# Patient Record
Sex: FEMALE | Race: WHITE | HISPANIC OR LATINO | Employment: UNEMPLOYED | URBAN - METROPOLITAN AREA
[De-identification: names, ages, dates, MRNs, and addresses within clinical notes are randomized per-mention and may not be internally consistent; named-entity substitution may affect disease eponyms.]

---

## 2017-01-09 ENCOUNTER — NON-PROVIDER VISIT (OUTPATIENT)
Dept: OBGYN | Facility: CLINIC | Age: 22
End: 2017-01-09
Payer: MEDICAID

## 2017-01-09 DIAGNOSIS — Z32.01 PREGNANCY EXAMINATION OR TEST, POSITIVE RESULT: ICD-10-CM

## 2017-01-09 LAB
INT CON NEG: NEGATIVE
INT CON POS: POSITIVE
POC URINE PREGNANCY TEST: POSITIVE

## 2017-01-09 PROCEDURE — 81025 URINE PREGNANCY TEST: CPT | Performed by: OBSTETRICS & GYNECOLOGY

## 2017-01-13 ENCOUNTER — APPOINTMENT (OUTPATIENT)
Dept: RADIOLOGY | Facility: MEDICAL CENTER | Age: 22
End: 2017-01-13
Attending: EMERGENCY MEDICINE
Payer: MEDICAID

## 2017-01-13 ENCOUNTER — HOSPITAL ENCOUNTER (EMERGENCY)
Facility: MEDICAL CENTER | Age: 22
End: 2017-01-13
Attending: EMERGENCY MEDICINE
Payer: MEDICAID

## 2017-01-13 VITALS
HEART RATE: 57 BPM | TEMPERATURE: 97.5 F | BODY MASS INDEX: 27.1 KG/M2 | SYSTOLIC BLOOD PRESSURE: 115 MMHG | RESPIRATION RATE: 14 BRPM | HEIGHT: 66 IN | OXYGEN SATURATION: 97 % | WEIGHT: 168.65 LBS | DIASTOLIC BLOOD PRESSURE: 54 MMHG

## 2017-01-13 DIAGNOSIS — O00.109 TUBAL PREGNANCY WITHOUT INTRAUTERINE PREGNANCY: ICD-10-CM

## 2017-01-13 LAB
ALBUMIN SERPL BCP-MCNC: 4.2 G/DL (ref 3.2–4.9)
ALBUMIN/GLOB SERPL: 1.6 G/DL
ALP SERPL-CCNC: 55 U/L (ref 30–99)
ALT SERPL-CCNC: 11 U/L (ref 2–50)
ANION GAP SERPL CALC-SCNC: 10 MMOL/L (ref 0–11.9)
AST SERPL-CCNC: 11 U/L (ref 12–45)
B-HCG SERPL-ACNC: 924.3 MIU/ML (ref 0–5)
BASOPHILS # BLD AUTO: 0.4 % (ref 0–1.8)
BASOPHILS # BLD: 0.04 K/UL (ref 0–0.12)
BILIRUB SERPL-MCNC: 0.4 MG/DL (ref 0.1–1.5)
BUN SERPL-MCNC: 12 MG/DL (ref 8–22)
CALCIUM SERPL-MCNC: 9 MG/DL (ref 8.5–10.5)
CHLORIDE SERPL-SCNC: 108 MMOL/L (ref 96–112)
CO2 SERPL-SCNC: 20 MMOL/L (ref 20–33)
CREAT SERPL-MCNC: 0.54 MG/DL (ref 0.5–1.4)
EOSINOPHIL # BLD AUTO: 0.1 K/UL (ref 0–0.51)
EOSINOPHIL NFR BLD: 1.1 % (ref 0–6.9)
ERYTHROCYTE [DISTWIDTH] IN BLOOD BY AUTOMATED COUNT: 40.8 FL (ref 35.9–50)
GFR SERPL CREATININE-BSD FRML MDRD: >60 ML/MIN/1.73 M 2
GLOBULIN SER CALC-MCNC: 2.6 G/DL (ref 1.9–3.5)
GLUCOSE SERPL-MCNC: 95 MG/DL (ref 65–99)
HCT VFR BLD AUTO: 37.3 % (ref 37–47)
HGB BLD-MCNC: 12.4 G/DL (ref 12–16)
IMM GRANULOCYTES # BLD AUTO: 0.04 K/UL (ref 0–0.11)
IMM GRANULOCYTES NFR BLD AUTO: 0.4 % (ref 0–0.9)
LYMPHOCYTES # BLD AUTO: 1.85 K/UL (ref 1–4.8)
LYMPHOCYTES NFR BLD: 20.6 % (ref 22–41)
MCH RBC QN AUTO: 28.3 PG (ref 27–33)
MCHC RBC AUTO-ENTMCNC: 33.2 G/DL (ref 33.6–35)
MCV RBC AUTO: 85.2 FL (ref 81.4–97.8)
MONOCYTES # BLD AUTO: 0.86 K/UL (ref 0–0.85)
MONOCYTES NFR BLD AUTO: 9.6 % (ref 0–13.4)
NEUTROPHILS # BLD AUTO: 6.1 K/UL (ref 2–7.15)
NEUTROPHILS NFR BLD: 67.9 % (ref 44–72)
NRBC # BLD AUTO: 0 K/UL
NRBC BLD AUTO-RTO: 0 /100 WBC
NUMBER OF RH DOSES IND 8505RD: NORMAL
PLATELET # BLD AUTO: 335 K/UL (ref 164–446)
PMV BLD AUTO: 9 FL (ref 9–12.9)
POTASSIUM SERPL-SCNC: 3.9 MMOL/L (ref 3.6–5.5)
PROT SERPL-MCNC: 6.8 G/DL (ref 6–8.2)
RBC # BLD AUTO: 4.38 M/UL (ref 4.2–5.4)
RH BLD: NORMAL
SODIUM SERPL-SCNC: 138 MMOL/L (ref 135–145)
WBC # BLD AUTO: 9 K/UL (ref 4.8–10.8)

## 2017-01-13 PROCEDURE — 86901 BLOOD TYPING SEROLOGIC RH(D): CPT

## 2017-01-13 PROCEDURE — 96372 THER/PROPH/DIAG INJ SC/IM: CPT

## 2017-01-13 PROCEDURE — 84702 CHORIONIC GONADOTROPIN TEST: CPT

## 2017-01-13 PROCEDURE — 80053 COMPREHEN METABOLIC PANEL: CPT

## 2017-01-13 PROCEDURE — 76817 TRANSVAGINAL US OBSTETRIC: CPT

## 2017-01-13 PROCEDURE — 85025 COMPLETE CBC W/AUTO DIFF WBC: CPT

## 2017-01-13 PROCEDURE — 700111 HCHG RX REV CODE 636 W/ 250 OVERRIDE (IP): Performed by: OBSTETRICS & GYNECOLOGY

## 2017-01-13 PROCEDURE — 99285 EMERGENCY DEPT VISIT HI MDM: CPT

## 2017-01-13 RX ADMIN — METHOTREXATE 47.5 MG: 25 INJECTION, SOLUTION INTRA-ARTERIAL; INTRAMUSCULAR; INTRATHECAL; INTRAVENOUS at 12:03

## 2017-01-13 ASSESSMENT — PAIN SCALES - GENERAL
PAINLEVEL_OUTOF10: 1
PAINLEVEL_OUTOF10: 2

## 2017-01-13 NOTE — ED AVS SNAPSHOT
1/13/2017          [facility]ulo1 [facility] [address]    Dear Karlee:    [facility] wants to ensure your discharge home is safe and you or your loved ones have had all your questions answered regarding your care after you leave the hospital.    You may receive a telephone call within two days of your discharge.  This call is to make certain you understand your discharge instructions as well as ensure we provided you with the best care possible during your stay with us.     The call will only last approximately 3-5 minutes and will be done by a nurse.    Once again, we want to ensure your discharge home is safe and that you have a clear understanding of any next steps in your care.  If you have any questions or concerns, please do not hesitate to contact us, we are here for you.  Thank you for choosing [facility] for your healthcare needs.    Sincerely,    Bipin Paiz    [facility]

## 2017-01-13 NOTE — ED NOTES
Chief Complaint   Patient presents with   • Vaginal Bleeding      patient estimates blood loss at roughly a pad every three hours.   • Abdominal Pain     LLQ.    • Pregnancy     patient is unsure about exact stage of pregnancy but thinks around 4 weeks   Pt ambulatory to triage with above complaint. Pt returned to jet, educated on triage process, and to inform staff of any changes or concerns.   Patient was playing with small children when she felt the pain come suddenly

## 2017-01-13 NOTE — CONSULTS
"Chief Complaint   Patient presents with   • Vaginal Bleeding      patient estimates blood loss at roughly a pad every three hours.   • Abdominal Pain     LLQ.    • Pregnancy     patient is unsure about exact stage of pregnancy but thinks around 4 weeks     REQUEST FOR CONSULT REGARDING PROBABLE ECTOPIC PREGNANCY    History of present illness:   21 y.o.  early pregnancy presents to the ER today because of vaginal bleeding and lower abdominal pain more on the left  (+) pregnancy test early this week done at [facility]. She was calculated to be about 4+ weeks  Pt went to [facility] ER yesterday secondary above complaints - BHCG 1100+. TVS done. Labs reviewed  She came today because of ongoing vaginal bleeding and abdominal pain. Repeat BHCG - 900  H/H remained stable  TVS reviewed, 2 x 1 cm adnexal mass highly suspicious for ectopic pregnancy  Pt has no other gyn symptoms  No SOB, no dizziness  Review of systems:  Pertinent positives documented in HPI and all other systems reviewed & are negative    All PMH, PSH, allergies, social history and FH reviewed and updated today:  History reviewed. No pertinent past medical history.    History reviewed. No pertinent past surgical history.    Allergies: No Known Allergies    Social History     Social History   • Marital Status: Single     Spouse Name: N/A   • Number of Children: N/A   • Years of Education: N/A     Occupational History   • Not on file.     Social History Main Topics   • Smoking status: Never Smoker    • Smokeless tobacco: Not on file   • Alcohol Use: No   • Drug Use: No   • Sexual Activity: Not on file     Other Topics Concern   • Not on file     Social History Narrative   • No narrative on file       Family History   Problem Relation Age of Onset   • Diabetes Maternal Grandmother      Physical exam:  Blood pressure 115/54, pulse 77, temperature 36.4 °C (97.5 °F), resp. rate 18, height 1.676 m (5' 6\"), weight 76.5 kg (168 lb 10.4 oz), last menstrual period " 12/10/2016, SpO2 95 %.    General:appears stated age, is in no apparent distress, is well developed and well nourished  Head: normocephalic, non-tender  Neck: neck is supple  CV : regular rate and rhythm, no peripheral edema  Lungs: Normal respiratory effort. Clear to auscultation bilaterally  Abdomen: Bowel sounds positive, nondistended, soft, tender to deep palpation left lower pelvis, no rebound or guarding.   No organomegaly. No masses.  Female GYN: (+) minimal to moderate blood in the vault  Cervix - closed  Uterus - not enlarged, (+) tender bilateral adnexal areas  Skin: No rashes, or ulcers or lesions seen  Psychiatric: Patient shows appropriate affect, is alert and oriented x3, intact judgment and insight.    ASSESSMENT AND PLAN:  1. 21 year old , Left Ectopic pregnancy  2. Labs and imaging discussed with her. TVS reviewed, 2 x 1 cm adnexal mass highly suspicious for ectopic pregnancy; (+) FF in the cul-de-sac  3. Patient is hemodynamically stable and a candidate for MTX  4. MTX vs surgical procedure via laparoscopy discussed. R/I/B   5. Pt agreed to MTX tx. Success and failure, side effects, risks of the medication explained. Instructions for ff-up. Repeat BHCG Day 4 and Day 7 of MTX ordered  6. All questions addressed to stated satisfaction  7. Pt to ff-up at [facility] 1 week

## 2017-01-13 NOTE — ED NOTES
Discharging patient home. Verbalized understanding of discharge instructions, follow up appointments, and home care. All questions answered.  Belongings with patient at time of discharge.  Vitals signs WNL. Pt given discharge information. Discharge assessment completed.

## 2017-01-13 NOTE — DISCHARGE INSTRUCTIONS
Methotrexate Treatment for an Ectopic Pregnancy  Methotrexate is a medicine that treats ectopic pregnancy by stopping the growth of the fertilized egg. It also helps your body absorb tissue from the egg. This takes between 2 weeks and 6 weeks. Most ectopic pregnancies can be successfully treated with methotrexate if they are detected early enough.  LET YOUR HEALTH CARE PROVIDER KNOW ABOUT:  · Any allergies you have.  · All medicines you are taking, including vitamins, herbs, eye drops, creams, and over-the-counter medicines.  · Medical conditions you have.  RISKS AND COMPLICATIONS  Generally, this is a safe treatment. However, as with any treatment, problems can occur. Possible problems or side effects include:  · Nausea.  · Vomiting.  · Diarrhea.  · Abdominal cramping.  · Mouth sores.  · Increased vaginal bleeding or spotting.    · Swelling or irritation of the lining of your lungs (pneumonitis).   · Failed treatment and continuation of the pregnancy.    · Liver damage.  · Hair loss.  There is still a risk of the ectopic pregnancy rupturing while using the methotrexate.  BEFORE THE PROCEDURE  Before you take the medicine:   · Liver tests, kidney tests, and a complete blood test are performed.  · Blood tests are performed to measure the pregnancy hormone levels and to determine your blood type.  · If you are Rh-negative and the father is Rh-positive or his Rh type is not known, you will be given a Rho (D) immune globulin shot.  PROCEDURE   There are two methods that your health care provider may use to prescribe methotrexate. One method involves a single dose or injection of the medicine. Another method involves a series of doses given through several injections.   AFTER THE PROCEDURE  · You may have some abdominal cramping, vaginal bleeding, and fatigue in the first few days after taking methotrexate.  · Blood tests will be taken for several weeks to check the pregnancy hormone levels. The blood tests are performed  until there is no more pregnancy hormone detected in the blood.     This information is not intended to replace advice given to you by your health care provider. Make sure you discuss any questions you have with your health care provider.     Document Released: 12/12/2002 Document Revised: 01/08/2016 Document Reviewed: 10/06/2014  Elsevier Interactive Patient Education ©2016 Elsevier Inc.

## 2017-01-13 NOTE — PROGRESS NOTES
"Pharmacy Methotrexate for Ectopic Pregnancy Calculation Note       Wt 76.5 kg Ht 66 in  BSA ([place]eller) 1.89 m2    Pertinent Labs:  SCr 0.54  T. Bili 0.4, AST 11, ALT 11    Dx: Ectopic Pregnancy  Usual dose: Methotrexate 50 mg/m2 IM x 1 dose   Reference:   Lynette PAK, \"Clinical Practice. Ectopic Pregnancy,\" N Engl J Med, 2009, 361(4):379-87.      Calculations:  Methotrexate 50 mg/m2 x 1.89 m2 = 94.5 mg  Final Dose = 95 mg IM   Conc = 25 mg/mL  Split into two syringes 1.9 mL each for total volume = 3.8 mL     Harjeet Ge, PharmD, BCPS        "

## 2017-01-13 NOTE — ED AVS SNAPSHOT
After Visit Summary                                                                                                                Karlee Munoz   MRN: [id number]    Department:  [facility], Emergency Dept   Date of Visit:  1/13/2017            [facility], Emergency Dept    [address]    [place] [address]    Phone:  [phone]      You were seen by     Cy Hopkins M.D.      Your Diagnosis Was     Tubal pregnancy without intrauterine pregnancy     O00.10       These are the medications you received during your hospitalization from 01/13/2017 0421 to 01/13/2017 1314     Date/Time Order Dose Route Action    01/13/2017 1203 methotrexate PF 47.5 mg in syringe 1.9 mL Chemotherapy 47.5 mg Intramuscular Given    01/13/2017 1203 methotrexate PF 47.5 mg in syringe 1.9 mL Chemotherapy 47.5 mg Intramuscular Given      Follow-up Information     1. Follow up with Maricel Vicente M.D. In 2 days.    Specialty:  OB/Gyn    Why:  for recheck, return for feeling lightheaded and dizzy heavy bleeding worsening abdominal pain    Contact information    [address]  [facility] [address] [phone]        Medication Information     Review all of your home medications and newly ordered medications with your primary doctor and/or pharmacist as soon as possible. Follow medication instructions as directed by your doctor and/or pharmacist.     Please keep your complete medication list with you and share with your physician. Update the information when medications are discontinued, doses are changed, or new medications (including over-the-counter products) are added; and carry medication information at all times in the event of emergency situations.               Medication List      Notice     You have not been prescribed any medications.            Procedures and tests performed during your visit     BETA-HCG QUANTITATIVE SERUM    CARDIAC MONITORING    CBC WITH  DIFFERENTIAL    COMP METABOLIC PANEL    ESTIMATED GFR    O2 Protocol    RH TYPE FOR RHOGAM FROM E.D.    SALINE LOCK    US-OB PELVIS TRANSVAGINAL        Discharge Instructions       Methotrexate Treatment for an Ectopic Pregnancy  Methotrexate is a medicine that treats ectopic pregnancy by stopping the growth of the fertilized egg. It also helps your body absorb tissue from the egg. This takes between 2 weeks and 6 weeks. Most ectopic pregnancies can be successfully treated with methotrexate if they are detected early enough.  LET YOUR HEALTH CARE PROVIDER KNOW ABOUT:  · Any allergies you have.  · All medicines you are taking, including vitamins, herbs, eye drops, creams, and over-the-counter medicines.  · Medical conditions you have.  RISKS AND COMPLICATIONS  Generally, this is a safe treatment. However, as with any treatment, problems can occur. Possible problems or side effects include:  · Nausea.  · Vomiting.  · Diarrhea.  · Abdominal cramping.  · Mouth sores.  · Increased vaginal bleeding or spotting.    · Swelling or irritation of the lining of your lungs (pneumonitis).   · Failed treatment and continuation of the pregnancy.    · Liver damage.  · Hair loss.  There is still a risk of the ectopic pregnancy rupturing while using the methotrexate.  BEFORE THE PROCEDURE  Before you take the medicine:   · Liver tests, kidney tests, and a complete blood test are performed.  · Blood tests are performed to measure the pregnancy hormone levels and to determine your blood type.  · If you are Rh-negative and the father is Rh-positive or his Rh type is not known, you will be given a Rho (D) immune globulin shot.  PROCEDURE   There are two methods that your health care provider may use to prescribe methotrexate. One method involves a single dose or injection of the medicine. Another method involves a series of doses given through several injections.   AFTER THE PROCEDURE  · You may have some abdominal cramping, vaginal  bleeding, and fatigue in the first few days after taking methotrexate.  · Blood tests will be taken for several weeks to check the pregnancy hormone levels. The blood tests are performed until there is no more pregnancy hormone detected in the blood.     This information is not intended to replace advice given to you by your health care provider. Make sure you discuss any questions you have with your health care provider.     Document Released: 12/12/2002 Document Revised: 01/08/2016 Document Reviewed: 10/06/2014  Elsevier Interactive Patient Education ©2016 [employer]            Patient Information     Patient Information    Following emergency treatment: all patient requiring follow-up care must return either to a private physician or a clinic if your condition worsens before you are able to obtain further medical attention, please return to the emergency room.     Billing Information    At [facility], we work to make the billing process streamlined for our patients.  Our Representatives are here to answer any questions you may have regarding your hospital bill.  If you have insurance coverage and have supplied your insurance information to us, we will submit a claim to your insurer on your behalf.  Should you have any questions regarding your bill, we can be reached online or by phone as follows:  Online: You are able pay your bills online or live chat with our representatives about any billing questions you may have. We are here to help Monday - Friday from 8:00am to 7:30pm and 9:00am - 12:00pm on Saturdays.  Please visit [email or web address]  for more information.   Phone:  [phone] or [phone]    Please note that your emergency physician, surgeon, pathologist, radiologist, anesthesiologist, and other specialists are not employed by [facility] and will therefore bill separately for their services.  Please contact them directly for any questions concerning their bills  at the numbers below:     Emergency Physician Services:  [phone]  [facility]:  [phone]  [facility]:  [phone]  [facility]:  [phone]    1. Your final bill may vary from the amount quoted upon discharge if all procedures are not complete at that time, or if your doctor has additional procedures of which we are not aware. You will receive an additional bill if you return to the Emergency Department at [facility] for suture removal regardless of the facility of which the sutures were placed.     2. Please arrange for settlement of this account at the emergency registration.    3. All self-pay accounts are due in full at the time of treatment.  If you are unable to meet this obligation then payment is expected within 4-5 days.     4. If you have had radiology studies (CT, X-ray, Ultrasound, MRI), you have received a preliminary result during your emergency department visit. Please contact the radiology department [phone] to receive a copy of your final result. Please discuss the Final result with your primary physician or with the follow up physician provided.     Crisis Hotline:  [place] Crisis Hotline:  5-490-BKKZYWS or 1-137.753.9405  [place] Crisis Hotline:    1-710.973.9712 or 728-425-3659         ED Discharge Follow Up Questions    1. In order to provide you with very good care, we would like to follow up with a phone call in the next few days.  May we have your permission to contact you?     YES /  NO    2. What is the best phone number to call you? (       )_____-__________    3. What is the best time to call you?      Morning  /  Afternoon  /  Evening                   Patient Signature:  ____________________________________________________________    Date:  ____________________________________________________________

## 2017-01-13 NOTE — ED NOTES
Pt complaints of low abdominal cramping and bleeding. States she was seen at pregnancy center Monday and told she was 4 w 2 days pregnant.

## 2017-01-13 NOTE — ED AVS SNAPSHOT
Flomio Access Code: [email or web address]  Expires: 2/8/2017 12:40 PM    Your email address is not on file at [facility].  Email Addresses are required for you to sign up for Flomio, please contact [phone] to verify your personal information and to provide your email address prior to attempting to register for Flomio.    Karlee Angulo1 [address] [facility] [place] [address]    Flomio  A secure, online tool to manage your health information     [employer]’s Flomio® is a secure, online tool that connects you to your personalized health information from the privacy of your home -- day or night - making it very easy for you to manage your healthcare. Once the activation process is completed, you can even access your medical information using the Flomio dennis, which is available for free in the Apple Dennis store or Google Play store.     To learn more about Flomio, visit [email or web address] by [email or web address]    There are two levels of access available (as shown below):   My Chart Features  [facility] Primary Care Doctor [facility]  Specialists [facility]  Urgent  Care Non-[facility] Primary Care Doctor   Email your healthcare team securely and privately 24/7 X X X    Manage appointments: schedule your next appointment; view details of past/upcoming appointments X      Request prescription refills. X      View recent personal medical records, including lab and immunizations X X X X   View health record, including health history, allergies, medications X X X X   Read reports about your outpatient visits, procedures, consult and ER notes X X X X   See your discharge summary, which is a recap of your hospital and/or ER visit that includes your diagnosis, lab results, and care plan X X  X     How to register for Flomio:  Once your e-mail address has been verified, follow the following steps to sign up for Flomio.     1. Go to  [email or web address]  2. Click on the Sign Up Now box, which takes you to the New Member Sign Up page. You  will need to provide the following information:  a. Enter your [employer] Access Code exactly as it appears at the top of this page. (You will not need to use this code after you’ve completed the sign-up process. If you do not sign up before the expiration date, you must request a new code.)   b. Enter your date of birth.   c. Enter your home email address.   d. Click Submit, and follow the next screen’s instructions.  3. Create a [employer] ID. This will be your Modulus Video login ID and cannot be changed, so think of one that is secure and easy to remember.  4. Create a [employer] password. You can change your password at any time.  5. Enter your Password Reset Question and Answer. This can be used at a later time if you forget your password.   6. Enter your e-mail address. This allows you to receive e-mail notifications when new information is available in Mod[employer].  7. Click Sign Up. You can now view your health information.    For assistance activating your [employer] account, call [phone]

## 2017-01-13 NOTE — ED PROVIDER NOTES
ED Provider Note    CHIEF COMPLAINT  Chief Complaint   Patient presents with   • Vaginal Bleeding      patient estimates blood loss at roughly a pad every three hours.   • Abdominal Pain     LLQ.    • Pregnancy     patient is unsure about exact stage of pregnancy but thinks around 4 weeks       HPI  Karlee Munoz is a 21 y.o. female who presents for evaluation of lower abdominal pain primarily sharp left-sided mild cramping and vaginal bleeding consistent with a typical menstrual cycle. The patient reports that she is pregnant. Her last special [place] was December 10 which was about 3-5 days ago. She denies heavy bleeding passage of tissue or clots. She reports that she was seen at [facility] and had a positive pregnancy test as well as ultrasound. She has never been pregnant before. No significant medical or surgical history     REVIEW OF SYSTEMS  See HPI for further details. No night sweats weight loss numbness tingling or weakness All other systems are negative.     PAST MEDICAL HISTORY  History reviewed. No pertinent past medical history.  No significant medical history  FAMILY HISTORY  Noncontributory    SOCIAL HISTORY  Social History     Social History   • Marital Status: Single     Spouse Name: N/A   • Number of Children: N/A   • Years of Education: N/A     Social History Main Topics   • Smoking status: Never Smoker    • Smokeless tobacco: None   • Alcohol Use: No   • Drug Use: No   • Sexual Activity: Not Asked     Other Topics Concern   • None     Social History Narrative   • None     No drugs or alcohol  SURGICAL HISTORY  History reviewed. No pertinent past surgical history.    CURRENT MEDICATIONS  Home Medications     Reviewed by Radha Rodrigues R.N. (Registered Nurse) on 01/13/17 at 0543  Med List Status: Complete    Medication Last Dose Status          Patient Sonu Taking any Medications                        ALLERGIES  No Known Allergies    PHYSICAL EXAM  VITAL SIGNS: BP  "115/54 mmHg  Pulse 77  Temp(Src) 36.4 °C (97.5 °F)  Resp 18  Ht 1.676 m (5' 6\")  Wt 76.5 kg (168 lb 10.4 oz)  BMI 27.23 kg/m2  SpO2 95%  LMP 12/10/2016 (Exact Date) Room air O2: 100    Constitutional: Well developed, Well nourished, No acute distress, Non-toxic appearance.   HENT: Normocephalic, Atraumatic, Bilateral external ears normal, Oropharynx moist, No oral exudates, Nose normal.   Eyes: PERRLA, EOMI, Conjunctiva normal, No discharge.   Neck: Normal range of motion, No tenderness, Supple, No stridor.   Cardiovascular: Normal heart rate, Normal rhythm, No murmurs, No rubs, No gallops.   Thorax & Lungs: Normal breath sounds, No respiratory distress, No wheezing, No chest tenderness.   Abdomen: Bowel sounds normal, Soft, mild left lower quadrant tenderness  Skin: Warm, Dry, No erythema, No rash.   Back: No tenderness, No CVA tenderness.   Extremities: Intact distal pulses, No edema, No tenderness, No cyanosis, No clubbing.   Neurologic: Alert & oriented x 3, Normal motor function, Normal sensory function, No focal deficits noted.   Psychiatric: Affect normal, Judgment normal, Mood normal.     RADIOLOGY/PROCEDURES  US-OB PELVIS TRANSVAGINAL   Final Result      1.  Irregular teardrop shaped structure in the image canal is nonspecific, but has the appearance of a pseudogestational sac.      2.  Left adnexal mass remains suspicious for ectopic pregnancy even though appearance is atypical      3.  Small amount of free pelvic fluid.         Comment: Results were discussed with Dr. Hopkins at 8:35 AM            COURSE & MEDICAL DECISION MAKING  Pertinent Labs & Imaging studies reviewed. (See chart for details)  Results for orders placed or performed during the hospital encounter of 01/13/17   CBC WITH DIFFERENTIAL   Result Value Ref Range    WBC 9.0 4.8 - 10.8 K/uL    RBC 4.38 4.20 - 5.40 M/uL    Hemoglobin 12.4 12.0 - 16.0 g/dL    Hematocrit 37.3 37.0 - 47.0 %    MCV 85.2 81.4 - 97.8 fL    MCH 28.3 27.0 - 33.0 " pg    MCHC 33.2 (L) 33.6 - 35.0 g/dL    RDW 40.8 35.9 - 50.0 fL    Platelet Count 335 164 - 446 K/uL    MPV 9.0 9.0 - 12.9 fL    Neutrophils-Polys 67.90 44.00 - 72.00 %    Lymphocytes 20.60 (L) 22.00 - 41.00 %    Monocytes 9.60 0.00 - 13.40 %    Eosinophils 1.10 0.00 - 6.90 %    Basophils 0.40 0.00 - 1.80 %    Immature Granulocytes 0.40 0.00 - 0.90 %    Nucleated RBC 0.00 /100 WBC    Neutrophils (Absolute) 6.10 2.00 - 7.15 K/uL    Lymphs (Absolute) 1.85 1.00 - 4.80 K/uL    Monos (Absolute) 0.86 (H) 0.00 - 0.85 K/uL    Eos (Absolute) 0.10 0.00 - 0.51 K/uL    Baso (Absolute) 0.04 0.00 - 0.12 K/uL    Immature Granulocytes (abs) 0.04 0.00 - 0.11 K/uL    NRBC (Absolute) 0.00 K/uL   COMP METABOLIC PANEL   Result Value Ref Range    Sodium 138 135 - 145 mmol/L    Potassium 3.9 3.6 - 5.5 mmol/L    Chloride 108 96 - 112 mmol/L    Co2 20 20 - 33 mmol/L    Anion Gap 10.0 0.0 - 11.9    Glucose 95 65 - 99 mg/dL    Bun 12 8 - 22 mg/dL    Creatinine 0.54 0.50 - 1.40 mg/dL    Calcium 9.0 8.5 - 10.5 mg/dL    AST(SGOT) 11 (L) 12 - 45 U/L    ALT(SGPT) 11 2 - 50 U/L    Alkaline Phosphatase 55 30 - 99 U/L    Total Bilirubin 0.4 0.1 - 1.5 mg/dL    Albumin 4.2 3.2 - 4.9 g/dL    Total Protein 6.8 6.0 - 8.2 g/dL    Globulin 2.6 1.9 - 3.5 g/dL    A-G Ratio 1.6 g/dL   BETA-HCG QUANTITATIVE SERUM   Result Value Ref Range    Bhcg 924.3 (H) 0.0 - 5.0 mIU/mL   ESTIMATED GFR   Result Value Ref Range    GFR If African American >60 >60 mL/min/1.73 m 2    GFR If Non African American >60 >60 mL/min/1.73 m 2   RH TYPE FOR RHOGAM FROM E.D.   Result Value Ref Range    Emergency Department Rh Typing POS     Number Of Rh Doses Indicated ZERO       We receive the records from [facility]. There she had an indeterminate ultrasound and a quantitative hCG of approximately 1150. Emergent consultation with gynecology was obtained with Dr. Scott. She feels and I agree that this is high likelihood that this is not ectopic given the ultrasound  appearance her presentation small amount of free fluid in the pelvis and [place]. She was aan ideal candidate for methotrexate given the fact that there is no heart tone no evidence of active bleeding and low quantitative hCG. She was given methotrexate intramuscularly here and will be followed up with Dr. Scott   FINAL IMPRESSION  1. Left lower quadrant nonruptured ectopic pregnancy without hemorrhagic shock         Electronically signed by: Cy Hopkins, 1/13/2017 5:53 AM

## 2017-01-16 DIAGNOSIS — O03.9 SAB (SPONTANEOUS ABORTION): ICD-10-CM

## 2017-01-18 ENCOUNTER — GYNECOLOGY VISIT (OUTPATIENT)
Dept: OBGYN | Facility: CLINIC | Age: 22
End: 2017-01-18
Payer: MEDICAID

## 2017-01-18 VITALS
HEART RATE: 66 BPM | DIASTOLIC BLOOD PRESSURE: 58 MMHG | SYSTOLIC BLOOD PRESSURE: 112 MMHG | WEIGHT: 168 LBS | HEIGHT: 67 IN | BODY MASS INDEX: 26.37 KG/M2

## 2017-01-18 DIAGNOSIS — O00.109 TUBAL PREGNANCY WITHOUT INTRAUTERINE PREGNANCY: ICD-10-CM

## 2017-01-18 PROCEDURE — 99203 OFFICE O/P NEW LOW 30 MIN: CPT | Performed by: OBSTETRICS & GYNECOLOGY

## 2017-01-18 NOTE — Clinical Note
January 18, 2017       Patient: Karlee Munoz   YOB: 1995   Date of Visit: 1/18/2017         To Whom It May Concern:    Please excuse Karlee Munoz from work 1/16/2017-1/20/2017.    If you have any questions or concerns, please don't hesitate to call [phone]          Sincerely,          Maricel Vicente M.D.  Electronically Signed

## 2017-01-18 NOTE — NON-PROVIDER
GYN visit for ER follow up  LMP: 12/10/2016  WT: 168 lb  BP: 112/58  Pt states having some spotting, states having lower abdominal pain with cramping.  Preferred pharmacy verified.  Good # [phone]

## 2017-01-18 NOTE — PROGRESS NOTES
"CC= ER ff-up for ectopic pregnancy, s/p MTX (day 6)    History of present illness:   21 y.o.  with diagnosed ectopic pregnancy presents for ff-up  Pt is doing better, no abdominal pain  (+) vaginal spotting    Review of systems:  Pertinent positives documented in HPI and all other systems reviewed & are negative    All PMH, PSH, allergies, social history and FH reviewed and updated today:  Past Medical History   Diagnosis Date   • Anemia        Past Surgical History   Procedure Laterality Date   • Eye surgery  2016     bilateral       Allergies: No Known Allergies    Social History     Social History   • Marital Status: Single     Spouse Name: N/A   • Number of Children: N/A   • Years of Education: N/A     Occupational History   • Not on file.     Social History Main Topics   • Smoking status: Never Smoker    • Smokeless tobacco: Not on file   • Alcohol Use: No   • Drug Use: No   • Sexual Activity:     Partners: Male     Other Topics Concern   • Not on file     Social History Narrative       Family History   Problem Relation Age of Onset   • Diabetes Maternal Grandmother      Physical exam:  Blood pressure 112/58, pulse 66, height 1.695 m (5' 6.73\"), weight 76.204 kg (168 lb), last menstrual period 12/10/2016.    General:appears stated age, is in no apparent distress, is well developed and well nourished  Skin: No rashes, or ulcers or lesions seen  Psychiatric: Patient shows appropriate affect, is alert and oriented x3, intact judgment and insight.    1. Tubal pregnancy without intrauterine pregnancy- s/p MTX (2017)     2. Stable  3. Ectopic precautions  4. Pelvic rest  5. BHCG tomorrow Day 7  6. All questions addressed    "

## 2017-01-18 NOTE — MR AVS SNAPSHOT
"        Karlee Munoz   2017 2:00 PM   Gynecology Visit   MRN: [id number]    [facility]   Dept Phone:  [phone]    Description:  Female : 1995   Provider:  Maricel Vicente M.D.           Allergies as of 2017     No Known Allergies      You were diagnosed with     Tubal pregnancy without intrauterine pregnancy   [633.10.ICD-9-CM]         Vital Signs     Blood Pressure Pulse Height Weight Body Mass Index Last Menstrual Period    112/58 mmHg 66 1.695 m (5' 6.73\") 76.204 kg (168 lb) 26.52 kg/m2 12/10/2016 (Exact Date)    Smoking Status                   Never Smoker            Basic Information     Date Of Birth Sex Race Ethnicity Preferred Language    1995 Female White  Origin (Jamaican,Marshallese,Croatian,Pascual, etc) English      Problem List              ICD-10-CM Priority Class Noted - Resolved    Tubal pregnancy without intrauterine pregnancy- s/p MTX (2017) O00.10   2017 - Present      Health Maintenance        Date Due Completion Dates    IMM HEP B VACCINE (1 of 3 - Primary Series) 1995 ---    IMM HEP A VACCINE (1 of 2 - Standard Series) 4/3/1998 ---    IMM HPV VACCINE (1 of 3 - Female 3 Dose Series) 4/3/2008 ---    IMM VARICELLA (CHICKENPOX) VACCINE (1 of 2 - 2 Dose Adolescent Series) 4/3/2010 ---    IMM MENINGOCOCCAL VACCINE (MCV4) (1 of 1) 4/3/2013 ---    IMM DTaP/Tdap/Td Vaccine (1 - Tdap) 4/3/2016 ---    IMM INFLUENZA (1) 2016 ---            Current Immunizations     No immunizations on file.      Below and/or attached are the medications your provider expects you to take. Review all of your home medications and newly ordered medications with your provider and/or pharmacist. Follow medication instructions as directed by your provider and/or pharmacist. Please keep your medication list with you and share with your provider. Update the information when medications are discontinued, doses are changed, or new medications " (including over-the-counter products) are added; and carry medication information at all times in the event of emergency situations     Allergies:  No Known Allergies          Medications  Valid as of: January 18, 2017 -  4:59 PM    Generic Name Brand Name Tablet Size Instructions for use    .                 Medicines prescribed today were sent to:     [facility] [address] [place] [address], [place] - [address] [id number] [facility] [place] [address]    Phone: [phone] Fax: [phone]    Open 24 Hours?: No      Medication refill instructions:       If your prescription bottle indicates you have medication refills left, it is not necessary to call your provider’s office. Please contact your pharmacy and they will refill your medication.    If your prescription bottle indicates you do not have any refills left, you may request refills at any time through one of the following ways: The online EasyPost system (except Urgent Care), by calling your provider’s office, or by asking your pharmacy to contact your provider’s office with a refill request. Medication refills are processed only during regular business hours and may not be available until the next business day. Your provider may request additional information or to have a follow-up visit with you prior to refilling your medication.   *Please Note: Medication refills are assigned a new Rx number when refilled electronically. Your pharmacy may indicate that no refills were authorized even though a new prescription for the same medication is available at the pharmacy. Please request the medicine by name with the pharmacy before contacting your provider for a refill.           EasyPost Access Code: [email or web address]  Expires: 2/8/2017 12:40 PM    EasyPost  A secure, online tool to manage your health information     [employer]’s EasyPost® is a secure, online tool that connects you to your personalized health information from the privacy of your home -- day or  night - making it very easy for you to manage your healthcare. Once the activation process is completed, you can even access your medical information using the Mass Relevance dennis, which is available for free in the Apple Dennis store or Google Play store.     Mass Relevance provides the following levels of access (as shown below):   My Chart Features   [facility] Primary [facility] Doctor [facility]  Specialists [facility]  Urgent  Care Non-[facility]  Primary Care  Doctor   Email your healthcare team securely and privately 24/7 X X X    Manage appointments: schedule your next appointment; view details of past/upcoming appointments X      Request prescription refills. X      View recent personal medical records, including lab and immunizations X X X X   View health record, including health history, allergies, medications X X X X   Read reports about your outpatient visits, procedures, consult and ER notes X X X X   See your discharge summary, which is a recap of your hospital and/or ER visit that includes your diagnosis, lab results, and care plan. X X       How to register for Mass Relevance:  1. Go to  [email or web address].  2. Click on the Sign Up Now box, which takes you to the New Member Sign Up page. You will need to provide the following information:  a. Enter your Mass Relevance Access Code exactly as it appears at the top of this page. (You will not need to use this code after you’ve completed the sign-up process. If you do not sign up before the expiration date, you must request a new code.)   b. Enter your date of birth.   c. Enter your home email address.   d. Click Submit, and follow the next screen’s instructions.  3. Create a Mass Relevance ID. This will be your Mass Relevance login ID and cannot be changed, so think of one that is secure and easy to remember.  4. Create a Mass Relevance password. You can change your password at any time.  5. Enter your Password Reset Question and Answer. This can be used at a later time if you forget your password.   6. Enter your  e-mail address. This allows you to receive e-mail notifications when new information is available in T-ZONE.  7. Click Sign Up. You can now view your health information.    For assistance activating your T-ZONE account, call [phone]

## 2017-01-23 ENCOUNTER — HOSPITAL ENCOUNTER (EMERGENCY)
Facility: MEDICAL CENTER | Age: 22
End: 2017-01-23
Attending: EMERGENCY MEDICINE
Payer: MEDICAID

## 2017-01-23 ENCOUNTER — TELEPHONE (OUTPATIENT)
Dept: OBGYN | Facility: CLINIC | Age: 22
End: 2017-01-23

## 2017-01-23 ENCOUNTER — GYNECOLOGY VISIT (OUTPATIENT)
Dept: OBGYN | Facility: MEDICAL CENTER | Age: 22
End: 2017-01-23
Payer: MEDICAID

## 2017-01-23 VITALS
RESPIRATION RATE: 16 BRPM | BODY MASS INDEX: 26.79 KG/M2 | HEART RATE: 85 BPM | TEMPERATURE: 98.7 F | HEIGHT: 66 IN | SYSTOLIC BLOOD PRESSURE: 101 MMHG | WEIGHT: 166.67 LBS | DIASTOLIC BLOOD PRESSURE: 61 MMHG | OXYGEN SATURATION: 97 %

## 2017-01-23 VITALS — DIASTOLIC BLOOD PRESSURE: 60 MMHG | SYSTOLIC BLOOD PRESSURE: 110 MMHG

## 2017-01-23 DIAGNOSIS — O00.90 ECTOPIC PREGNANCY, UNSPECIFIED LOCATION, UNSPECIFIED WHETHER INTRAUTERINE PREGNANCY PRESENT: ICD-10-CM

## 2017-01-23 DIAGNOSIS — O00.109 TUBAL PREGNANCY WITHOUT INTRAUTERINE PREGNANCY: ICD-10-CM

## 2017-01-23 DIAGNOSIS — Z79.631 ON METHOTREXATE THERAPY: ICD-10-CM

## 2017-01-23 PROCEDURE — 96372 THER/PROPH/DIAG INJ SC/IM: CPT

## 2017-01-23 PROCEDURE — 99283 EMERGENCY DEPT VISIT LOW MDM: CPT

## 2017-01-23 PROCEDURE — 700111 HCHG RX REV CODE 636 W/ 250 OVERRIDE (IP): Performed by: OBSTETRICS & GYNECOLOGY

## 2017-01-23 RX ADMIN — METHOTREXATE 94 MG: 25 INJECTION, SOLUTION INTRA-ARTERIAL; INTRAMUSCULAR; INTRATHECAL; INTRAVENOUS at 14:33

## 2017-01-23 NOTE — MR AVS SNAPSHOT
Karlee Munoz   2017 12:45 PM   Gynecology Visit   MRN: [id number]    Department:  [facility]   Dept Phone:  [phone]    Description:  Female : 1995   Provider:  Maricel Vicente M.D.           Reason for Visit     Other DUB/Ectopic       Allergies as of 2017     No Known Allergies      You were diagnosed with     Tubal pregnancy without intrauterine pregnancy   [633.10.ICD-9-CM]         Vital Signs     Last Menstrual Period Smoking Status                12/10/2016 (Exact Date) Never Smoker           Basic Information     Date Of Birth Sex Race Ethnicity Preferred Language    1995 Female White  Origin (Citizen of [place],Grenadian,Guinean,Liberian, etc) English      Problem List              ICD-10-CM Priority Class Noted - Resolved    Tubal pregnancy without intrauterine pregnancy- s/p MTX (2017) O00.10   2017 - Present      Health Maintenance        Date Due Completion Dates    IMM HEP B VACCINE (1 of 3 - Primary Series) 1995 ---    IMM HEP A VACCINE (1 of 2 - Standard Series) 4/3/1998 ---    IMM HPV VACCINE (1 of 3 - Female 3 Dose Series) 4/3/2008 ---    IMM VARICELLA (CHICKENPOX) VACCINE (1 of 2 - 2 Dose Adolescent Series) 4/3/2010 ---    IMM MENINGOCOCCAL VACCINE (MCV4) (1 of 1) 4/3/2013 ---    IMM DTaP/Tdap/Td Vaccine (1 - Tdap) 4/3/2016 ---    IMM INFLUENZA (1) 2016 ---            Current Immunizations     No immunizations on file.      Below and/or attached are the medications your provider expects you to take. Review all of your home medications and newly ordered medications with your provider and/or pharmacist. Follow medication instructions as directed by your provider and/or pharmacist. Please keep your medication list with you and share with your provider. Update the information when medications are discontinued, doses are changed, or new medications (including over-the-counter products) are added; and carry medication information at  all times in the event of emergency situations     Allergies:  No Known Allergies          Medications  Valid as of: January 23, 2017 -  2:09 PM    Generic Name Brand Name Tablet Size Instructions for use    .                 Medicines prescribed today were sent to:     [facility] [address] [place] [address] [address] [facility]    [address] [facility] [address]    Phone: [phone] Fax: [phone]    Open 24 Hours?: No      Medication refill instructions:       If your prescription bottle indicates you have medication refills left, it is not necessary to call your provider’s office. Please contact your pharmacy and they will refill your medication.    If your prescription bottle indicates you do not have any refills left, you may request refills at any time through one of the following ways: The online TiGenix system (except Urgent Care), by calling your provider’s office, or by asking your pharmacy to contact your provider’s office with a refill request. Medication refills are processed only during regular business hours and may not be available until the next business day. Your provider may request additional information or to have a follow-up visit with you prior to refilling your medication.   *Please Note: Medication refills are assigned a new Rx number when refilled electronically. Your pharmacy may indicate that no refills were authorized even though a new prescription for the same medication is available at the pharmacy. Please request the medicine by name with the pharmacy before contacting your provider for a refill.        Your To Do List     Future Labs/Procedures Complete By Expires    HCG QUANTITATIVE  As directed 1/23/2018    Scheduling Instructions:    Draw blood Jan 26 2017 - [facility]         TiGenix Access Code: [email or web address]  Expires: 2/8/2017 12:40 PM    TiGenix  A secure, online tool to manage your health information     [employer]’s TiGenix® is a secure, online tool that  connects you to your personalized health information from the privacy of your home -- day or night - making it very easy for you to manage your healthcare. Once the activation process is completed, you can even access your medical information using the Combat Stroke dennis, which is available for free in the Apple Dennis store or Google Play store.     Combat Stroke provides the following levels of access (as shown below):   My Chart Features   [facility] Primary [facility] Doctor [facility]  Specialists [facility]  Urgent  Care Non-[facility]  Primary Care  Doctor   Email your healthcare team securely and privately 24/7 X X X    Manage appointments: schedule your next appointment; view details of past/upcoming appointments X      Request prescription refills. X      View recent personal medical records, including lab and immunizations X X X X   View health record, including health history, allergies, medications X X X X   Read reports about your outpatient visits, procedures, consult and ER notes X X X X   See your discharge summary, which is a recap of your hospital and/or ER visit that includes your diagnosis, lab results, and care plan. X X       How to register for Combat Stroke:  1. Go to  [email or web address].  2. Click on the Sign Up Now box, which takes you to the New Member Sign Up page. You will need to provide the following information:  a. Enter your Combat Stroke Access Code exactly as it appears at the top of this page. (You will not need to use this code after you’ve completed the sign-up process. If you do not sign up before the expiration date, you must request a new code.)   b. Enter your date of birth.   c. Enter your home email address.   d. Click Submit, and follow the next screen’s instructions.  3. Create a Combat Stroke ID. This will be your Combat Stroke login ID and cannot be changed, so think of one that is secure and easy to remember.  4. Create a Combat Stroke password. You can change your password at any time.  5. Enter your Password Reset Question  and Answer. This can be used at a later time if you forget your password.   6. Enter your e-mail address. This allows you to receive e-mail notifications when new information is available in Intelligroup.  7. Click Sign Up. You can now view your health information.    For assistance activating your Intelligroup account, call [phone]

## 2017-01-23 NOTE — PROGRESS NOTES
Chief Complaint   Patient presents with   • Other     DUB/Ectopic      History of present illness:   21 y.o.  with left unruptured EP s/p MTX  (2017) presents for ff-up  No abdominal pain, (+) vaginal bleeding - minimal  BHCG increase slightly from Day 4 to Day 7 (1082 ---> 1577)    Review of systems:  Pertinent positives documented in HPI and all other systems reviewed & are negative    All PMH, PSH, allergies, social history and FH reviewed and updated today:  Past Medical History   Diagnosis Date   • Anemia        Past Surgical History   Procedure Laterality Date   • Eye surgery  2016     bilateral       Allergies: No Known Allergies    Social History     Social History   • Marital Status: Single     Spouse Name: N/A   • Number of Children: N/A   • Years of Education: N/A     Occupational History   • Not on file.     Social History Main Topics   • Smoking status: Never Smoker    • Smokeless tobacco: Not on file   • Alcohol Use: No   • Drug Use: No   • Sexual Activity:     Partners: Male     Other Topics Concern   • Not on file     Social History Narrative       Family History   Problem Relation Age of Onset   • Diabetes Maternal Grandmother      Physical exam:  Last menstrual period 12/10/2016.    General:appears stated age, is in no apparent distress, is well developed and well nourished  Head: normocephalic, non-tender  Abdomen: Bowel sounds positive, nondistended, soft, nontender x4, no rebound or guarding. No organomegaly. No masses.  Skin: No rashes, or ulcers or lesions seen  Psychiatric: Patient shows appropriate affect, is alert and oriented x3, intact judgment and insight.    1. Tubal pregnancy without intrauterine pregnancy- MTX tx     2. TVS bedside : empty uterus with thin ES, (+) free FF in the CDS - seemed decreased. Heterogenous mass previously seen 2 x 1 cm - not visible. Bilateral ovaries seen.   3. Pt will be sent to ER for 2nd MTX. Precautions discussed with her  4. All  questions addressed. Options for Tx discussed - MTX vs surgical via laparoscopy  5. Ff-up BHCG.   6. Pelvic rest  7. Pain and bleeding precautions

## 2017-01-23 NOTE — DISCHARGE INSTRUCTIONS
Tratamiento con metotrexato para el embarazo ectópico  (Methotrexate Treatment for an Ectopic Pregnancy)  El metotrexato es un medicamento que se usa para interrumpir el crecimiento de un óvulo fecundado y así detener un embarazo ectópico. También ayuda a que el cuerpo absorba el tejido del óvulo, lo que demora de 2 a 6 semanas. La mayoría de los embarazos ectópicos pueden tratarse satisfactoriamente con metotrexato si se detectan en helen etapa inicial.  INFORME AL MÉDICO:  · Cualquier alergia que tenga.  · Todos los medicamentos que utiliza, incluidos vitaminas, hierbas, gotas oftálmicas, cremas y medicamentos de venta mitch.  · Enfermedades que tenga.  RIESGOS Y COMPLICACIONES  En general, es un tratamiento seguro. Sin embargo, janeth en cualquier tratamiento, pueden surgir problemas. Entre los problemas y efectos secundarios posibles, se incluyen:  · Náuseas.  · Vómitos.  · Diarrea.  · Cólicos abdominales.  · Llagas en la boca.  · Aumento de la hemorragia vaginal.  · Hinchazón o irritación del revestimiento pulmonar (neumonitis).  · Fracaso del tratamiento y continuación del embarazo.  · Daño hepático.  · Pérdida del lydia.  También existe riesgo de ruptura del embarazo ectópico cuando se utiliza el metotrexato.  ANTES DEL PROCEDIMIENTO  Antes de la aplicación del medicamento:   · Le realizarán pruebas hepáticas, renales y análisis de trever completo.  · Los análisis de trever se hacen para medir los niveles de hormonas del embarazo y determinar carrillo tipo sanguíneo.  · Si usted es Rh negativo y el padre es Rh positivo, o no sabe carrillo Rh, usted recibirá helen inyección de inmunoglobulina Rho(D).  PROCEDIMIENTO   El médico puede recetarle helen de las dos formas de administración del metotrexato. Helen forma consiste en helen sly dosis o inyección del medicamento. La otra forma consiste en helen serie de dosis administradas mediante diversas inyecciones.   DESPUÉS DEL PROCEDIMIENTO  · Puede tener algunos cólicos abdominales,  hemorragia vaginal y fatiga tg los primeros días después de recibir el metotrexato.  · Tg algunas semanas le harán análisis de trever para controlar los niveles de hormonas del embarazo. Se realizarán análisis de trever hasta que no se detecten más hormonas del embarazo en la trever.     Esta información no tiene janeth fin reemplazar el consejo del médico. Asegúrese de hacerle al médico cualquier pregunta que tenga.     Document Released: 09/27/2006 Document Revised: 12/23/2014  [employer] Interactive Patient Education ©2016 Elsevier Inc.  Methotrexate Treatment for an Ectopic Pregnancy  Methotrexate is a medicine that treats ectopic pregnancy by stopping the growth of the fertilized egg. It also helps your body absorb tissue from the egg. This takes between 2 weeks and 6 weeks. Most ectopic pregnancies can be successfully treated with methotrexate if they are detected early enough.  LET YOUR HEALTH CARE PROVIDER KNOW ABOUT:  · Any allergies you have.  · All medicines you are taking, including vitamins, herbs, eye drops, creams, and over-the-counter medicines.  · Medical conditions you have.  RISKS AND COMPLICATIONS  Generally, this is a safe treatment. However, as with any treatment, problems can occur. Possible problems or side effects include:  · Nausea.  · Vomiting.  · Diarrhea.  · Abdominal cramping.  · Mouth sores.  · Increased vaginal bleeding or spotting.    · Swelling or irritation of the lining of your lungs (pneumonitis).   · Failed treatment and continuation of the pregnancy.    · Liver damage.  · Hair loss.  There is still a risk of the ectopic pregnancy rupturing while using the methotrexate.  BEFORE THE PROCEDURE  Before you take the medicine:   · Liver tests, kidney tests, and a complete blood test are performed.  · Blood tests are performed to measure the pregnancy hormone levels and to determine your blood type.  · If you are Rh-negative and the father is Rh-positive or his Rh type is not  known, you will be given a Rho (D) immune globulin shot.  PROCEDURE   There are two methods that your health care provider may use to prescribe methotrexate. One method involves a single dose or injection of the medicine. Another method involves a series of doses given through several injections.   AFTER THE PROCEDURE  · You may have some abdominal cramping, vaginal bleeding, and fatigue in the first few days after taking methotrexate.  · Blood tests will be taken for several weeks to check the pregnancy hormone levels. The blood tests are performed until there is no more pregnancy hormone detected in the blood.     This information is not intended to replace advice given to you by your health care provider. Make sure you discuss any questions you have with your health care provider.     Document Released: 12/12/2002 Document Revised: 01/08/2016 Document Reviewed: 10/06/2014  Elsevier Interactive Patient Education ©2016 Elsevier Inc.

## 2017-01-23 NOTE — ED NOTES
Pt sent by OB/GYN for methotrexate injection for ectopic pregnancy. Pt's AAOx4. Pt denies any pain.

## 2017-01-23 NOTE — ED PROVIDER NOTES
"ED Provider Note    CHIEF COMPLAINT   Chief Complaint   Patient presents with   • Pregnancy     pt states she needs methotrexate injection for ectopic pregnancy.       HPI   Karlee Munoz is a 21 y.o. female who presents at the direction of her gynecologist Dr. Scott .  Patient has known ectopic pregnancy with one previous dose of methotrexate, secondary to rising beta hCG, a 2nd dose was ordered for Mr. VELEZ she is in the emergency department by her gynecologist.  Patient herself at this time denies discomfort.  She had previous vaginal bleeding which is now nearly resolved.  She denies vomiting or dizziness.  Patient states this was her 1st pregnancy.    REVIEW OF SYSTEMS  Genitourinary currently no vaginal bleeding  Constitutional: No dizziness or fever  Gastrointestinal: No abdominal pain  Musculoskeletal no back pain             PAST MEDICAL HISTORY  Past Medical History   Diagnosis Date   • Anemia 2015       FAMILY HISTORY  Family History   Problem Relation Age of Onset   • Diabetes Maternal Grandmother        SOCIAL HISTORY  Social History     Social History   • Marital Status: Single     Spouse Name: N/A   • Number of Children: N/A   • Years of Education: N/A     Social History Main Topics   • Smoking status: Never Smoker    • Smokeless tobacco: None   • Alcohol Use: No   • Drug Use: No   • Sexual Activity:     Partners: Male     Other Topics Concern   • None     Social History Narrative       SURGICAL HISTORY  Past Surgical History   Procedure Laterality Date   • Eye surgery  06/2016     bilateral       CURRENT MEDICATIONS  No current facility-administered medications on file prior to encounter.     No current outpatient prescriptions on file prior to encounter.       ALLERGIES  No Known Allergies    PHYSICAL EXAM  VITAL SIGNS: /61 mmHg  Pulse 85  Temp(Src) 37.1 °C (98.7 °F)  Resp 16  Ht 1.676 m (5' 5.98\")  Wt 75.6 kg (166 lb 10.7 oz)  BMI 26.91 kg/m2  SpO2 97%  LMP 12/10/2016 (Exact " Date)  Constitutional:  Well-nourished  HENT:  Atraumatic, Bilateral external ears normal, Oropharynx moist  Eyes:  Conjunctiva normal, No discharge.   Cardiovascular: Normal heart rate, Normal rhythm, No murmurs, No rubs, No gallops.   Pulmonary: Normal breath sounds, No respiratory distress  Abdomen: Soft and nontender, no guarding  Genitourinary: Deferred  Skin: Warm, Dry, No erythema, No rash.   Musculoskeletal: No tenderness, No CVA tenderness.   Neurologic: Alert & oriented, No focal deficits noted.         COURSE & MEDICAL DECISION MAKING  Pertinent Labs & Imaging studies reviewed. (See chart for details)  Patient received intramuscular methotrexate at the order of her gynecologist.  She was observed for approximately one hour had no adverse effects, this 1 hour observation time was advised by her treating gynecologist.  Patient is discharged with follow-up to her gynecologist next week, she is advised to go to [facility] if worse or for any concerns.    FINAL IMPRESSION  1. Ectopic pregnancy, unspecified location, unspecified whether intrauterine pregnancy present    2. On methotrexate therapy            Electronically signed by: Pb Menjivar, 1/23/2017 6:22 PM

## 2017-01-23 NOTE — TELEPHONE ENCOUNTER
Per Dr. Mathieu Connelly would like to see patient at the private office in [facility]. I called patient and notified her she is scheduled at 1:00 pm with Dr. Mathieu Connelly. Pt voiced understanding and will comply. Pt had no other questions or concerns. Sandra Vivar R.N. Spoke with Karen HARDING and appointment was confirmed for 1:00 p.m.

## 2017-01-23 NOTE — TELEPHONE ENCOUNTER
Dr. Hammonds received patient's HCG results and asked to call patient and have her go to [facility] asap as she needs another MTX shot, per Dr. Mathieu Connelly. I notified patient of Doctor's recommendations and pt voiced understanding. Pt will comply and go into ER right now.

## 2017-01-23 NOTE — ED AVS SNAPSHOT
1/23/2017          Karlee Munoz  [address] [place] [address]    Dear Karlee:    Critical access hospital wants to ensure your discharge home is safe and you or your loved ones have had all your questions answered regarding your care after you leave the hospital.    You may receive a telephone call within two days of your discharge.  This call is to make certain you understand your discharge instructions as well as ensure we provided you with the best care possible during your stay with us.     The call will only last approximately 3-5 minutes and will be done by a nurse.    Once again, we want to ensure your discharge home is safe and that you have a clear understanding of any next steps in your care.  If you have any questions or concerns, please do not hesitate to contact us, we are here for you.  Thank you for choosing [facility] for your healthcare needs.    Sincerely,    Bipin Paiz    [facility]

## 2017-01-23 NOTE — ED AVS SNAPSHOT
After Visit Summary                                                                                                                Karlee Munoz   MRN: [id number]    Department:  [facility], Emergency Dept   Date of Visit:  1/23/2017            [facility], Emergency Dept    [address]    [place] [address]    Phone:  [phone]      You were seen by     Pb Menjivar M.D.      Your Diagnosis Was     Ectopic pregnancy, unspecified location, unspecified whether intrauterine pregnancy present     O00.90       These are the medications you received during your hospitalization from 01/23/2017 1324 to 01/23/2017 1544     Date/Time Order Dose Route Action    01/23/2017 1433 methotrexate PF 94 mg in syringe 3.76 mL Chemotherapy 94 mg Intramuscular Given      Follow-up Information     1. Schedule an appointment as soon as possible for a visit with Maricel Vicente M.D..    Specialty:  OB/Gyn    Why:  see your doctor next week.  If worse go to [facility]    Contact information    [address]  [place] [address] [phone]        Medication Information     Review all of your home medications and newly ordered medications with your primary doctor and/or pharmacist as soon as possible. Follow medication instructions as directed by your doctor and/or pharmacist.     Please keep your complete medication list with you and share with your physician. Update the information when medications are discontinued, doses are changed, or new medications (including over-the-counter products) are added; and carry medication information at all times in the event of emergency situations.               Medication List      Notice     You have not been prescribed any medications.              Discharge Instructions       Tratamiento con metotrexato para el embarazo ectópico  (Methotrexate Treatment for an Ectopic Pregnancy)  El  metotrexato es un medicamento que se usa para interrumpir el crecimiento de un óvulo fecundado y así detener un embarazo ectópico. También ayuda a que el cuerpo absorba el tejido del óvulo, lo que demora de 2 a 6 semanas. La mayoría de los embarazos ectópicos pueden tratarse satisfactoriamente con metotrexato si se detectan en helen etapa inicial.  INFORME AL MÉDICO:  · Cualquier alergia que tenga.  · Todos los medicamentos que utiliza, incluidos vitaminas, hierbas, gotas oftálmicas, cremas y medicamentos de venta mitch.  · Enfermedades que tenga.  RIESGOS Y COMPLICACIONES  En general, es un tratamiento seguro. Sin embargo, janeth en cualquier tratamiento, pueden surgir problemas. Entre los problemas y efectos secundarios posibles, se incluyen:  · Náuseas.  · Vómitos.  · Diarrea.  · Cólicos abdominales.  · Llagas en la boca.  · Aumento de la hemorragia vaginal.  · Hinchazón o irritación del revestimiento pulmonar (neumonitis).  · Fracaso del tratamiento y continuación del embarazo.  · Daño hepático.  · Pérdida del lydia.  También existe riesgo de ruptura del embarazo ectópico cuando se utiliza el metotrexato.  ANTES DEL PROCEDIMIENTO  Antes de la aplicación del medicamento:   · Le realizarán pruebas hepáticas, renales y análisis de trever completo.  · Los análisis de trever se hacen para medir los niveles de hormonas del embarazo y determinar carrillo tipo sanguíneo.  · Si usted es Rh negativo y el padre es Rh positivo, o no sabe carrillo Rh, usted recibirá helen inyección de inmunoglobulina Rho(D).  PROCEDIMIENTO   El médico puede recetarle helen de las dos formas de administración del metotrexato. Helen forma consiste en helen sly dosis o inyección del medicamento. La otra forma consiste en helen serie de dosis administradas mediante diversas inyecciones.   DESPUÉS DEL PROCEDIMIENTO  · Puede tener algunos cólicos abdominales, hemorragia vaginal y fatiga tg los primeros días después de recibir el metotrexato.  · Tg algunas  semanas le harán análisis de trever para controlar los niveles de hormonas del embarazo. Se realizarán análisis de trever hasta que no se detecten más hormonas del embarazo en la trever.     Esta información no tiene janeth fin reemplazar el consejo del médico. Asegúrese de hacerle al médico cualquier pregunta que tenga.     Document Released: 09/27/2006 Document Revised: 12/23/2014  [employer] Interactive Patient Education ©2016 Elsevier Inc.  Methotrexate Treatment for an Ectopic Pregnancy  Methotrexate is a medicine that treats ectopic pregnancy by stopping the growth of the fertilized egg. It also helps your body absorb tissue from the egg. This takes between 2 weeks and 6 weeks. Most ectopic pregnancies can be successfully treated with methotrexate if they are detected early enough.  LET YOUR HEALTH CARE PROVIDER KNOW ABOUT:  · Any allergies you have.  · All medicines you are taking, including vitamins, herbs, eye drops, creams, and over-the-counter medicines.  · Medical conditions you have.  RISKS AND COMPLICATIONS  Generally, this is a safe treatment. However, as with any treatment, problems can occur. Possible problems or side effects include:  · Nausea.  · Vomiting.  · Diarrhea.  · Abdominal cramping.  · Mouth sores.  · Increased vaginal bleeding or spotting.    · Swelling or irritation of the lining of your lungs (pneumonitis).   · Failed treatment and continuation of the pregnancy.    · Liver damage.  · Hair loss.  There is still a risk of the ectopic pregnancy rupturing while using the methotrexate.  BEFORE THE PROCEDURE  Before you take the medicine:   · Liver tests, kidney tests, and a complete blood test are performed.  · Blood tests are performed to measure the pregnancy hormone levels and to determine your blood type.  · If you are Rh-negative and the father is Rh-positive or his Rh type is not known, you will be given a Rho (D) immune globulin shot.  PROCEDURE   There are two methods that your health  care provider may use to prescribe methotrexate. One method involves a single dose or injection of the medicine. Another method involves a series of doses given through several injections.   AFTER THE PROCEDURE  · You may have some abdominal cramping, vaginal bleeding, and fatigue in the first few days after taking methotrexate.  · Blood tests will be taken for several weeks to check the pregnancy hormone levels. The blood tests are performed until there is no more pregnancy hormone detected in the blood.     This information is not intended to replace advice given to you by your health care provider. Make sure you discuss any questions you have with your health care provider.     Document Released: 12/12/2002 Document Revised: 01/08/2016 Document Reviewed: 10/06/2014  [employer] Interactive Patient Education ©2016 [employer].            Patient Information     Patient Information    Following emergency treatment: all patient requiring follow-up care must return either to a private physician or a clinic if your condition worsens before you are able to obtain further medical attention, please return to the emergency room.     Billing Information    At [facility], we work to make the billing process streamlined for our patients.  Our Representatives are here to answer any questions you may have regarding your hospital bill.  If you have insurance coverage and have supplied your insurance information to us, we will submit a claim to your insurer on your behalf.  Should you have any questions regarding your bill, we can be reached online or by phone as follows:  Online: You are able pay your bills online or live chat with our representatives about any billing questions you may have. We are here to help Monday - Friday from 8:00am to 7:30pm and 9:00am - 12:00pm on Saturdays.  Please visit [email or web address]  for more information.   Phone:  [phone] or [phone]    Please  note that your emergency physician, surgeon, pathologist, radiologist, anesthesiologist, and other specialists are not employed by [facility] and will therefore bill separately for their services.  Please contact them directly for any questions concerning their bills at the numbers below:     Emergency Physician Services:  [phone]  [facility]:  [phone]  [facility]:  [phone]  [facility]:  [phone]    1. Your final bill may vary from the amount quoted upon discharge if all procedures are not complete at that time, or if your doctor has additional procedures of which we are not aware. You will receive an additional bill if you return to the Emergency Department at [facility] for suture removal regardless of the facility of which the sutures were placed.     2. Please arrange for settlement of this account at the emergency registration.    3. All self-pay accounts are due in full at the time of treatment.  If you are unable to meet this obligation then payment is expected within 4-5 days.     4. If you have had radiology studies (CT, X-ray, Ultrasound, MRI), you have received a preliminary result during your emergency department visit. Please contact the radiology department [phone] to receive a copy of your final result. Please discuss the Final result with your primary physician or with the follow up physician provided.     Crisis Hotline:  [place] Crisis Hotline:  5-917-DRHMYZL or 1-473.915.1103  [place] Crisis Hotline:    1-523.608.4639 or 144-744-8511         ED Discharge Follow Up Questions    1. In order to provide you with very good care, we would like to follow up with a phone call in the next few days.  May we have your permission to contact you?     YES /  NO    2. What is the best phone number to call you? (       )_____-__________    3. What is the best time to call you?      Morning  /  Afternoon  /  Evening                    Patient Signature:  ____________________________________________________________    Date:  ____________________________________________________________

## 2017-01-23 NOTE — PROGRESS NOTES
"Pharmacy Methotrexate for Ectopic Pregnancy Calculation Note       Wt 75.6 kg Ht 66 in  BSA ([place]) 1.88 m2    Pertinent Labs:  SCr 0.54  T. Bili 0.4, AST 11, ALT 11    Dx: Ectopic Pregnancy  Usual dose: Methotrexate 50 mg/m2 IM x 1 dose   Reference:   Lynette PAK, \"Clinical Practice. Ectopic Pregnancy,\" N Engl J Med, 2009, 361(4):379-87.      Calculations:  Methotrexate 50 mg/m2 x 1.88 m2 = 94 mg  Final Dose = 94 mg IM   Final Volume in syringe using Methotrexate 25 mg/mL injection = 3.76 mL (Syringe 1 2 mL, Syringe 2 1.76)   (Split volumes > 3 ml into two syringes)    Anshu King PharmD BCPS      "

## 2017-01-23 NOTE — ED AVS SNAPSHOT
[employer] Access Code: [email or web address]  Expires: 2/8/2017 12:40 PM    Your email address is not on file at [facility].  Email Addresses are required for you to sign up for [employer], please contact [phone] to verify your personal information and to provide your email address prior to attempting to register for [employer].    Karlee Munoz  [address] [place] [address]    [employer]  A secure, online tool to manage your health information     [facility]’s [employer]® is a secure, online tool that connects you to your personalized health information from the privacy of your home -- day or night - making it very easy for you to manage your healthcare. Once the activation process is completed, you can even access your medical information using the [employer] dennis, which is available for free in the Apple Dennis store or Google Play store.     To learn more about [employer], visit [email or web address]    There are two levels of access available (as shown below):   My Chart Features  [facility] Primary Care Doctor [facility]  Specialists [facility]  Urgent  Care Non-[facility] Primary Care Doctor   Email your healthcare team securely and privately 24/7 X X X    Manage appointments: schedule your next appointment; view details of past/upcoming appointments X      Request prescription refills. X      View recent personal medical records, including lab and immunizations X X X X   View health record, including health history, allergies, medications X X X X   Read reports about your outpatient visits, procedures, consult and ER notes X X X X   See your discharge summary, which is a recap of your hospital and/or ER visit that includes your diagnosis, lab results, and care plan X X  X     How to register for [employer]:  Once your e-mail address has been verified, follow the following steps to sign up for [employer].     1. Go to  [email or web address]  2. Click on the Sign Up Now box, which takes you to the New Member Sign Up page. You  will need to provide the following information:  a. Enter your [employer] Access Code exactly as it appears at the top of this page. (You will not need to use this code after you’ve completed the sign-up process. If you do not sign up before the expiration date, you must request a new code.)   b. Enter your date of birth.   c. Enter your home email address.   d. Click Submit, and follow the next screen’s instructions.  3. Create a My[employer] ID. This will be your [employer] login ID and cannot be changed, so think of one that is secure and easy to remember.  4. Create a [employer] password. You can change your password at any time.  5. Enter your Password Reset Question and Answer. This can be used at a later time if you forget your password.   6. Enter your e-mail address. This allows you to receive e-mail notifications when new information is available in [employer].  7. Click Sign Up. You can now view your health information.    For assistance activating your [employer] account, call [phone]

## 2017-01-25 ENCOUNTER — TELEPHONE (OUTPATIENT)
Dept: OBGYN | Facility: CLINIC | Age: 22
End: 2017-01-25

## 2017-01-25 DIAGNOSIS — O00.109 TUBAL PREGNANCY WITHOUT INTRAUTERINE PREGNANCY: ICD-10-CM

## 2017-01-25 NOTE — TELEPHONE ENCOUNTER
----- Message from Maricel Vicente M.D. sent at 1/25/2017  1:38 PM PST -----  Please call pt to have [facility] blood work done on jan 26 and jan 29 or 30.   Will ff-up please.      Called pt and states she has order for 1/26 blood work but not for 1/29 or 1/30. Advised for pt to come in to [facility] and  the order. Pt agreed.   Per Dr. Mathieu Connelly to bring pt in on 1/31/17 for f/u  1556 called pt back and scheduled her for f/u on 1/31/17 at 0800. Pt agreed.

## 2017-01-31 ENCOUNTER — GYNECOLOGY VISIT (OUTPATIENT)
Dept: OBGYN | Facility: CLINIC | Age: 22
End: 2017-01-31
Payer: MEDICAID

## 2017-01-31 VITALS — BODY MASS INDEX: 26.81 KG/M2 | DIASTOLIC BLOOD PRESSURE: 74 MMHG | WEIGHT: 166 LBS | SYSTOLIC BLOOD PRESSURE: 118 MMHG

## 2017-01-31 DIAGNOSIS — O00.109 TUBAL PREGNANCY WITHOUT INTRAUTERINE PREGNANCY: ICD-10-CM

## 2017-01-31 PROCEDURE — 99213 OFFICE O/P EST LOW 20 MIN: CPT | Performed by: OBSTETRICS & GYNECOLOGY

## 2017-01-31 NOTE — PROGRESS NOTES
Chief Complaint   Patient presents with   • Gynecologic Exam     ectopic pregnancy       History of present illness:   21 y.o.  received 2nd dose of MTX last week presents for ff-up  D4 - BHCG - 725  D7 - BHCG - 632  Pt is doing well  Some vaginal spotting  No abdominal no pelvic pains or cramping    Review of systems:  Pertinent positives documented in HPI and all other systems reviewed & are negative    All PMH, PSH, allergies, social history and FH reviewed and updated today:  Past Medical History   Diagnosis Date   • Anemia        Past Surgical History   Procedure Laterality Date   • Eye surgery  2016     bilateral       Allergies: No Known Allergies    Social History     Social History   • Marital Status: Single     Spouse Name: N/A   • Number of Children: N/A   • Years of Education: N/A     Occupational History   • Not on file.     Social History Main Topics   • Smoking status: Never Smoker    • Smokeless tobacco: Not on file   • Alcohol Use: No   • Drug Use: No   • Sexual Activity:     Partners: Male     Other Topics Concern   • Not on file     Social History Narrative       Family History   Problem Relation Age of Onset   • Diabetes Maternal Grandmother    Physical exam:  Blood pressure 118/74, weight 75.297 kg (166 lb), last menstrual period 12/10/2016.    General:appears stated age, is in no apparent distress, is well developed and well nourished  Skin: No rashes, or ulcers or lesions seen  Psychiatric: Patient shows appropriate affect, is alert and oriented x3, intact judgment and insight.    1. Tubal pregnancy without intrauterine pregnancy- s/p MTX (2017)  HCG QUANTITATIVE    HCG QUANTITATIVE   2. BHCG decreasing appropriately  3. Ff-up BHCG 1 week  4. Pain/ectopic precautions  5. Gyn ff-up with Hillary Connelly

## 2017-01-31 NOTE — MR AVS SNAPSHOT
Karlee Munoz   2017 8:00 AM   Gynecology Visit   MRN: [id number]    Department:  [facility]   Dept Phone:  [phone]    Description:  Female : 1995   Provider:  Maricel Vicente M.D.           Reason for Visit     Gynecologic Exam ectopic pregnancy      Allergies as of 2017     No Known Allergies      You were diagnosed with     Tubal pregnancy without intrauterine pregnancy   [633.10.ICD-9-CM]         Vital Signs     Blood Pressure Weight Last Menstrual Period Smoking Status          118/74 mmHg 75.297 kg (166 lb) 12/10/2016 (Exact Date) Never Smoker         Basic Information     Date Of Birth Sex Race Ethnicity Preferred Language    1995 Female White  Origin (Khmer,Lao,Sao Tomean,Pascual, etc) English      Problem List              ICD-10-CM Priority Class Noted - Resolved    Tubal pregnancy without intrauterine pregnancy- s/p MTX (2017) O00.10   2017 - Present      Health Maintenance        Date Due Completion Dates    IMM HEP B VACCINE (1 of 3 - Primary Series) 1995 ---    IMM HEP A VACCINE (1 of 2 - Standard Series) 4/3/1998 ---    IMM HPV VACCINE (1 of 3 - Female 3 Dose Series) 4/3/2008 ---    IMM VARICELLA (CHICKENPOX) VACCINE (1 of 2 - 2 Dose Adolescent Series) 4/3/2010 ---    IMM MENINGOCOCCAL VACCINE (MCV4) (1 of 1) 4/3/2013 ---    IMM DTaP/Tdap/Td Vaccine (1 - Tdap) 4/3/2016 ---    IMM INFLUENZA (1) 2016 ---            Current Immunizations     No immunizations on file.      Below and/or attached are the medications your provider expects you to take. Review all of your home medications and newly ordered medications with your provider and/or pharmacist. Follow medication instructions as directed by your provider and/or pharmacist. Please keep your medication list with you and share with your provider. Update the information when medications are discontinued, doses are changed, or new medications (including over-the-counter  products) are added; and carry medication information at all times in the event of emergency situations     Allergies:  No Known Allergies          Medications  Valid as of: January 31, 2017 - 10:06 AM    Generic Name Brand Name Tablet Size Instructions for use    .                 Medicines prescribed today were sent to:     [facility] [address] [place] [address], [place] - [address] [facility]    [address] [facility] [place] [address]    Phone: [phone] Fax: [phone]    Open 24 Hours?: No      Medication refill instructions:       If your prescription bottle indicates you have medication refills left, it is not necessary to call your provider’s office. Please contact your pharmacy and they will refill your medication.    If your prescription bottle indicates you do not have any refills left, you may request refills at any time through one of the following ways: The online BooRah system (except Urgent Care), by calling your provider’s office, or by asking your pharmacy to contact your provider’s office with a refill request. Medication refills are processed only during regular business hours and may not be available until the next business day. Your provider may request additional information or to have a follow-up visit with you prior to refilling your medication.   *Please Note: Medication refills are assigned a new Rx number when refilled electronically. Your pharmacy may indicate that no refills were authorized even though a new prescription for the same medication is available at the pharmacy. Please request the medicine by name with the pharmacy before contacting your provider for a refill.        Your To Do List     Future Labs/Procedures Complete By Expires    HCG QUANTITATIVE  As directed 1/31/2018    HCG QUANTITATIVE  As directed 1/31/2018         BooRah Access Code: [email or web address]  Expires: 2/8/2017 12:40 PM    BooRah  A secure, online tool to manage your health information     [employer]’s  Compete® is a secure, online tool that connects you to your personalized health information from the privacy of your home -- day or night - making it very easy for you to manage your healthcare. Once the activation process is completed, you can even access your medical information using the Compete dennis, which is available for free in the Apple Dennis store or Google Play store.     Compete provides the following levels of access (as shown below):   My Chart Features   [facility] Primary [facility] Doctor [facility]  Specialists [facility]  Urgent  Care Non-[facility]  Primary Care  Doctor   Email your healthcare team securely and privately 24/7 X X X    Manage appointments: schedule your next appointment; view details of past/upcoming appointments X      Request prescription refills. X      View recent personal medical records, including lab and immunizations X X X X   View health record, including health history, allergies, medications X X X X   Read reports about your outpatient visits, procedures, consult and ER notes X X X X   See your discharge summary, which is a recap of your hospital and/or ER visit that includes your diagnosis, lab results, and care plan. X X       How to register for Compete:  1. Go to  [email or web address].  2. Click on the Sign Up Now box, which takes you to the New Member Sign Up page. You will need to provide the following information:  a. Enter your Compete Access Code exactly as it appears at the top of this page. (You will not need to use this code after you’ve completed the sign-up process. If you do not sign up before the expiration date, you must request a new code.)   b. Enter your date of birth.   c. Enter your home email address.   d. Click Submit, and follow the next screen’s instructions.  3. Create a Compete ID. This will be your Compete login ID and cannot be changed, so think of one that is secure and easy to remember.  4. Create a Compete password. You can change your password at any  time.  5. Enter your Password Reset Question and Answer. This can be used at a later time if you forget your password.   6. Enter your e-mail address. This allows you to receive e-mail notifications when new information is available in [facility].  7. Click Sign Up. You can now view your health information.    For assistance activating your [employer] account, call [phone]

## 2017-02-08 ENCOUNTER — TELEPHONE (OUTPATIENT)
Dept: OBGYN | Facility: CLINIC | Age: 22
End: 2017-02-08

## 2017-02-08 NOTE — TELEPHONE ENCOUNTER
Per Dr. Mathieu Connelly to call pt and informed her Beta HGCs are decreasing but we will need to continue monitor them until they are 0.     Called pt and notified. Pt verbalized understanding and states she has one more order for next Monday 2/13/17.

## 2017-02-16 ENCOUNTER — TELEPHONE (OUTPATIENT)
Dept: OBGYN | Facility: CLINIC | Age: 22
End: 2017-02-16

## 2017-02-16 NOTE — TELEPHONE ENCOUNTER
"Pt walked in clinic today asking for lab results(HCG levels).    Consulted w/Dr. Ceja. Stated needs to do 1 more test in 2 weeks until pregnancy levels reach \"0\".  Pt was notified and advised to do test in 2 weeks. Lab slip to go to [facility].     In basket sent to Dr. Ceja  "

## 2017-03-01 ENCOUNTER — TELEPHONE (OUTPATIENT)
Dept: OBGYN | Facility: CLINIC | Age: 22
End: 2017-03-01

## 2017-03-01 NOTE — TELEPHONE ENCOUNTER
Pt called triage line wanting to know her HCG results. Consulted with Dr. Armando who reviewed pts HCG results and stated pt is no longer pregnant and did not need further testing. Let pt know of the results and pt had no further questions or concerns.

## 2017-04-05 ENCOUNTER — GYNECOLOGY VISIT (OUTPATIENT)
Dept: OBGYN | Facility: CLINIC | Age: 22
End: 2017-04-05
Payer: MEDICAID

## 2017-04-05 VITALS
BODY MASS INDEX: 28.12 KG/M2 | WEIGHT: 175 LBS | DIASTOLIC BLOOD PRESSURE: 60 MMHG | SYSTOLIC BLOOD PRESSURE: 104 MMHG | HEIGHT: 66 IN

## 2017-04-05 DIAGNOSIS — Z30.09 FAMILY PLANNING: ICD-10-CM

## 2017-04-05 DIAGNOSIS — Z30.40 ENCOUNTER FOR SURVEILLANCE OF CONTRACEPTIVES: ICD-10-CM

## 2017-04-05 LAB
INT CON NEG: NEGATIVE
INT CON POS: POSITIVE
POC URINE PREGNANCY TEST: NEGATIVE

## 2017-04-05 PROCEDURE — 99213 OFFICE O/P EST LOW 20 MIN: CPT | Mod: 25 | Performed by: OBSTETRICS & GYNECOLOGY

## 2017-04-05 PROCEDURE — 81025 URINE PREGNANCY TEST: CPT | Performed by: OBSTETRICS & GYNECOLOGY

## 2017-04-05 RX ORDER — LEVONORGESTREL AND ETHINYL ESTRADIOL 0.1-0.02MG
1 KIT ORAL DAILY
Qty: 28 TAB | Refills: 10 | Status: SHIPPED | OUTPATIENT
Start: 2017-04-05 | End: 2018-04-07

## 2017-04-05 NOTE — MR AVS SNAPSHOT
"        Karlee Munoz   2017 8:00 AM   Gynecology Visit   MRN: [id number]    Department:  [facility]   Dept Phone:  [phone]    Description:  Female : 1995   Provider:  David Cantor M.D.           Reason for Visit     Other consultation for birth control      Allergies as of 2017     No Known Allergies      You were diagnosed with     Family planning   [276299]       Encounter for surveillance of contraceptives   [864791]         Vital Signs     Blood Pressure Height Weight Body Mass Index Breastfeeding? Smoking Status    104/60 mmHg 1.676 m (5' 6\") 79.379 kg (175 lb) 28.26 kg/m2 Unknown Never Smoker       Basic Information     Date Of Birth Sex Race Ethnicity Preferred Language    1995 Female White  Origin (Senegalese,Polish,Turkmen,Tristanian, etc) English      Problem List              ICD-10-CM Priority Class Noted - Resolved    Tubal pregnancy without intrauterine pregnancy- s/p MTX (2017) O00.10   2017 - Present      Health Maintenance        Date Due Completion Dates    IMM HEP B VACCINE (1 of 3 - Primary Series) 1995 ---    IMM HEP A VACCINE (1 of 2 - Standard Series) 4/3/1998 ---    IMM HPV VACCINE (1 of 3 - Female 3 Dose Series) 4/3/2008 ---    IMM VARICELLA (CHICKENPOX) VACCINE (1 of 2 - 2 Dose Adolescent Series) 4/3/2010 ---    IMM MENINGOCOCCAL VACCINE (MCV4) (1 of 1) 4/3/2013 ---    IMM DTaP/Tdap/Td Vaccine (1 - Tdap) 4/3/2016 ---            Results     POCT Pregnancy      Component Value Standard Range & Units    POC Urine Pregnancy Test negative Negative    Internal Control Positive Positive     Internal Control Negative Negative                         Current Immunizations     No immunizations on file.      Below and/or attached are the medications your provider expects you to take. Review all of your home medications and newly ordered medications with your provider and/or pharmacist. Follow medication instructions as directed by your provider " and/or pharmacist. Please keep your medication list with you and share with your provider. Update the information when medications are discontinued, doses are changed, or new medications (including over-the-counter products) are added; and carry medication information at all times in the event of emergency situations     Allergies:  No Known Allergies          Medications  Valid as of: April 05, 2017 - 10:35 AM    Generic Name Brand Name Tablet Size Instructions for use    Levonorgestrel-Ethinyl Estrad (Tab) MOLLY TEIXEIRA LESSINA 0.1-20 MG-MCG Take 1 Tab by mouth every day.        .                 Medicines prescribed today were sent to:     [facility] [address] [place] [address], [place] - [address] [facility]    [address] [facility] [address]    Phone: [phone] Fax: [phone]    Open 24 Hours?: No      Medication refill instructions:       If your prescription bottle indicates you have medication refills left, it is not necessary to call your provider’s office. Please contact your pharmacy and they will refill your medication.    If your prescription bottle indicates you do not have any refills left, you may request refills at any time through one of the following ways: The online Celoxica system (except Urgent Care), by calling your provider’s office, or by asking your pharmacy to contact your provider’s office with a refill request. Medication refills are processed only during regular business hours and may not be available until the next business day. Your provider may request additional information or to have a follow-up visit with you prior to refilling your medication.   *Please Note: Medication refills are assigned a new Rx number when refilled electronically. Your pharmacy may indicate that no refills were authorized even though a new prescription for the same medication is available at the pharmacy. Please request the medicine by name with the pharmacy before contacting your provider for a  refill.           MyChart Access Code: Activation code not generated  Current [email or web address] Status: Active

## 2017-04-05 NOTE — PROGRESS NOTES
"Chief complaint;    Karlee Munoz is a 22 y.o.  who presents to discuss birth control methods. Patient is status post tubal pregnancy treated with methotrexate in 2017-hCG titers followed down to zero. Urine HCG is negative today. The patient states she gets a lot of depression on her current OCP but she does not murmur which one she was taking. She is here to discuss all forms of birth control.    Review of systems; denies fever chills abdominal pain, denies chest pain shortness of breath or urinary symptoms  Past medical history-  Past Medical History   Diagnosis Date   • Anemia      Past surgical history-  Past Surgical History   Procedure Laterality Date   • Eye surgery  2016     bilateral     Allergies-Review of patient's allergies indicates no known allergies.  Medications-  No current outpatient prescriptions on file prior to visit.     No current facility-administered medications on file prior to visit.     Social history-  Social History     Social History   • Marital Status: Single     Spouse Name: N/A   • Number of Children: N/A   • Years of Education: N/A     Occupational History   • Not on file.     Social History Main Topics   • Smoking status: Never Smoker    • Smokeless tobacco: Not on file   • Alcohol Use: No   • Drug Use: No   • Sexual Activity:     Partners: Male     Other Topics Concern   • Not on file     Social History Narrative     Past Family History-no history of breast or ovarian cancer    Physical examination;  Alert and oriented x3  General a thin well-developed well-nourished female in no apparent distress  Filed Vitals:    17 0815   BP: 104/60   Height: 1.676 m (5' 6\")   Weight: 79.379 kg (175 lb)     Discussed all forms of birth control including the patch vaginal ring different OCP formulations IUD-I recommend oral contraceptives at a lower dose    Impression;  Family planning  Prescription of oral contraceptives    Plan;  Karina prescription sent to " pharmacy      25  Minutes spent with the patient, greater than 50% of the time spent on counseling and coordination of care. All questions answered in detail.

## 2018-03-16 ENCOUNTER — HOSPITAL ENCOUNTER (OUTPATIENT)
Facility: MEDICAL CENTER | Age: 23
End: 2018-03-16
Attending: OBSTETRICS & GYNECOLOGY
Payer: MEDICAID

## 2018-03-16 ENCOUNTER — GYNECOLOGY VISIT (OUTPATIENT)
Dept: OBGYN | Facility: CLINIC | Age: 23
End: 2018-03-16
Payer: MEDICAID

## 2018-03-16 VITALS
BODY MASS INDEX: 31.98 KG/M2 | WEIGHT: 199 LBS | HEIGHT: 66 IN | SYSTOLIC BLOOD PRESSURE: 120 MMHG | DIASTOLIC BLOOD PRESSURE: 72 MMHG

## 2018-03-16 DIAGNOSIS — N92.6 IRREGULAR PERIODS/MENSTRUAL CYCLES: ICD-10-CM

## 2018-03-16 LAB
AMBIGUOUS DTTM AMBI4: NORMAL
SIGNIFICANT IND 70042: NORMAL
SITE SITE: NORMAL
SOURCE SOURCE: NORMAL

## 2018-03-16 PROCEDURE — 87491 CHLMYD TRACH DNA AMP PROBE: CPT

## 2018-03-16 PROCEDURE — 87591 N.GONORRHOEAE DNA AMP PROB: CPT

## 2018-03-16 PROCEDURE — 99395 PREV VISIT EST AGE 18-39: CPT | Performed by: OBSTETRICS & GYNECOLOGY

## 2018-03-16 NOTE — PROGRESS NOTES
22 y.o.     Female presents as new patient for evaluation of Desires pregnancy       Subjective:Pain:  Pain:  Nature: cramping, Intensity: moderate, Location: deep pelvis, Radiation: Non-radiating  diarrhea :  No          Vaginal discharge: no discharge   Vaginal Bleeding: none  Dysmenorrhea:positive, Dyspareunia:negative  Problems with contraception: wants to get pregnant  Patient descibes irregualr cycles and intervals , and is S/P Ectopic 2017 , Rx with MTX     LMP:  Patient's last menstrual period was 2018.  ROS:  Neurological:Denies, headaches  Breast:{Denies breast tenderness, mass, discharge, changes in size or contour, or abnormal cyclic discomfort.  GastroIntestinalNegative for abdominal discomfort, blood in stools or black stools  Genito-urinary:{Negative for dysuria, frequency and incontinence  Menstrual: bleeding between periods  All other systems reviewed : and are Negative  PMH:  Past Medical History:   Diagnosis Date   • Anemia    • Blood transfusion without reported diagnosis      Past Surgical History:   Procedure Laterality Date   • EYE SURGERY  2016    bilateral   • EYE SURGERY       MTX for Ectopic   U/S 2017 2.4 cm cysdt on right , previously on left resolved : day 14 of cycle !!!!!!  Family History   Problem Relation Age of Onset   • Anemia Mother    • Diabetes Maternal Grandmother      Social History     Social History   • Marital status: Single     Spouse name: N/A   • Number of children: N/A   • Years of education: N/A     Occupational History   • Not on file.     Social History Main Topics   • Smoking status: Never Smoker   • Smokeless tobacco: Never Used   • Alcohol use No   • Drug use: No   • Sexual activity: Yes     Partners: Male      Comment: trying to become pregnant      Other Topics Concern   • Not on file     Social History Narrative   • No narrative on file       Current Outpatient Prescriptions on File Prior to Visit   Medication Sig Dispense Refill   •  "levonorgestrel-ethinyl estradiol (AVIANE, ALESSE, LESSINA) 0.1-20 MG-MCG per tablet Take 1 Tab by mouth every day. (Patient not taking: Reported on 3/16/2018) 28 Tab 10     No current facility-administered medications on file prior to visit.        Summary of Allergies:  Patient has no known allergies.  /72   Ht 1.676 m (5' 6\")   Wt 90.3 kg (199 lb)   LMP 02/28/2018   BMI 32.12 kg/m²   Exam:  Skin: Warm and dry with no lesions or rashes.  Lymph: no inguinal nodes  Neuro: Awake, alert and oriented x 3  ENT:Normal  Eyes: corneas clear  BREAST: negative  Cor:  RRR No M  LUNGS:Normal breath sounds  Abdominal :   Abdomen soft, non-tender. BS normal. No masses or organomegaly, negative findings: no scars, striae, dilated veins, rashes, or lesions, umbilicus normal, symmetric, no bruits heard, liver span normal to percussion, spleen non-palpable  Genito-Urinary:Perineum and external genitalia normal, Vagina normal w/o redness or discharge, Vagina reddened with normal and physiologic discharge, Cervix w/o lesions or discharge uterus mobile , non tender , adnexa No masses   Extremities:no cyanosis, clubbing or edema present    Psych: normal affect  LAB:  Lab:No results found for this or any previous visit (from the past 336 hour(s)).    \ Ass:  Hx of irregular cycles interval   S/P Ectopic : unknown reason   Hx of ovarian cysts     P.  Recommend luteal hormone assessment , then will discuss management   Progesterone day 21/25   TSH/PRL as well   {GC/Chlamydai culture   "

## 2018-03-17 LAB
C TRACH DNA SPEC QL NAA+PROBE: NEGATIVE
N GONORRHOEA DNA SPEC QL NAA+PROBE: NEGATIVE
SPECIMEN SOURCE: NORMAL

## 2018-03-28 LAB
DHEA-S SERPL-MCNC: 174.6 UG/DL (ref 110–431.7)
FSH SERPL-ACNC: 2.4 MIU/ML
LH SERPL-ACNC: 3.5 MIU/ML
PROGEST SERPL-MCNC: 6.8 NG/ML
PROLACTIN SERPL-MCNC: 23 NG/ML (ref 4.8–23.3)
T4 FREE SERPL-MCNC: 1.12 NG/DL (ref 0.82–1.77)
TESTOST FREE SERPL-MCNC: 0.9 PG/ML (ref 0–4.2)
TESTOST SERPL-MCNC: 22 NG/DL (ref 8–48)
TSH SERPL DL<=0.005 MIU/L-ACNC: 1.15 UIU/ML (ref 0.45–4.5)

## 2018-04-04 ENCOUNTER — GYNECOLOGY VISIT (OUTPATIENT)
Dept: OBGYN | Facility: CLINIC | Age: 23
End: 2018-04-04
Payer: MEDICAID

## 2018-04-04 VITALS — BODY MASS INDEX: 32.44 KG/M2 | WEIGHT: 201 LBS | SYSTOLIC BLOOD PRESSURE: 108 MMHG | DIASTOLIC BLOOD PRESSURE: 62 MMHG

## 2018-04-04 DIAGNOSIS — N93.8 DUB (DYSFUNCTIONAL UTERINE BLEEDING): ICD-10-CM

## 2018-04-04 DIAGNOSIS — Z31.69 INFERTILITY COUNSELING: ICD-10-CM

## 2018-04-04 LAB
INT CON NEG: NEGATIVE
INT CON POS: POSITIVE
POC URINE PREGNANCY TEST: NEGATIVE

## 2018-04-04 PROCEDURE — 99213 OFFICE O/P EST LOW 20 MIN: CPT | Mod: 25 | Performed by: OBSTETRICS & GYNECOLOGY

## 2018-04-04 PROCEDURE — 81025 URINE PREGNANCY TEST: CPT | Performed by: OBSTETRICS & GYNECOLOGY

## 2018-04-04 RX ORDER — PNV NO.95/FERROUS FUM/FOLIC AC 28MG-0.8MG
TABLET ORAL
COMMUNITY

## 2018-04-04 RX ORDER — CYANOCOBALAMIN (VITAMIN B-12) 500 MCG
TABLET ORAL
Status: ON HOLD | COMMUNITY
End: 2019-08-08

## 2018-04-04 NOTE — PROGRESS NOTES
Karlee LangfordDavidYoni,  23 y.o.  female presents today with a C/O of :oligomenorrhea. Pt   Patient's last menstrual period was 2018.  Patient last seen on 3/16 , with report of previous irregular cycles , and prev Ectopic 2017 , Treated with MTX .   Patient was to have work up , but apparently was pregnant at that visit  !!!!  No [pain, bleeding at present      Subjective : Nausea/Vomiting: Sometimes:  Abdominal /pelvic cramping : No :   vaginal bleeding:No      Menstrual Flow : moderate   GYN ROS:  no breast pain or new or enlarging lumps on self exam      Past Medical History:   Diagnosis Date   • Anemia    • Blood transfusion without reported diagnosis        Past Surgical History:   Procedure Laterality Date   • EYE SURGERY  2016    bilateral   • EYE SURGERY         Current Birth control:  none    OB History    Para Term  AB Living   1             SAB TAB Ectopic Molar Multiple Live Births                    # Outcome Date GA Lbr Isidro/2nd Weight Sex Delivery Anes PTL Lv   1  17     ECTOPIC         Birth Comments: methotrexate treatment              Allergy:      Patient has no known allergies.    Exam;    /62   Wt 91.2 kg (201 lb)   LMP 2018   BMI 32.44 kg/m²       Recent Results (from the past 336 hour(s))   TSH+PRL+FSH+TESTT+LH+T4F+DH    Collection Time: 18 11:06 AM   Result Value Ref Range    TSH 1.150 0.450 - 4.500 uIU/mL    Free T-4 1.12 0.82 - 1.77 ng/dL    Testosterone 22 8 - 48 ng/dL    Free Testosterone 0.9 0.0 - 4.2 pg/mL    LH 3.5 mIU/mL    FSH 2.4 mIU/mL    Prolactin 23.0 4.8 - 23.3 ng/mL    Dhea-S 174.6 110.0 - 431.7 ug/dL   PROGESTERONE    Collection Time: 18 11:06 AM   Result Value Ref Range    Progesterone 6.8 ng/mL   POCT Pregnancy    Collection Time: 18  3:10 PM   Result Value Ref Range    POC Urine Pregnancy Test Negative Negative    Internal Control Positive Positive     Internal Control Negative Negative      Day  21 : 3/20/2018 : progesterone at [facility]  6.0 ng.ml     Ultrasound :     To early     Assessment:    Positive pregnancy   Hx of Ectopic   Hx of irregular cycles   Low progesterone : At risk for recurrent Ectopic       Plan:  Serial prog/hcg levels   Start Supp 100 mg BID   Need early Transvaginal U/S   Ectopic precautions

## 2018-04-04 NOTE — NON-PROVIDER
Pt here for infertility consult f/u  Pt states she took a home UPT and was positive. In office UPT was also Positive  Pharmacy confirmed with pt,  #: [phone]  C/O: spotting and is very scared for anther etopic pregnancy.

## 2018-04-05 ENCOUNTER — HOSPITAL ENCOUNTER (OUTPATIENT)
Dept: LAB | Facility: MEDICAL CENTER | Age: 23
End: 2018-04-05
Attending: OBSTETRICS & GYNECOLOGY
Payer: MEDICAID

## 2018-04-05 ENCOUNTER — HOSPITAL ENCOUNTER (EMERGENCY)
Facility: MEDICAL CENTER | Age: 23
End: 2018-04-05
Attending: EMERGENCY MEDICINE
Payer: MEDICAID

## 2018-04-05 ENCOUNTER — TELEPHONE (OUTPATIENT)
Dept: OBGYN | Facility: CLINIC | Age: 23
End: 2018-04-05

## 2018-04-05 ENCOUNTER — APPOINTMENT (OUTPATIENT)
Dept: RADIOLOGY | Facility: MEDICAL CENTER | Age: 23
End: 2018-04-05
Attending: EMERGENCY MEDICINE
Payer: MEDICAID

## 2018-04-05 VITALS
HEART RATE: 75 BPM | TEMPERATURE: 98.4 F | WEIGHT: 199.3 LBS | RESPIRATION RATE: 16 BRPM | SYSTOLIC BLOOD PRESSURE: 128 MMHG | HEIGHT: 66 IN | DIASTOLIC BLOOD PRESSURE: 75 MMHG | OXYGEN SATURATION: 98 % | BODY MASS INDEX: 32.03 KG/M2

## 2018-04-05 DIAGNOSIS — N93.8 DUB (DYSFUNCTIONAL UTERINE BLEEDING): ICD-10-CM

## 2018-04-05 DIAGNOSIS — O20.0 THREATENED MISCARRIAGE IN EARLY PREGNANCY: ICD-10-CM

## 2018-04-05 LAB
ALBUMIN SERPL BCP-MCNC: 4.6 G/DL (ref 3.2–4.9)
ALBUMIN/GLOB SERPL: 1.6 G/DL
ALP SERPL-CCNC: 67 U/L (ref 30–99)
ALT SERPL-CCNC: 13 U/L (ref 2–50)
ANION GAP SERPL CALC-SCNC: 7 MMOL/L (ref 0–11.9)
APPEARANCE UR: ABNORMAL
AST SERPL-CCNC: 13 U/L (ref 12–45)
B-HCG SERPL-ACNC: 181.8 MIU/ML (ref 0–5)
B-HCG SERPL-ACNC: 202.7 MIU/ML (ref 0–5)
BASOPHILS # BLD AUTO: 0.4 % (ref 0–1.8)
BASOPHILS # BLD: 0.04 K/UL (ref 0–0.12)
BILIRUB SERPL-MCNC: 0.4 MG/DL (ref 0.1–1.5)
BUN SERPL-MCNC: 13 MG/DL (ref 8–22)
CALCIUM SERPL-MCNC: 9.8 MG/DL (ref 8.5–10.5)
CHLORIDE SERPL-SCNC: 105 MMOL/L (ref 96–112)
CO2 SERPL-SCNC: 24 MMOL/L (ref 20–33)
COLOR UR AUTO: YELLOW
CREAT SERPL-MCNC: 0.64 MG/DL (ref 0.5–1.4)
EOSINOPHIL # BLD AUTO: 0.04 K/UL (ref 0–0.51)
EOSINOPHIL NFR BLD: 0.4 % (ref 0–6.9)
ERYTHROCYTE [DISTWIDTH] IN BLOOD BY AUTOMATED COUNT: 40.5 FL (ref 35.9–50)
GLOBULIN SER CALC-MCNC: 2.9 G/DL (ref 1.9–3.5)
GLUCOSE SERPL-MCNC: 93 MG/DL (ref 65–99)
GLUCOSE UR QL STRIP.AUTO: NEGATIVE MG/DL
HCG UR QL: POSITIVE
HCT VFR BLD AUTO: 42.2 % (ref 37–47)
HGB BLD-MCNC: 14.4 G/DL (ref 12–16)
IMM GRANULOCYTES # BLD AUTO: 0.03 K/UL (ref 0–0.11)
IMM GRANULOCYTES NFR BLD AUTO: 0.3 % (ref 0–0.9)
KETONES UR QL STRIP.AUTO: NEGATIVE MG/DL
LEUKOCYTE ESTERASE UR QL STRIP.AUTO: ABNORMAL
LYMPHOCYTES # BLD AUTO: 2.03 K/UL (ref 1–4.8)
LYMPHOCYTES NFR BLD: 20 % (ref 22–41)
MCH RBC QN AUTO: 29.8 PG (ref 27–33)
MCHC RBC AUTO-ENTMCNC: 34.1 G/DL (ref 33.6–35)
MCV RBC AUTO: 87.4 FL (ref 81.4–97.8)
MONOCYTES # BLD AUTO: 0.63 K/UL (ref 0–0.85)
MONOCYTES NFR BLD AUTO: 6.2 % (ref 0–13.4)
NEUTROPHILS # BLD AUTO: 7.39 K/UL (ref 2–7.15)
NEUTROPHILS NFR BLD: 72.7 % (ref 44–72)
NITRITE UR QL STRIP.AUTO: NEGATIVE
NRBC # BLD AUTO: 0 K/UL
NRBC BLD-RTO: 0 /100 WBC
PH UR STRIP.AUTO: 6.5 [PH]
PLATELET # BLD AUTO: 347 K/UL (ref 164–446)
PMV BLD AUTO: 8.9 FL (ref 9–12.9)
POTASSIUM SERPL-SCNC: 3.8 MMOL/L (ref 3.6–5.5)
PROGEST SERPL-MCNC: 4.51 NG/ML
PROT SERPL-MCNC: 7.5 G/DL (ref 6–8.2)
PROT UR QL STRIP: ABNORMAL MG/DL
RBC # BLD AUTO: 4.83 M/UL (ref 4.2–5.4)
RBC UR QL AUTO: ABNORMAL
SODIUM SERPL-SCNC: 136 MMOL/L (ref 135–145)
SP GR UR: 1.02
WBC # BLD AUTO: 10.2 K/UL (ref 4.8–10.8)

## 2018-04-05 PROCEDURE — 84144 ASSAY OF PROGESTERONE: CPT

## 2018-04-05 PROCEDURE — 84702 CHORIONIC GONADOTROPIN TEST: CPT | Mod: 91

## 2018-04-05 PROCEDURE — 76817 TRANSVAGINAL US OBSTETRIC: CPT

## 2018-04-05 PROCEDURE — 84702 CHORIONIC GONADOTROPIN TEST: CPT

## 2018-04-05 PROCEDURE — 36415 COLL VENOUS BLD VENIPUNCTURE: CPT

## 2018-04-05 PROCEDURE — 81002 URINALYSIS NONAUTO W/O SCOPE: CPT

## 2018-04-05 PROCEDURE — 81025 URINE PREGNANCY TEST: CPT

## 2018-04-05 PROCEDURE — 80053 COMPREHEN METABOLIC PANEL: CPT

## 2018-04-05 PROCEDURE — 99284 EMERGENCY DEPT VISIT MOD MDM: CPT

## 2018-04-05 PROCEDURE — 85025 COMPLETE CBC W/AUTO DIFF WBC: CPT

## 2018-04-05 ASSESSMENT — PAIN SCALES - GENERAL: PAINLEVEL_OUTOF10: 5

## 2018-04-05 NOTE — ED TRIAGE NOTES
Pt to triage c/o lower abd pain and vaginal bleeding since yesterday. Pt 5 weeks pregnant. HX of ectopic pregnancy.    Pt advised to return to triage nurse for any changes or concerns.

## 2018-04-05 NOTE — TELEPHONE ENCOUNTER
Pt called and states she is soaking a half of pad of blood. Pt was seen in office yesterday with Dr. Mcallister and was given HCG labs and Progesterone labs with suppositories. Pt states she took a suppository last night and this morning. Her HCG was done at [facility] when it she should have done it at [facility] as no codes were wrote on paperwork as to pt having [place] Medicaid. Advised pt to get HCG quants done today at the [facility] lab in [place] and another HCG quant done at [facility] on Saturday (2 days apart). Pt repeated back to me what needed to be done so I could assure she understood. I also advised pt to seek medical at the ER if she starts to bleed heavily and soaking a pad every hour. Pt had no further questions.

## 2018-04-05 NOTE — ED PROVIDER NOTES
ED Provider Note    Scribed for Sloan Castañeda M.D. by Deanne Garces. 2018  4:38 PM    Primary care provider: Pcp Pt States None  Means of arrival: walk-in  History obtained from: patient  History limited by: none    CHIEF COMPLAINT  Chief Complaint   Patient presents with   • Vaginal Bleeding       HPI  Karlee Munoz is a 23 y.o. female who presents to the Emergency Department for 5/10 lower abdominal pain onset last night with associated vaginal bleeding and nausea. This discomfort does not radiate anywhere and she is saturating approximately 5 pads per hour. Patient is 5 weeks pregnant with her second pregnancy, , her first being an ectopic pregnancy. Patient denies drug, alcohol, or tobacco use. [facility] She went [facility] yesterday.      No complaints of vomiting, diarrhea    REVIEW OF SYSTEMS  Pertinent positives include abdominal pain, vaginal bleeding, nausea. Pertinent negatives include no vomiting diarrhea.  All other systems reviewed and negative.    PAST MEDICAL HISTORY   has a past medical history of Anemia (); Blood transfusion without reported diagnosis; and Ectopic pregnancy.    SURGICAL HISTORY   has a past surgical history that includes eye surgery (2016) and eye surgery ().    SOCIAL HISTORY  Social History   Substance Use Topics   • Smoking status: Never Smoker   • Smokeless tobacco: Never Used   • Alcohol use No      History   Drug Use No       FAMILY HISTORY  Family History   Problem Relation Age of Onset   • Anemia Mother    • Diabetes Maternal Grandmother        CURRENT MEDICATIONS  No current facility-administered medications on file prior to encounter.      Current Outpatient Prescriptions on File Prior to Encounter   Medication Sig Dispense Refill   • Folic Acid 0.8 MG Cap Take  by mouth.     • Prenatal Vit-Fe Fumarate-FA (PRENATAL VITAMIN) 27-0.8 MG Tab Take  by mouth.     • Progesterone 100 MG Suppos Insert 1  "Suppository in vagina 2 Times a Day. 28 Suppository 3   • levonorgestrel-ethinyl estradiol (AVIANE, CHERISEE, LESSINA) 0.1-20 MG-MCG per tablet Take 1 Tab by mouth every day. (Patient not taking: Reported on 3/16/2018) 28 Tab 10       ALLERGIES  No Known Allergies    PHYSICAL EXAM  VITAL SIGNS: /80   Pulse 99   Temp 36.9 °C (98.4 °F)   Resp 17   Ht 1.676 m (5' 6\")   Wt 90.4 kg (199 lb 4.7 oz)   SpO2 95%   BMI 32.17 kg/m²     Vital signs reviewed.  Constitutional:  Well appearing 23 year old female  Head: PERRL, conjunctiva normal  Mouth/Throat: oropharynx moist  Neck: normal range of motion  Cardiovascular: regular rate and rhythm  Pulmonary/Chest: clear to auscultation  Abdominal: mild RLQ and LLQ, and epigastric tenderness  Pelvic: os is closed, minimal amount of blood in the vault  Musculoskeletal: no pitting edema, pulses intact, negative calf tenderness, negative Ingrid's sign  Lymphadenopathy: none noted  Neurological: normal  Skin: warm and dry, no rashes  Psychiatric: normal mood and affect    LABS  Results for orders placed or performed during the hospital encounter of 04/05/18   CBC WITH DIFFERENTIAL   Result Value Ref Range    WBC 10.2 4.8 - 10.8 K/uL    RBC 4.83 4.20 - 5.40 M/uL    Hemoglobin 14.4 12.0 - 16.0 g/dL    Hematocrit 42.2 37.0 - 47.0 %    MCV 87.4 81.4 - 97.8 fL    MCH 29.8 27.0 - 33.0 pg    MCHC 34.1 33.6 - 35.0 g/dL    RDW 40.5 35.9 - 50.0 fL    Platelet Count 347 164 - 446 K/uL    MPV 8.9 (L) 9.0 - 12.9 fL    Neutrophils-Polys 72.70 (H) 44.00 - 72.00 %    Lymphocytes 20.00 (L) 22.00 - 41.00 %    Monocytes 6.20 0.00 - 13.40 %    Eosinophils 0.40 0.00 - 6.90 %    Basophils 0.40 0.00 - 1.80 %    Immature Granulocytes 0.30 0.00 - 0.90 %    Nucleated RBC 0.00 /100 WBC    Neutrophils (Absolute) 7.39 (H) 2.00 - 7.15 K/uL    Lymphs (Absolute) 2.03 1.00 - 4.80 K/uL    Monos (Absolute) 0.63 0.00 - 0.85 K/uL    Eos (Absolute) 0.04 0.00 - 0.51 K/uL    Baso (Absolute) 0.04 0.00 - 0.12 K/uL    " Immature Granulocytes (abs) 0.03 0.00 - 0.11 K/uL    NRBC (Absolute) 0.00 K/uL   COMP METABOLIC PANEL   Result Value Ref Range    Sodium 136 135 - 145 mmol/L    Potassium 3.8 3.6 - 5.5 mmol/L    Chloride 105 96 - 112 mmol/L    Co2 24 20 - 33 mmol/L    Anion Gap 7.0 0.0 - 11.9    Glucose 93 65 - 99 mg/dL    Bun 13 8 - 22 mg/dL    Creatinine 0.64 0.50 - 1.40 mg/dL    Calcium 9.8 8.5 - 10.5 mg/dL    AST(SGOT) 13 12 - 45 U/L    ALT(SGPT) 13 2 - 50 U/L    Alkaline Phosphatase 67 30 - 99 U/L    Total Bilirubin 0.4 0.1 - 1.5 mg/dL    Albumin 4.6 3.2 - 4.9 g/dL    Total Protein 7.5 6.0 - 8.2 g/dL    Globulin 2.9 1.9 - 3.5 g/dL    A-G Ratio 1.6 g/dL   ESTIMATED GFR   Result Value Ref Range    GFR If African American >60 >60 mL/min/1.73 m 2    GFR If Non African American >60 >60 mL/min/1.73 m 2   BETA-HCG QUANTITATIVE SERUM   Result Value Ref Range    Bhcg 181.8 (H) 0.0 - 5.0 mIU/mL   POC UA   Result Value Ref Range    POC Color Yellow     POC Appearance Cloudy (A)     POC Glucose Negative Negative mg/dL    POC Ketones Negative Negative mg/dL    POC Specific Gravity 1.025 1.005 - 1.030    POC Blood Large (A) Negative    POC Urine PH 6.5 5.0 - 8.0    POC Protein Trace (A) Negative mg/dL    POC Nitrites Negative Negative    POC Leukocyte Esterase Trace (A) Negative   POC URINE PREGNANCY   Result Value Ref Range    POC Urine Pregnancy Test Positive (A) Negative       All labs reviewed by me.    RADIOLOGY  US-OB PELVIS TRANSVAGINAL   Final Result         No intrauterine pregnancy identified. The differential includes normal early intrauterine pregnancy. Spontaneous  or ectopic pregnancy cannot be excluded.      Continued follow-up imaging and serial beta hCG is recommended.              The radiologist's interpretation of all radiological studies have been reviewed by me.    COURSE & MEDICAL DECISION MAKING  Pertinent Labs & Imaging studies reviewed. (See chart for details) The patient's [facility] and past medical   records were reviewed    4:38 PM - Patient seen and examined at bedside. Ordered pelvis transvaginal US, beta-HCG quant, CBC, CMP, urine pregnancy, and UA to evaluate her symptoms. The differential diagnoses include but are not limited to: miscarriage, threatened miscarriage, ectopic pregnancy. I informed the patient that because she has a history of ectopic pregnancy, we will complete labs and imaging to rule out any acute origins of her symptoms. Patient understands and agrees with evaluation plan.    7:07 PM I informed the patient that her imaging did not indicate an intrauterine pregnancy or an ectopic pregnancy. Patient is Rh positive. I explained that we would complete a pelvic exam to rule out anything else, but informed her that her pregnancy hormones, indicate either an early on pregnancy, or a completing miscarriage. I explained that she would have to have her hormones re-checked in 48 hours, explaining that we would schedule some follow up with her prior to discharge. Pelvic completed with female ER miguel chan. Findings above. Patient understands and agrees with discharge at this time.    7:27 PM Paged OBGYN    7:36 PM Spoke with Dr. Alvarez, GYN, about the patient's condition. Agrees to follow up with patient. It then became clear that the patient has been in the [facility] Center. She will therefore need to follow up with the pregnancy Center. She is coming here on Saturday for repeat hCG    7:41 PM I informed the patient of the consultation with GYN, I advised her to return here in 2 days for follow HCG earlier if symptoms worsen. Given strict ectopic return precautions. Patient understands and will be discharged. She [facility] with [facility] as of yesterday.     The patient will return for new or worsening symptoms and is stable at the time of discharge.    DISPOSITION:  Patient will be discharged home in stable condition.    FOLLOW UP:  [facility]  [place], Emergency Dept  [address] [facility] [id number] [phone]  In 2 days  ask for Dr Castañeda Saturday    [facility], Emergency Dept  [address] [facility] [address] [phone]  In 1 day  As needed, If symptoms worsen for severe pain or bleeding      FINAL IMPRESSION  1. Threatened miscarriage in early pregnancy    2. Rule out ectopic pregnancy      IDeanne (Scribe), am scribing for, and in the presence of, Sloan Castañeda M.D..    Electronically signed by: Deanne Garces (Abebeibe), 4/5/2018    ISloan M.D. personally performed the services described in this documentation, as scribed by Deanne Garces in my presence, and it is both accurate and complete.    The note accurately reflects work and decisions made by me.  Sloan Castañeda  4/5/2018  8:22 PM

## 2018-04-05 NOTE — ED TRIAGE NOTES
Patient states onset of heavy vaginal bleeding w/ pelvic and abdominal cramping since 1500 yesterday. Patient states saturated 5 OB pads in 24 hours. Patient states she is passing some small blood clots. Pain rated 7/10. Pain is constant, but intermittently worse at times. Patient states she has hx of ectopic pregnancy and states she is . Patient states she is receiving [facility].

## 2018-04-06 NOTE — ED NOTES
Patient dc'd to home w/ friends and family. Patient alert and oriented x 4. Patient ambulatory w/ steady gait. Patient verbalizes understanding of discharge instructions and per ERP written discharge instructions, to follow up w/ Dr. Castañeda in ED on Saturday for repeat blood test. Patient denies questions upon discharge.

## 2018-04-06 NOTE — DISCHARGE INSTRUCTIONS
Miscarriage  A miscarriage is the loss of an unborn baby (fetus) before the 20th week of pregnancy. The cause is often unknown.  Follow these instructions at home:  · You may need to stay in bed (bed rest), or you may be able to do light activity. Go about activity as told by your doctor.  · Have help at home.  · Write down how many pads you use each day. Write down how soaked they are.  · Do not use tampons. Do not wash out your vagina (douche) or have sex (intercourse) until your doctor approves.  · Only take medicine as told by your doctor.  · Do not take aspirin.  · Keep all doctor visits as told.  · If you or your partner have problems with grieving, talk to your doctor. You can also try counseling. Give yourself time to grieve before trying to get pregnant again.  Get help right away if:  · You have bad cramps or pain in your back or belly (abdomen).  · You have a fever.  · You pass large clumps of blood (clots) from your vagina that are walnut-sized or larger. Save the clumps for your doctor to see.  · You pass large amounts of tissue from your vagina. Save the tissue for your doctor to see.  · You have more bleeding.  · You have thick, bad-smelling fluid (discharge) coming from the vagina.  · You get lightheaded, weak, or you pass out (faint).  · You have chills.  This information is not intended to replace advice given to you by your health care provider. Make sure you discuss any questions you have with your health care provider.  Document Released: 03/11/2013 Document Revised: 05/25/2017 Document Reviewed: 01/17/2013  [employer] Interactive Patient Education © 2017 [employer]      Vaginal Bleeding During Pregnancy  A small amount of bleeding from the vagina can happen anytime during pregnancy. It usually stops on its own. However, some bleeding can be serious. Be sure to tell your doctor about all vaginal bleeding.  HOME CARE  · Get plenty of rest and sleep.  · Stay in bed and only get up to go to the  bathroom as told by your doctor.  · Write down the number of pads you use each day. Note how soaked they are.  · Do not use tampons. Do not clean the vagina with a stream of water (douche).  · Do not have sex (intercourse) or put anything into your vagina. Have this approved by your doctor.  · Save any tissue that comes from your vagina. Show it to your doctor.  · Only take medicine as told by your doctor.  · Follow your doctor's advice about lifting, driving, and physical activity.  GET HELP RIGHT AWAY IF:   · You feel your baby move less or not at all.  · You pass out (faint) while going to the bathroom.  · You have more bleeding.  · You start to have contractions.  · You have severe cramps in your stomach, back, or belly (abdomen).  · You are leaking fluid or have a gush of fluid from your vagina.  · You become lightheaded or weak.  · You have chills.  · You have clumps of tissue or blood clots coming from your vagina.  · You have a fever.  MAKE SURE YOU:   · Understand these instructions.  · Will watch your condition.  · Will get help right away if you are not doing well or get worse.  Document Released: 09/26/2009 Document Revised: 12/04/2013 Document Reviewed: 10/07/2013  [employer]® Patient Information ©2014 [employer].

## 2018-04-07 ENCOUNTER — HOSPITAL ENCOUNTER (EMERGENCY)
Facility: MEDICAL CENTER | Age: 23
End: 2018-04-07
Attending: EMERGENCY MEDICINE
Payer: MEDICAID

## 2018-04-07 VITALS
BODY MASS INDEX: 31.89 KG/M2 | RESPIRATION RATE: 16 BRPM | OXYGEN SATURATION: 98 % | SYSTOLIC BLOOD PRESSURE: 130 MMHG | HEART RATE: 72 BPM | TEMPERATURE: 96.9 F | HEIGHT: 66 IN | DIASTOLIC BLOOD PRESSURE: 66 MMHG | WEIGHT: 198.41 LBS

## 2018-04-07 DIAGNOSIS — O03.9 MISCARRIAGE: ICD-10-CM

## 2018-04-07 LAB — B-HCG SERPL-ACNC: 142.5 MIU/ML (ref 0–5)

## 2018-04-07 PROCEDURE — 84702 CHORIONIC GONADOTROPIN TEST: CPT

## 2018-04-07 PROCEDURE — 99284 EMERGENCY DEPT VISIT MOD MDM: CPT

## 2018-04-07 PROCEDURE — 36415 COLL VENOUS BLD VENIPUNCTURE: CPT

## 2018-04-07 ASSESSMENT — PAIN SCALES - GENERAL: PAINLEVEL_OUTOF10: 0

## 2018-04-07 ASSESSMENT — LIFESTYLE VARIABLES: DO YOU DRINK ALCOHOL: NO

## 2018-04-07 NOTE — ED PROVIDER NOTES
ED Provider Note    Scribed for Sloan Castañeda M.D. by Cj Navarrete. 2018  11:07 AM    Means of arrival: Walk-in  History obtained from: Patient  History limited by: None    CHIEF COMPLAINT  Chief Complaint   Patient presents with   • Vaginal Bleeding     seen for the same 2d ago, here for repeat beta HCG quant        HPI  Karlee Munoz is a 23 y.o. Female who is status  with a prior history of ectopic pregnancy who presents to the Emergency Department after she was seen by me two days ago for a threatened miscarriage. The ultrasound performed at that time was non diagnostic and she had a HCG quantitive of 180. She presents here today for a 48 hour repeat HCG. The patient reports some light vaginal bleeding, but her symptoms have otherwise improved. She denies abnormal vaginal discharge for abdominal pain.    REVIEW OF SYSTEMS  Pertinent positives include HCG follow up and mild vaginal bleeding. Pertinent negatives include no abnormal vaginal discharge for abdominal pain.  E    PAST MEDICAL HISTORY   has a past medical history of Anemia (); Blood transfusion without reported diagnosis; and Ectopic pregnancy.    SURGICAL HISTORY   has a past surgical history that includes eye surgery (2016) and eye surgery ().    SOCIAL HISTORY  Social History   Substance Use Topics   • Smoking status: Never Smoker   • Smokeless tobacco: Never Used   • Alcohol use No      History   Drug Use No       FAMILY HISTORY  Family History   Problem Relation Age of Onset   • Anemia Mother    • Diabetes Maternal Grandmother        CURRENT MEDICATIONS  No current facility-administered medications on file prior to encounter.      Current Outpatient Prescriptions on File Prior to Encounter   Medication Sig Dispense Refill   • Folic Acid 0.8 MG Cap Take  by mouth.     • Prenatal Vit-Fe Fumarate-FA (PRENATAL VITAMIN) 27-0.8 MG Tab Take  by mouth.     • Progesterone 100 MG Suppos Insert 1 Suppository in vagina 2 Times  "a Day. 28 Suppository 3       ALLERGIES  No Known Allergies    PHYSICAL EXAM  VITAL SIGNS: /74   Pulse 69   Temp 36.1 °C (96.9 °F)   Resp 14   Ht 1.676 m (5' 6\")   Wt 90 kg (198 lb 6.6 oz)   SpO2 97%   BMI 32.02 kg/m²     Vital signs reviewed.  Constitutional:  Well-appearing and smiling. Conversant  Cardiovascular: Regular rate and rhythm  Pulmonary/Chest: Clear to auscultation bilaterally  Abdominal: Soft, normal bowel sounds, non tender  Psychiatric: Normal affect    LABS  Results for orders placed or performed during the hospital encounter of 04/07/18   BETA-HCG QUANTITATIVE SERUM   Result Value Ref Range    Bhcg 142.5 (H) 0.0 - 5.0 mIU/mL     All labs reviewed by me.    COURSE & MEDICAL DECISION MAKING  Pertinent Labs & Imaging studies reviewed. (See chart for details) The patient's [facility] and past medical records were reviewed    11:07 AM - Patient seen and examined at bedside. Ordered beta-HCG quantitative serum to evaluate her symptoms. The differential diagnoses include but are not limited to: threatened miscarriage, rule out ectopic    11:45 AM Review of laboratory results reveal an HCG of 142.5.     12:09 PM Paged OB GYN.    12:08 PM I discussed the patient's case and the above findings with Dr. Xie (OB GYN) who would like the [facility].    12:22 PM - Re-examined; The patient is resting in bed comfortably. I discussed her above findings and plans for discharge with a referral to the [facility] and instructed to return to the ED if her symptoms worsen. Patient understands and agrees. I suspect the patient's had a miscarriage but I did tell her that she still could have a ectopic pregnancy. In return for any new symptoms.    The patient will return for new or worsening symptoms and is stable at the time of discharge.    DISPOSITION:  Patient will be discharged home in stable condition.    FOLLOW UP:  [facility]  [address] [place] [address] [address]  [place] [id number]  [phone]  Schedule an appointment as soon as possible for a visit in 3 days      FINAL IMPRESSION  1. Miscarriage          ICj (Scribe), am scribing for, and in the presence of, Sloan Castañeda M.D..    Electronically signed by: Cj Navarrete (Abebeibe), 4/7/2018    ISloan M.D. personally performed the services described in this documentation, as scribed by Cj Navarrete in my presence, and it is both accurate and complete.    The note accurately reflects work and decisions made by me.  Sloan Castañeda  4/7/2018  2:11 PM

## 2018-04-07 NOTE — ED TRIAGE NOTES
Pt amb to triage.  Chief Complaint   Patient presents with   • Vaginal Bleeding     seen for the same 2d ago, here for repeat beta HCG quant      Here to see Dr. Castañeda.

## 2018-04-07 NOTE — DISCHARGE INSTRUCTIONS
Miscarriage  A miscarriage is the loss of an unborn baby (fetus) before the 20th week of pregnancy. The cause is often unknown.  Follow these instructions at home:  · You may need to stay in bed (bed rest), or you may be able to do light activity. Go about activity as told by your doctor.  · Have help at home.  · Write down how many pads you use each day. Write down how soaked they are.  · Do not use tampons. Do not wash out your vagina (douche) or have sex (intercourse) until your doctor approves.  · Only take medicine as told by your doctor.  · Do not take aspirin.  · Keep all doctor visits as told.  · If you or your partner have problems with grieving, talk to your doctor. You can also try counseling. Give yourself time to grieve before trying to get pregnant again.  Get help right away if:  · You have bad cramps or pain in your back or belly (abdomen).  · You have a fever.  · You pass large clumps of blood (clots) from your vagina that are walnut-sized or larger. Save the clumps for your doctor to see.  · You pass large amounts of tissue from your vagina. Save the tissue for your doctor to see.  · You have more bleeding.  · You have thick, bad-smelling fluid (discharge) coming from the vagina.  · You get lightheaded, weak, or you pass out (faint).  · You have chills.  This information is not intended to replace advice given to you by your health care provider. Make sure you discuss any questions you have with your health care provider.  Document Released: 03/11/2013 Document Revised: 05/25/2017 Document Reviewed: 01/17/2013  [employer] Interactive Patient Education © 2017 [employer]

## 2018-04-07 NOTE — ED NOTES
Questions answered, given school note and copy lab result for [facility] center.  Verbalizes understanding of dc and f/u instructions.  Released amb to self, out w/ sig other.

## 2018-04-12 ENCOUNTER — GYNECOLOGY VISIT (OUTPATIENT)
Dept: OBGYN | Facility: CLINIC | Age: 23
End: 2018-04-12
Payer: MEDICAID

## 2018-04-12 VITALS — WEIGHT: 203 LBS | DIASTOLIC BLOOD PRESSURE: 60 MMHG | SYSTOLIC BLOOD PRESSURE: 108 MMHG | BODY MASS INDEX: 32.77 KG/M2

## 2018-04-12 DIAGNOSIS — O03.9 SAB (SPONTANEOUS ABORTION): ICD-10-CM

## 2018-04-12 PROCEDURE — 99213 OFFICE O/P EST LOW 20 MIN: CPT | Performed by: OBSTETRICS & GYNECOLOGY

## 2018-04-12 ASSESSMENT — ENCOUNTER SYMPTOMS
GASTROINTESTINAL NEGATIVE: 1
HEARTBURN: 0
FEVER: 0
NAUSEA: 0

## 2018-04-12 NOTE — PROGRESS NOTES
Subjective:      Karlee Munoz is a 23 y.o. female who presents with Follow-Up (ER Follow for SAB )            HPI   23-year-old G 2  was here today for follow-up from the emergency room. Patient was confirmed with a positive urine pregnancy test last week on Wednesday she started having bleeding, Thursday she had a significant amount of bleeding and cramping. And since then her bleeding has become progressively less. She did go to the emergency room and was diagnosed with a probable miscarriage  Patient had beta hCG levels done  Today patient states she is having very scant vaginal bleeding she states that she is feeling well and she has no abdominal pain whatsoever. She is continuing to take progesterone    Review of Systems   Constitutional: Negative for fever.   HENT: Negative for hearing loss.    Cardiovascular: Negative for chest pain.   Gastrointestinal: Negative.  Negative for heartburn and nausea.   Genitourinary: Negative for dysuria and urgency.          Objective:     /60   Wt 92.1 kg (203 lb)   BMI 32.77 kg/m²      Physical Exam   Constitutional: She appears well-developed and well-nourished.   Cardiovascular: Normal rate.    Pulmonary/Chest: Effort normal and breath sounds normal.   Abdominal: Soft. Bowel sounds are normal. She exhibits no distension and no mass. There is no tenderness. There is no rebound and no guarding. No hernia.   Nursing note and vitals reviewed.              Assessment/Plan:     Status post SAB  Beta hCG was declining 181-142  Ultrasound reviewed no evidence of ectopic pregnancy  Discontinue progesterone  Rh+  Continue prenatal vitamins  Discussed with patient she should have. Within 4-6 weeks if no menses please follow up  Otherwise patient can attempt pregnancy following normal menstrual cycle

## 2018-04-13 ENCOUNTER — TELEPHONE (OUTPATIENT)
Dept: OBGYN | Facility: CLINIC | Age: 23
End: 2018-04-13

## 2018-04-13 NOTE — TELEPHONE ENCOUNTER
Pt called stating she saw Dr. Armando yesterday for SAB, pt requesting another Beta HCG quant just to have a piece of mind since she was not ordered one yesterday. I consulted with Dr. Cantor and agreed to order it for pt. I notified pt order in the system, she voiced understanding, will get it done and had no other questions or concerns.

## 2018-04-19 ENCOUNTER — HOSPITAL ENCOUNTER (OUTPATIENT)
Dept: LAB | Facility: MEDICAL CENTER | Age: 23
End: 2018-04-19
Attending: OBSTETRICS & GYNECOLOGY
Payer: MEDICAID

## 2018-04-19 DIAGNOSIS — Z31.69 INFERTILITY COUNSELING: ICD-10-CM

## 2018-04-19 DIAGNOSIS — N93.8 DUB (DYSFUNCTIONAL UTERINE BLEEDING): ICD-10-CM

## 2018-04-19 LAB — B-HCG SERPL-ACNC: 1.3 MIU/ML (ref 0–5)

## 2018-04-19 PROCEDURE — 36415 COLL VENOUS BLD VENIPUNCTURE: CPT

## 2018-04-19 PROCEDURE — 84702 CHORIONIC GONADOTROPIN TEST: CPT

## 2018-11-02 ENCOUNTER — GYNECOLOGY VISIT (OUTPATIENT)
Dept: OBGYN | Facility: CLINIC | Age: 23
End: 2018-11-02
Payer: MEDICAID

## 2018-11-02 ENCOUNTER — HOSPITAL ENCOUNTER (OUTPATIENT)
Facility: MEDICAL CENTER | Age: 23
End: 2018-11-02
Attending: OBSTETRICS & GYNECOLOGY
Payer: MEDICAID

## 2018-11-02 VITALS — WEIGHT: 202 LBS | SYSTOLIC BLOOD PRESSURE: 120 MMHG | DIASTOLIC BLOOD PRESSURE: 70 MMHG | BODY MASS INDEX: 32.6 KG/M2

## 2018-11-02 DIAGNOSIS — N89.8 VAGINAL DISCHARGE: ICD-10-CM

## 2018-11-02 PROCEDURE — 87491 CHLMYD TRACH DNA AMP PROBE: CPT

## 2018-11-02 PROCEDURE — 87660 TRICHOMONAS VAGIN DIR PROBE: CPT

## 2018-11-02 PROCEDURE — 99212 OFFICE O/P EST SF 10 MIN: CPT | Performed by: OBSTETRICS & GYNECOLOGY

## 2018-11-02 PROCEDURE — 87510 GARDNER VAG DNA DIR PROBE: CPT

## 2018-11-02 PROCEDURE — 87480 CANDIDA DNA DIR PROBE: CPT

## 2018-11-02 PROCEDURE — 87591 N.GONORRHOEAE DNA AMP PROB: CPT

## 2018-11-02 NOTE — NON-PROVIDER
"Pt here for GYN exam. States having pain with intercourse.    Pt states feels like its \"burning\" during intercourse. Only feels it during that time. Also states she has a bad odor coming from her vagina, and clear discharge.  Good# [phone]  LMP: 10/13/18  "

## 2018-11-02 NOTE — PROGRESS NOTES
Chief Complaint   Patient presents with   • Gynecologic Exam   CC: vaginal discharge, odour      History of present illness: 23 y.o. presents to office for evaluation of vaginal discharge and odour x 1 month.  Also +pain with IC.  No fevers      Review of systems:  Pertinent positives documented in HPI and a comprehensive review of system is negative    All PMH, PSH, allergies, social history and FH reviewed and updated today:  Past Medical History:   Diagnosis Date   • Anemia 2015   • Blood transfusion without reported diagnosis    • Ectopic pregnancy        Past Surgical History:   Procedure Laterality Date   • EYE SURGERY  06/2016    bilateral   • EYE SURGERY  2015       Allergies: No Known Allergies    Social History     Social History   • Marital status: Single     Spouse name: N/A   • Number of children: N/A   • Years of education: N/A     Occupational History   • Not on file.     Social History Main Topics   • Smoking status: Never Smoker   • Smokeless tobacco: Never Used   • Alcohol use No   • Drug use: No   • Sexual activity: Yes     Partners: Male     Other Topics Concern   • Not on file     Social History Narrative   • No narrative on file       Family History   Problem Relation Age of Onset   • Anemia Mother    • Diabetes Maternal Grandmother        Physical exam:  Blood pressure 120/70, weight 91.6 kg (202 lb), last menstrual period 10/13/2018, unknown if currently breastfeeding.    GENERAL APPEARANCE: healthy, alert, no distress  NECK nontender, no masses, thyromegaly or nodules  ABDOMEN Abdomen soft, non-tender. BS normal. No masses,  No organomegaly  FEMALE GYN: normal female external genitalia without lesions, BUS normal, clear/white vaginal discharge noted, vulva pink without erythema or friability, urethra is normal without discharge or scarring, no bladder fullness or masses, normal external genitalia, no erythema, no discharge, normal anus and perineum.  NEURO Awake, alert and oriented x 3,  Normal gait, no sensory deficits  SKIN No rashes, or ulcers or lesions seen  PSYCHIATRIC: Patient shows appropriate affect, is alert and oriented x3, intact judgment and insight.      Assessment:  1. Vaginal discharge         Plan:  Likely BV, affirm and GC/Ct obtained    Pt also TTC x 7 months, no success.  Will schedule ophelia to discuss fertility    Questions answered    Spent  10 minutes in face-to-face patient contact in which greater than 50% of that visit was spent in counseling/coordination of care including medical and surgical options of care.

## 2018-11-03 DIAGNOSIS — N89.8 VAGINAL DISCHARGE: ICD-10-CM

## 2018-11-03 LAB
CANDIDA DNA VAG QL PROBE+SIG AMP: NEGATIVE
G VAGINALIS DNA VAG QL PROBE+SIG AMP: POSITIVE
T VAGINALIS DNA VAG QL PROBE+SIG AMP: NEGATIVE

## 2018-11-05 RX ORDER — METRONIDAZOLE 500 MG/1
500 TABLET ORAL 2 TIMES DAILY
Qty: 14 TAB | Refills: 0 | Status: SHIPPED | OUTPATIENT
Start: 2018-11-05 | End: 2018-11-12

## 2018-11-07 ENCOUNTER — TELEPHONE (OUTPATIENT)
Dept: OBGYN | Facility: CLINIC | Age: 23
End: 2018-11-07

## 2018-11-07 NOTE — TELEPHONE ENCOUNTER
Left Msg to return call for lab results      Please let pt know we saw bacterial vaginosis on her baby smear, she needs anbx for sevens days, sent to pharm. No soaps, lotions, creams on vagina/labia only warm water to clean

## 2018-11-08 NOTE — TELEPHONE ENCOUNTER
Pt called back and notified of BV results, instructions  given and explained to patient. Pt voiced understanding and will comply. Pt had no other questions or concerns

## 2018-11-13 ENCOUNTER — TELEPHONE (OUTPATIENT)
Dept: OBGYN | Facility: CLINIC | Age: 23
End: 2018-11-13

## 2018-11-13 NOTE — TELEPHONE ENCOUNTER
----- Message from MENA Rios sent at 11/5/2018  8:49 AM PST -----  Please let pt know we saw bacterial vaginosis on her baby smear, she needs anbx for sevens days, sent to pharm. No soaps, lotions, creams on vagina/labia only warm water to clean.

## 2018-12-07 ENCOUNTER — HOSPITAL ENCOUNTER (OUTPATIENT)
Dept: LAB | Facility: MEDICAL CENTER | Age: 23
End: 2018-12-07
Attending: OBSTETRICS & GYNECOLOGY
Payer: MEDICAID

## 2018-12-07 ENCOUNTER — INITIAL PRENATAL (OUTPATIENT)
Dept: OBGYN | Facility: CLINIC | Age: 23
End: 2018-12-07
Payer: MEDICAID

## 2018-12-07 VITALS
SYSTOLIC BLOOD PRESSURE: 126 MMHG | BODY MASS INDEX: 32.78 KG/M2 | DIASTOLIC BLOOD PRESSURE: 82 MMHG | HEIGHT: 66 IN | WEIGHT: 204 LBS

## 2018-12-07 DIAGNOSIS — Z32.01 PREGNANCY EXAMINATION OR TEST, POSITIVE RESULT: ICD-10-CM

## 2018-12-07 LAB
B-HCG SERPL-ACNC: 3801.6 MIU/ML (ref 0–5)
INT CON NEG: NEGATIVE
INT CON POS: POSITIVE
POC URINE PREGNANCY TEST: POSITIVE

## 2018-12-07 PROCEDURE — 84702 CHORIONIC GONADOTROPIN TEST: CPT

## 2018-12-07 PROCEDURE — 36415 COLL VENOUS BLD VENIPUNCTURE: CPT

## 2018-12-07 PROCEDURE — 81025 URINE PREGNANCY TEST: CPT | Performed by: OBSTETRICS & GYNECOLOGY

## 2018-12-07 PROCEDURE — 99214 OFFICE O/P EST MOD 30 MIN: CPT | Performed by: OBSTETRICS & GYNECOLOGY

## 2018-12-07 NOTE — PROGRESS NOTES
Pt here today for   LMP: 10/13/2018   + UPT today done in [facility]  WT: 204 lb  BP: 126/82  Pt states having a little nausea and cramps sometimes. States no other concerns.   Good # [phone]

## 2018-12-08 NOTE — PROGRESS NOTES
"Cc: Confirmation of pregnancy    HPI:  The patient is a 23 y.o.  7w6d based upon  Patient's last menstrual period was 10/13/2018.. She was using no birth control method. This was a planned pregnancy.    H/O ectopic pregnancy in the past (S/P MTX tx), h/o SAB    She presents for a confirmation of pregnancy.  She denies  fetal movement,  denies  vaginal bleeding,  denies  leakage of fluid,  denies contractions.   She reports nausea/vomiting, denies headache, and denies dysuria.      Review of systems:  Pertinent positives documented in HPI and all other systems reviewed & are negative    OB History    Para Term  AB Living   3       1     SAB TAB Ectopic Molar Multiple Live Births   1                # Outcome Date GA Lbr Isidro/2nd Weight Sex Delivery Anes PTL Lv   3 Current            2 SAB 2018 5w0d    SAB         Birth Comments: passed on its own   1  17     ECTOPIC         Birth Comments: methotrexate treatment        Past Medical History:   Diagnosis Date   • Anemia    • Ectopic pregnancy      Past Surgical History:   Procedure Laterality Date   • EYE SURGERY  2016    bilateral   • EYE SURGERY       Social History     Social History   • Marital status: Single     Spouse name: N/A   • Number of children: N/A   • Years of education: N/A     Occupational History   • Not on file.     Social History Main Topics   • Smoking status: Never Smoker   • Smokeless tobacco: Never Used   • Alcohol use No   • Drug use: No   • Sexual activity: Yes     Partners: Male      Comment: Planned pregnancy.      Other Topics Concern   • Not on file     Social History Narrative   • No narrative on file     History reviewed. No pertinent family history.  Allergies:   Allergies as of 2018   • (No Known Allergies)       PE:    Blood pressure 126/82, height 1.676 m (5' 6\"), weight 92.5 kg (204 lb), last menstrual period 10/13/2018, unknown if currently breastfeeding.      General:appears " stated age, is in no apparent distress  Head: normocephalic, non-tender  Neck: neck is supple, no anterior cervical adenopathy, no posterior cervical adenopathy, no supraclavicular adenopathy  Abdomen: Bowel sounds positive, nondistended, soft, nontender x4, no rebound or guarding. No organomegaly. No masses.  Female GYN: normal female external genitalia without lesionsnormal external genitalia, no erythema, no discharge  Skin: No rashes, or ulcers or lesions seen  Psychiatric: Patient shows appropriate affect, is alert and oriented x3, intact judgment and insight.    TVUS performed and per my read:    Indication: Dating.     Findings: Positive GS in fundus, no FP seen. Right ovary no masses. Left Ovary no masses. No free fluid in the cul-de-sac.    Impression: Pregnancy of unknown viability      A/P:   1. Pregnancy examination or test, positive result  POCT Pregnancy    HCG QUANTITATIVE    HCG QUANTITATIVE       1. Spent 25 minutes in face-to-face patient contact in which greater than 50% of that visit was spent in counseling/coordination of care of newly diagnosed pregnancy with unknown viability/location  2.  Plan for hcg today, with repeat on Monday  3.  Precautions d/w pt  4.  Aware this may be early IUP vs ectopic vs SAB

## 2018-12-10 ENCOUNTER — HOSPITAL ENCOUNTER (OUTPATIENT)
Dept: LAB | Facility: MEDICAL CENTER | Age: 23
End: 2018-12-10
Attending: OBSTETRICS & GYNECOLOGY
Payer: MEDICAID

## 2018-12-10 ENCOUNTER — TELEPHONE (OUTPATIENT)
Dept: OBGYN | Facility: CLINIC | Age: 23
End: 2018-12-10

## 2018-12-10 DIAGNOSIS — Z32.01 PREGNANCY EXAMINATION OR TEST, POSITIVE RESULT: ICD-10-CM

## 2018-12-10 PROCEDURE — 84702 CHORIONIC GONADOTROPIN TEST: CPT

## 2018-12-10 PROCEDURE — 36415 COLL VENOUS BLD VENIPUNCTURE: CPT

## 2018-12-10 NOTE — TELEPHONE ENCOUNTER
Patient was here in office at 13:57 stating Dr Trujillo had left her a voicemail telling her, her HCG quant was high enough to be able to see if the pregnancy was in the uterus. I personally listen to the voicemail. Patient is worried and would like the US done today as indicated by MD on his voicemail. .   I talk to Dr Bryan (MD for today) and unfortunately her schedule is very booked for today. MD offered the patient to stay and if she has a no show she would see her.  I also called Dr. Trujillo and per doctor we need to get her seen as soon as possible for an US due to her hx of Etopic pregnancies. He also asked patient get her second HCG quant today.  Spoke with patient, I let her know what Dr Palencia had to offer for her, but she stated she had no pain and rather schedule an appointment. Patient's  was very upset, although I did tell him I did understand his concern, but if he would of came in earlier we could of tried to make something work. Both patient and  left understanding and were told if she has any abdominal pain and or bleeding she is to go to the ER right away. Her appointment was scheduled for 12/18/18 at 14:15, which was the next available appointment. Patient was also asked to go to the lab to get her blood work drawn.   Patient verbalized understanding and had no further questions and or concerns. She stated she will be going to the lab today.

## 2018-12-11 LAB — B-HCG SERPL-ACNC: 9191.3 MIU/ML (ref 0–5)

## 2018-12-18 ENCOUNTER — INITIAL PRENATAL (OUTPATIENT)
Dept: OBGYN | Facility: CLINIC | Age: 23
End: 2018-12-18
Payer: MEDICAID

## 2018-12-18 VITALS — DIASTOLIC BLOOD PRESSURE: 78 MMHG | WEIGHT: 204 LBS | SYSTOLIC BLOOD PRESSURE: 118 MMHG | BODY MASS INDEX: 32.93 KG/M2

## 2018-12-18 DIAGNOSIS — Z32.01 ENCOUNTER FOR PREGNANCY TEST, RESULT POSITIVE: ICD-10-CM

## 2018-12-18 DIAGNOSIS — N92.6 MISSED MENSES: ICD-10-CM

## 2018-12-18 PROCEDURE — 99213 OFFICE O/P EST LOW 20 MIN: CPT | Mod: 25 | Performed by: OBSTETRICS & GYNECOLOGY

## 2018-12-18 PROCEDURE — 76830 TRANSVAGINAL US NON-OB: CPT | Performed by: OBSTETRICS & GYNECOLOGY

## 2018-12-18 NOTE — PROGRESS NOTES
Cc: Confirmation of pregnancy    HPI:  The patient is a 23 y.o.  9w3d based upon  Patient's last menstrual period was 10/13/2018.. Patient was seen a week ago and at the time of the scan only a uterine GS was seen wtihout a fetal pole. Serial beta were done: 3801.6 () and 9191.3 (12/10)    She presents for a confirmation of pregnancy.  She denies  fetal movement,  denies  vaginal bleeding,  denies  leakage of fluid,  denies contractions.   She reports nausea/vomiting, denies headache, and denies dysuria.      Review of systems:  Pertinent positives documented in HPI and all other systems reviewed & are negative    Gyn History: LMP1. regular q 28-30 days    Pregnancy related complications:    OB History    Para Term  AB Living   3       1     SAB TAB Ectopic Molar Multiple Live Births   1                # Outcome Date GA Lbr Isidro/2nd Weight Sex Delivery Anes PTL Lv   3 Current            2 SAB 2018 5w0d    SAB         Birth Comments: passed on its own   1  17     ECTOPIC         Birth Comments: methotrexate treatment        Past Medical History:   Diagnosis Date   • Anemia    • Ectopic pregnancy      Past Surgical History:   Procedure Laterality Date   • EYE SURGERY  2016    bilateral   • EYE SURGERY       Social History     Social History   • Marital status: Single     Spouse name: N/A   • Number of children: N/A   • Years of education: N/A     Occupational History   • Not on file.     Social History Main Topics   • Smoking status: Never Smoker   • Smokeless tobacco: Never Used   • Alcohol use No   • Drug use: No   • Sexual activity: Yes     Partners: Male      Comment: Planned pregnancy.      Other Topics Concern   • Not on file     Social History Narrative   • No narrative on file     No family history on file.  Allergies:   Allergies as of 2018   • (No Known Allergies)       PE:    Blood pressure 118/78, weight 92.5 kg (204 lb), last menstrual period  10/13/2018, unknown if currently breastfeeding.    General:appears stated age, is in no apparent distress, is well developed and well nourished  Head: normocephalic, non-tender  Neck: neck is supple, no jugular venous distension, no thyromegaly  Abdomen: Bowel sounds positive, nondistended, soft, nontender x4, no rebound or guarding. No organomegaly. No masses.  Female GYN: normal female external genitalia without lesions   exam deferred  Skin: No rashes, or ulcers or lesions seen  Psychiatric: Patient shows appropriate affect, is alert and oriented x3, intact judgment and insight.    transvaginal scan and per my read:    Indication: amenorrhea. Normal    Findings: harvey intrauterine pregnancy @ 6w4d weeks by CRL of 0.69cm. Positive yolk sac. Positive fetal cardiac activity @ 126 BPM. Right ovary no masses. Left Ovary no masses. Cervical length 3.79cm. No free fluid in the cul-de-sac.    Impression: viable IUP @ 6-4 weeks. EDC by US of 8/9/19    DATING: As the gestational age and LMP differ by more than 5 days will change due date to 8/9/2019    A/P:   1. Encounter for pregnancy test, result positive     2. Missed menses         Spent 20 minutes in face-to-face patient contact in which greater than 50% of that visit was spent in counseling/coordination of care of newly diagnosed pregnancy including medical and surgical options of care.  Second trimester screening for Down Syndrome and neural tube defects discussed.  Patient will think about carrier screening (CF, SMA, Hg elect.)  SAB precautions discussed  F/u in 4 weeks for new OB visit  Increase water intake and encouraged healthy nutrition.  Begin prenatal vitamins.

## 2018-12-18 NOTE — LETTER
December 18, 2018            Karlee Munoz is pregnant with an estimated delivery date of 8/9/2019 at this time.        Thank you,          Pillo Price D.O.    Electronically Signed

## 2019-01-17 ENCOUNTER — HOSPITAL ENCOUNTER (OUTPATIENT)
Dept: LAB | Facility: MEDICAL CENTER | Age: 24
End: 2019-01-17
Attending: NURSE PRACTITIONER
Payer: MEDICAID

## 2019-01-17 ENCOUNTER — HOSPITAL ENCOUNTER (OUTPATIENT)
Facility: MEDICAL CENTER | Age: 24
End: 2019-01-17
Attending: NURSE PRACTITIONER
Payer: MEDICAID

## 2019-01-17 ENCOUNTER — INITIAL PRENATAL (OUTPATIENT)
Dept: OBGYN | Facility: CLINIC | Age: 24
End: 2019-01-17
Payer: MEDICAID

## 2019-01-17 VITALS — BODY MASS INDEX: 32.44 KG/M2 | SYSTOLIC BLOOD PRESSURE: 102 MMHG | WEIGHT: 201 LBS | DIASTOLIC BLOOD PRESSURE: 58 MMHG

## 2019-01-17 DIAGNOSIS — Z34.80 SUPERVISION OF OTHER NORMAL PREGNANCY, ANTEPARTUM: Primary | ICD-10-CM

## 2019-01-17 DIAGNOSIS — Z34.80 SUPERVISION OF OTHER NORMAL PREGNANCY, ANTEPARTUM: ICD-10-CM

## 2019-01-17 PROBLEM — N92.6 MISSED MENSES: Status: RESOLVED | Noted: 2018-12-18 | Resolved: 2019-01-17

## 2019-01-17 PROBLEM — Z32.01 ENCOUNTER FOR PREGNANCY TEST, RESULT POSITIVE: Status: RESOLVED | Noted: 2018-12-18 | Resolved: 2019-01-17

## 2019-01-17 PROBLEM — N89.8 VAGINAL DISCHARGE: Status: RESOLVED | Noted: 2018-11-02 | Resolved: 2019-01-17

## 2019-01-17 LAB
ABO GROUP BLD: NORMAL
APPEARANCE UR: CLEAR
APPEARANCE UR: NORMAL
BASOPHILS # BLD AUTO: 0.4 % (ref 0–1.8)
BASOPHILS # BLD: 0.04 K/UL (ref 0–0.12)
BILIRUB UR QL STRIP.AUTO: NEGATIVE
BILIRUB UR STRIP-MCNC: NORMAL MG/DL
BLD GP AB SCN SERPL QL: NORMAL
COLOR UR AUTO: NORMAL
COLOR UR: YELLOW
EOSINOPHIL # BLD AUTO: 0.06 K/UL (ref 0–0.51)
EOSINOPHIL NFR BLD: 0.6 % (ref 0–6.9)
ERYTHROCYTE [DISTWIDTH] IN BLOOD BY AUTOMATED COUNT: 41.1 FL (ref 35.9–50)
GLUCOSE UR STRIP.AUTO-MCNC: NEGATIVE MG/DL
GLUCOSE UR STRIP.AUTO-MCNC: NEGATIVE MG/DL
HBV SURFACE AG SER QL: NEGATIVE
HCT VFR BLD AUTO: 42.7 % (ref 37–47)
HGB BLD-MCNC: 14.7 G/DL (ref 12–16)
HIV 1+2 AB+HIV1 P24 AG SERPL QL IA: NON REACTIVE
IMM GRANULOCYTES # BLD AUTO: 0.03 K/UL (ref 0–0.11)
IMM GRANULOCYTES NFR BLD AUTO: 0.3 % (ref 0–0.9)
KETONES UR STRIP.AUTO-MCNC: NEGATIVE MG/DL
KETONES UR STRIP.AUTO-MCNC: NEGATIVE MG/DL
LEUKOCYTE ESTERASE UR QL STRIP.AUTO: NEGATIVE
LEUKOCYTE ESTERASE UR QL STRIP.AUTO: NORMAL
LYMPHOCYTES # BLD AUTO: 1.6 K/UL (ref 1–4.8)
LYMPHOCYTES NFR BLD: 17.2 % (ref 22–41)
MCH RBC QN AUTO: 30.1 PG (ref 27–33)
MCHC RBC AUTO-ENTMCNC: 34.4 G/DL (ref 33.6–35)
MCV RBC AUTO: 87.5 FL (ref 81.4–97.8)
MICRO URNS: ABNORMAL
MONOCYTES # BLD AUTO: 0.72 K/UL (ref 0–0.85)
MONOCYTES NFR BLD AUTO: 7.8 % (ref 0–13.4)
NEUTROPHILS # BLD AUTO: 6.84 K/UL (ref 2–7.15)
NEUTROPHILS NFR BLD: 73.7 % (ref 44–72)
NITRITE UR QL STRIP.AUTO: NEGATIVE
NITRITE UR QL STRIP.AUTO: NEGATIVE
NRBC # BLD AUTO: 0 K/UL
NRBC BLD-RTO: 0 /100 WBC
PH UR STRIP.AUTO: 6 [PH] (ref 5–8)
PH UR STRIP.AUTO: 6.5 [PH]
PLATELET # BLD AUTO: 343 K/UL (ref 164–446)
PMV BLD AUTO: 9.5 FL (ref 9–12.9)
PROT UR QL STRIP: NEGATIVE MG/DL
PROT UR QL STRIP: NEGATIVE MG/DL
RBC # BLD AUTO: 4.88 M/UL (ref 4.2–5.4)
RBC UR QL AUTO: NEGATIVE
RBC UR QL AUTO: NORMAL
RH BLD: NORMAL
RUBV AB SER QL: 182.6 IU/ML
SP GR UR STRIP.AUTO: 1.01
SP GR UR STRIP.AUTO: 1.02
TREPONEMA PALLIDUM IGG+IGM AB [PRESENCE] IN SERUM OR PLASMA BY IMMUNOASSAY: NON REACTIVE
UROBILINOGEN UR STRIP-MCNC: NORMAL MG/DL
UROBILINOGEN UR STRIP.AUTO-MCNC: 0.2 MG/DL
WBC # BLD AUTO: 9.3 K/UL (ref 4.8–10.8)

## 2019-01-17 PROCEDURE — 85025 COMPLETE CBC W/AUTO DIFF WBC: CPT

## 2019-01-17 PROCEDURE — 86762 RUBELLA ANTIBODY: CPT

## 2019-01-17 PROCEDURE — 86900 BLOOD TYPING SEROLOGIC ABO: CPT

## 2019-01-17 PROCEDURE — 87591 N.GONORRHOEAE DNA AMP PROB: CPT

## 2019-01-17 PROCEDURE — 86901 BLOOD TYPING SEROLOGIC RH(D): CPT

## 2019-01-17 PROCEDURE — 88175 CYTOPATH C/V AUTO FLUID REDO: CPT

## 2019-01-17 PROCEDURE — 86780 TREPONEMA PALLIDUM: CPT

## 2019-01-17 PROCEDURE — 81003 URINALYSIS AUTO W/O SCOPE: CPT

## 2019-01-17 PROCEDURE — 86850 RBC ANTIBODY SCREEN: CPT

## 2019-01-17 PROCEDURE — 87340 HEPATITIS B SURFACE AG IA: CPT

## 2019-01-17 PROCEDURE — 36415 COLL VENOUS BLD VENIPUNCTURE: CPT

## 2019-01-17 PROCEDURE — 87491 CHLMYD TRACH DNA AMP PROBE: CPT

## 2019-01-17 PROCEDURE — 59401 PR NEW OB VISIT: CPT | Performed by: NURSE PRACTITIONER

## 2019-01-17 PROCEDURE — 87389 HIV-1 AG W/HIV-1&-2 AB AG IA: CPT

## 2019-01-17 PROCEDURE — 81002 URINALYSIS NONAUTO W/O SCOPE: CPT | Performed by: NURSE PRACTITIONER

## 2019-01-17 NOTE — PROGRESS NOTES
S:  Karlee Munoz is a 23 y.o.  who presents for her new OB exam.  She is 10w6d with and DIMA of Estimated Date of Delivery: 19 by  in this clinic. She has no complaints except for some occasional stomach pain which she feels is relieved by eating something..  She is currently not working outside the home. No  heavy lifting or chemical exposure. No ER visits or previous care in this pregnancy.     declines AFP.  declines CF.  Denies VB, LOF, or cramping.  Denies dysuria, vaginal DC. Reports not feeling fetal movement.     Pt is  and lives with FOB.  Pregnancy is desired.      Past Medical History:   Diagnosis Date   • Anemia    • Ectopic pregnancy      History reviewed. No pertinent family history.  Social History     Social History   • Marital status: Single     Spouse name: N/A   • Number of children: N/A   • Years of education: N/A     Occupational History   • Not on file.     Social History Main Topics   • Smoking status: Never Smoker   • Smokeless tobacco: Never Used   • Alcohol use No   • Drug use: No   • Sexual activity: Yes     Partners: Male      Comment: Planned pregnancy.      Other Topics Concern   • Not on file     Social History Narrative   • No narrative on file     OB History    Para Term  AB Living   3       2     SAB TAB Ectopic Molar Multiple Live Births   2                # Outcome Date GA Lbr Isidro/2nd Weight Sex Delivery Anes PTL Lv   3 Current            2 2018 5w0d    SAB         Birth Comments: passed on its own   1 17     ECTOPIC         Birth Comments: methotrexate treatment            History of HSV I or II in self or partner: no  History of Thyroid problems: no    O:    Vitals:    19 1032   BP: 102/58   Weight: 91.2 kg (201 lb)      See Prenatal Physical flow sheet for this visit.    Wet mount: none      A:   1.  IUP @ 10w6d per         2.  S=D        3.  See problem list below              Patient Active Problem List     Diagnosis Date Noted   • Supervision of other normal pregnancy 01/17/2019   • Missed menses 12/18/2018   • Tubal pregnancy without intrauterine pregnancy- s/p MTX (1/13/2017) 01/18/2017         P:  1.  GC/CT & pap done        2.  Prenatal labs ordered - lab slip given declines AFP        3.  Discussed PNV, diet, avoidances and adequate water intake        4.  NOB packet given        5.  Return to office in 4 wks        6.  Complete OB US in 8 wks        7.  Had flu shot at [facility].         8.  Centering pregnancy offered.     No orders of the defined types were placed in this encounter.

## 2019-01-17 NOTE — LETTER
Cystic Fibrosis Carrier Testing  Karlee Munoz    The following information is about a blood test that can be done to determine if you and/or your partner carry the gene for cystic fibrosis.    WHAT IS CYSTIC FIBROSIS?  · Cystic fibrosis (CF) is an inherited disease that affects more than 25,000 American children and young adults.  · Symptoms of CF vary but include lung congestion, pneumonia, diarrhea and poor growth.  Most people with CF have severe medical problems and some die at a young age.  Others have so few symptoms they are unaware they have CF.  · CF does not affect intelligence.  · Although there is no cure for CF at this time, scientists are making progress in improving treatment and in searching for a cure.  In the past many people with CF  at a very young age.  Today, many are living into their 20’s and 30’s.    IS THERE A CHANCE MY BABY COULD HAVE CYSTIC FIBROSIS?  · You can have a child with CF even if there is no history in your family (see chart below).  · CF testing can help determine if you are a carrier and at risk to have a child with CF.  Note: if both parents are carriers, there is a 1 in 4 (25%) chance with each pregnancy that they will have a child with CF.  · Carriers have one normal CF gene and one altered CF gene.  · People with CF have two altered CF genes.  · Most people have two normal copies of the CF gene.    Approximate risk that a couple with no family history of cystic fibrosis will have a child with cystic fibrosis:    Ethnic background / Risk     couple:  1 in 2,500   couple:  1 in 15,000            couple:  1 in 8,000     American couple:  1 in 32,000     WHAT TESTING IS AVAILABLE?  · There is a blood test that can be done to find out if you or your partner is a carrier.  · It is important to understand that CF carrier testing does not detect all CF carriers.  · If the test shows that you are both CF carriers, you unborn baby can  be tested to find out if the baby has CF.    HOW MUCH DOES IT COST TO HAVE CYSTIC FIBROSIS CARRIER TESTING?  · Cost and insurance coverage for CF carrier testing vary depending upon the laboratory used and your insurance policy.  · The average cost for CF carrier testing is $300 per person.  · Your genetic counselor can provide you with more information about cystic fibrosis carrier testing.    _____  Yes, I am interested in discussing carrier testing with a genetic counselor.    _____  No, I am not interested in CF carrier testing or in receiving more information about CF carrier testing.      Client signature: ________________________________________  1/17/2019

## 2019-01-18 DIAGNOSIS — Z34.80 SUPERVISION OF OTHER NORMAL PREGNANCY, ANTEPARTUM: ICD-10-CM

## 2019-01-18 LAB
C TRACH DNA GENITAL QL NAA+PROBE: NEGATIVE
CYTOLOGY REG CYTOL: NORMAL
N GONORRHOEA DNA GENITAL QL NAA+PROBE: NEGATIVE
SPECIMEN SOURCE: NORMAL

## 2019-02-11 ENCOUNTER — ROUTINE PRENATAL (OUTPATIENT)
Dept: OBGYN | Facility: CLINIC | Age: 24
End: 2019-02-11
Payer: MEDICAID

## 2019-02-11 VITALS — DIASTOLIC BLOOD PRESSURE: 70 MMHG | BODY MASS INDEX: 32.77 KG/M2 | SYSTOLIC BLOOD PRESSURE: 118 MMHG | WEIGHT: 203 LBS

## 2019-02-11 DIAGNOSIS — Z34.80 SUPERVISION OF OTHER NORMAL PREGNANCY, ANTEPARTUM: Primary | ICD-10-CM

## 2019-02-11 PROCEDURE — 90040 PR PRENATAL FOLLOW UP: CPT | Performed by: PHYSICIAN ASSISTANT

## 2019-02-11 NOTE — PROGRESS NOTES
Pt has no complaints with cramping, bleeding or pain, though pt has occ pain in upper abd after eating that can last all day. Pt admits she eats very fast, so possibly gas pain. Pt denies N/V/D associated with pain. Pt to keep food diary, cut back on high fat foods, and eat slower, more small meals. Call if not improving. No radiation to chest or sides, unlikely heartburn or gallbladder. No Fm yet. PNL, PAP, GC/CT wnl - pt notified of results. Declines AFP, but will do if abn US. US to be done 3/28/19. Pt has f/u appt 3/5 for centering, but wants to switch to [place] group if possible. Jodi to talk to pt today. RTC 4 wk or sooner prn.

## 2019-02-11 NOTE — PROGRESS NOTES
OB f/u.  No VB, LOF or UC's.  Wt:  203lb    BP: 118/70  Good phone # [phone]  Preferred pharmacy confirmed.  PNP done  U/S schedule on 3/28/19   C/o: upper abdominal pain, heart burn  AFP declined before

## 2019-03-04 NOTE — PROGRESS NOTES
S:  Pt is  at 17w4d DIMA: Estimated Date of Delivery: 19  per  at 6 weeks here for Centering session #1/#2.  Reports no issues.  Reports FM.  Denies VB, LOF, RUCs, or vaginal DC.     O:  Please see above vitals..        FHTs: 145        Fundal ht: 17    Complete OB US  Scheduled for 3/28    A:  IUP 17w3d  Patient Active Problem List    Diagnosis Date Noted   • Supervision of other normal pregnancy 2019   • Tubal pregnancy without intrauterine pregnancy- s/p MTX (2017) 2017        P:  1.  Reviewed labs, AFP declined.         2.  Questions answered.          3.  F/u 4wks Centering session #3.         4. Dental referral        5. S/p flu vacc    Centering Topics Covered:   1.  Nutrition  2.  Serving sizes  3.  Food diary  4.  Taking care of your back  5.  Healthy gums and teeth  6.  Prenatal testing, genetic screening  7.  Common discomforts of pregnancy  8.  Body changes in pregnancy  9.  Healthy lifestyle choices

## 2019-03-05 ENCOUNTER — ROUTINE PRENATAL (OUTPATIENT)
Dept: OBGYN | Facility: CLINIC | Age: 24
End: 2019-03-05
Payer: MEDICAID

## 2019-03-05 VITALS — BODY MASS INDEX: 33.09 KG/M2 | SYSTOLIC BLOOD PRESSURE: 114 MMHG | WEIGHT: 205 LBS | DIASTOLIC BLOOD PRESSURE: 74 MMHG

## 2019-03-05 DIAGNOSIS — Z34.80 SUPERVISION OF OTHER NORMAL PREGNANCY, ANTEPARTUM: ICD-10-CM

## 2019-03-05 PROCEDURE — 90040 PR PRENATAL FOLLOW UP: CPT | Performed by: NURSE PRACTITIONER

## 2019-03-28 ENCOUNTER — APPOINTMENT (OUTPATIENT)
Dept: RADIOLOGY | Facility: IMAGING CENTER | Age: 24
End: 2019-03-28
Attending: NURSE PRACTITIONER
Payer: MEDICAID

## 2019-03-28 ENCOUNTER — DOCUMENTATION (OUTPATIENT)
Dept: OBGYN | Facility: CLINIC | Age: 24
End: 2019-03-28

## 2019-03-28 DIAGNOSIS — Z34.80 SUPERVISION OF OTHER NORMAL PREGNANCY, ANTEPARTUM: ICD-10-CM

## 2019-03-28 PROCEDURE — 76805 OB US >/= 14 WKS SNGL FETUS: CPT | Performed by: OBSTETRICS & GYNECOLOGY

## 2019-03-28 NOTE — PROGRESS NOTES
Single intrauterine pregnancy of an estimated gestational age of 20 weeks, 6 days with an estimated date of delivery of 08/09/2019. Consistent with working DIMA.     Fetal survey is within normal limits.    Incidental note of circumvallate placenta.

## 2019-04-02 ENCOUNTER — ROUTINE PRENATAL (OUTPATIENT)
Dept: OBGYN | Facility: CLINIC | Age: 24
End: 2019-04-02
Payer: MEDICAID

## 2019-04-02 VITALS — WEIGHT: 204.4 LBS | BODY MASS INDEX: 32.99 KG/M2 | DIASTOLIC BLOOD PRESSURE: 64 MMHG | SYSTOLIC BLOOD PRESSURE: 120 MMHG

## 2019-04-02 DIAGNOSIS — Z34.80 SUPERVISION OF OTHER NORMAL PREGNANCY, ANTEPARTUM: ICD-10-CM

## 2019-04-02 PROCEDURE — 90040 PR PRENATAL FOLLOW UP: CPT | Performed by: PHYSICIAN ASSISTANT

## 2019-04-02 NOTE — PROGRESS NOTES
S:  Pt is  at 21w4d DIMA: Estimated Date of Delivery: 19 here for Centering session #3.  Pt has no complaints.  Reports + FM.  Denies VB, RUCs, LOF or vaginal DC.    O:  Please see above vitals.        FHTs: 150        Fundal ht: 22    A:  IUP at 21w4d  Patient Active Problem List    Diagnosis Date Noted   • Supervision of other normal pregnancy 2019   • Tubal pregnancy without intrauterine pregnancy- s/p MTX (2017) 2017       P:  1.   Reviewed labs/US w pt - US wnl -pt notified of results        2.   Questions answered.          3.   F/u 4wks Centering session #4    Centering Topics Reviewed:   1.  Mental relaxation  2.  Breastfeeding  3.  The Family I want to have  4.  Other topics: common discomforts - HA.

## 2019-04-30 ENCOUNTER — ROUTINE PRENATAL (OUTPATIENT)
Dept: OBGYN | Facility: CLINIC | Age: 24
End: 2019-04-30
Payer: MEDICAID

## 2019-04-30 VITALS — SYSTOLIC BLOOD PRESSURE: 125 MMHG | BODY MASS INDEX: 33.86 KG/M2 | WEIGHT: 209.8 LBS | DIASTOLIC BLOOD PRESSURE: 73 MMHG

## 2019-04-30 DIAGNOSIS — Z3A.25 25 WEEKS GESTATION OF PREGNANCY: ICD-10-CM

## 2019-04-30 PROCEDURE — 90040 PR PRENATAL FOLLOW UP: CPT | Performed by: NURSE PRACTITIONER

## 2019-04-30 NOTE — PROGRESS NOTES
S:  Pt is  at 25w4d for Centering Session #4.  Reports doing well.  Reports good FM.  Denies VB, LOF, RUCs or vaginal DC.    O:  Please see above vitals.        FHTs: 140        Fundal ht: 27    A:  IUP at 25w4d  Patient Active Problem List    Diagnosis Date Noted   • Supervision of other normal pregnancy 2019   • Tubal pregnancy without intrauterine pregnancy- s/p MTX (2017) 2017        P:   1.  Questions answered.          2.  Encourage adequate water intake        3.  F/u 2wks for Centering session #5        4.  28wk labs ordered. Lab slip & instructions given to pt.           Centering Topics Reviewed:  1.  Thinking about my family  2.  Family planning  3.  Sexuality  4.  Domestic violence and abuse  5.  Fetal brain development  6.   labor  7.  Other topics: ovulation and fertility

## 2019-05-06 ENCOUNTER — HOSPITAL ENCOUNTER (OUTPATIENT)
Dept: LAB | Facility: MEDICAL CENTER | Age: 24
End: 2019-05-06
Attending: NURSE PRACTITIONER
Payer: MEDICAID

## 2019-05-06 DIAGNOSIS — Z3A.25 25 WEEKS GESTATION OF PREGNANCY: ICD-10-CM

## 2019-05-06 LAB
GLUCOSE 1H P 50 G GLC PO SERPL-MCNC: 90 MG/DL (ref 70–139)
HCT VFR BLD AUTO: 40.1 % (ref 37–47)
HGB BLD-MCNC: 12.7 G/DL (ref 12–16)
TREPONEMA PALLIDUM IGG+IGM AB [PRESENCE] IN SERUM OR PLASMA BY IMMUNOASSAY: NON REACTIVE

## 2019-05-06 PROCEDURE — 85014 HEMATOCRIT: CPT

## 2019-05-06 PROCEDURE — 86780 TREPONEMA PALLIDUM: CPT

## 2019-05-06 PROCEDURE — 85018 HEMOGLOBIN: CPT

## 2019-05-06 PROCEDURE — 82950 GLUCOSE TEST: CPT

## 2019-05-06 PROCEDURE — 36415 COLL VENOUS BLD VENIPUNCTURE: CPT

## 2019-05-28 ENCOUNTER — ROUTINE PRENATAL (OUTPATIENT)
Dept: OBGYN | Facility: CLINIC | Age: 24
End: 2019-05-28
Payer: MEDICAID

## 2019-05-28 VITALS — BODY MASS INDEX: 34.54 KG/M2 | SYSTOLIC BLOOD PRESSURE: 121 MMHG | DIASTOLIC BLOOD PRESSURE: 79 MMHG | WEIGHT: 214 LBS

## 2019-05-28 DIAGNOSIS — O43.119 ANTEPARTUM PLACENTA CIRCUMVALLATA: ICD-10-CM

## 2019-05-28 PROCEDURE — 90715 TDAP VACCINE 7 YRS/> IM: CPT | Performed by: NURSE PRACTITIONER

## 2019-05-28 PROCEDURE — 90040 PR PRENATAL FOLLOW UP: CPT | Performed by: NURSE PRACTITIONER

## 2019-05-28 PROCEDURE — 90471 IMMUNIZATION ADMIN: CPT | Performed by: NURSE PRACTITIONER

## 2019-05-28 NOTE — PROGRESS NOTES
Tdap vaccine given today, right deltoid. Screening checklist reviewed with pt verified by Kat SAN

## 2019-05-28 NOTE — LETTER
Cuente los Movimientos de carrillo Bebé  Otro paso importante para la luigi de carrillo bebé    Karlee [facility]     [facility]            Dept: [phone]    ¿Cuántas semanas tiene de embarazo? 29w4d    Fecha cuando tiene que comenzar a contar el movimiento: Hoy                   Sabino debe usar clinton diagrama    Helen manera en que carrillo doctor puede controlar a luigi de carrillo bebé es sabiendo cuantas veces se mueve carrillo bebé en el útero, o por medio de las “pataditas”.  Usted podrá ayudarle a carrillo médico al usar cada día el siguiente diagrama.    Cada día, usted debe prestar atención a cuantas horas le lleva a carrillo bebé moverse 10 veces.  Comience a contar en la mañana, lo antes posible después de haberse levantado.    · Primeramente, escriba la hora en que se mueve carrillo bebé, hasta llegar a 10 veces.  · Colóquele un check o palomita a cada cuadrito cada vez que carrillo bebé se mueva hasta que complete 10 veces.  · Escriba la hora cuando termine de contar 10 veces en la última columna.  · Sume el total del tiempo que le llevó contar los 10 movimientos.  · Finalmente, complete el cuadrito de cuantas horas le llevó hacerlo.    Después de nicky contado los 10 movimientos, ya no tendrá que contar los demás movimientos por el mariano del día.  A la mañana siguiente, comience a contar de nuevo cuantas veces se mueve el bebé desde el momento en que se levante.    ¿Qué tendría que considerarse un “movimiento”?  Es difícil de decirlo porque es distinto de helen madre a otra, y de un embarazo a otro.  Lo importante es que cuente el movimiento de la misma manera tg el transcurso de carrillo embarazo.  Si tiene preguntas adicionales, pregúntele a carrillo doctor.    ¡Cuente cuidadosamente cada día!     MUESTRA:  Semana 28    ¿Cuántas horas le ha llevado sentir 10 movimientos?        Hora de Inicio     1     2     3     4     5     6     7     8     9     10   Hora de Finlizar   Sushil. 8:20 ·  ·  ·  ·  ·  ·  ·  ·  ·  ·  11:40   Mar.               Mié.                Jue.               Vie.               Sáb.               Dom.                 IMPORTANTE:  Usted debe contactar a carrillo doctor si le lleva más de 2 horas sentir 10 movimientos de carrillo bebé.    Cada mañana, escriba la hora de inicio y comience a contar los movimientos de carrillo bebé.  Hágalo colocándole un check o palomita a cada cuadrito cada vez que sienta un movimiento de carrillo bebé.  Cuando haya sentido 10 “pataditas”, escriba la hora en que terminó de contar en la última columna.  Luego, complete en la cajita (arriba de la daren de check o palomita) el número total de horas que le llevó hacerlo.  Asegúrese de leer completamente las instrucciones en la página anterior.

## 2019-05-28 NOTE — PROGRESS NOTES
S:  Pt is  at 29w4d for Centering session #5.  Reports no problems.  Reports good FM.  Denies VB, LOF, RUCs or vaginal DC.    O:  Please see above vitals.        FHTs: 135        Fundal ht: 30     A:  IUP at 29w4d  Patient Active Problem List    Diagnosis Date Noted   • Circumvallate placenta 2019   • Supervision of other normal pregnancy 2019   • Tubal pregnancy without intrauterine pregnancy- s/p MTX (2017) 2017        P:  1.  Declines BTL.  Desires Tdap.         2.  Questions answered.          3.  Encouraged adequate water intake        4.  F/u 2wks Centering Session #6        5.  Continue FKCs.        6. Reviewed circumvallate placenta definition     Centering Topics Reviewed:  1.  Labor  2.  Birth facility  3.  Breathing  4.  Medications for labor & birth  5.  Early labor -- when to call  6.  Kick counts

## 2019-06-11 ENCOUNTER — ROUTINE PRENATAL (OUTPATIENT)
Dept: OBGYN | Facility: CLINIC | Age: 24
End: 2019-06-11
Payer: MEDICAID

## 2019-06-11 VITALS — WEIGHT: 218 LBS | DIASTOLIC BLOOD PRESSURE: 73 MMHG | BODY MASS INDEX: 35.19 KG/M2 | SYSTOLIC BLOOD PRESSURE: 137 MMHG

## 2019-06-11 DIAGNOSIS — O43.119 ANTEPARTUM PLACENTA CIRCUMVALLATA: ICD-10-CM

## 2019-06-11 PROCEDURE — 90040 PR PRENATAL FOLLOW UP: CPT | Performed by: NURSE PRACTITIONER

## 2019-06-11 NOTE — PROGRESS NOTES
S:  Pt is  at 31w4d for Centering session #6.  Reports no issues.  Reports good FM.  Denies VB, LOF, RUCs or vaginal DC.    O:  Please see above vitals.        FHTs: 135        Fundal ht: 32          A:  IUP at 31w4d  Patient Active Problem List    Diagnosis Date Noted   • Circumvallate placenta 2019   • Supervision of other normal pregnancy 2019   • Tubal pregnancy without intrauterine pregnancy- s/p MTX (2017) 2017        P:  1.  Questions answered.          2.  Encouraged adequate water intake        3.  F/u 2wks Centering Session #7        4.  Continue FKCs.      Centering Topics Reviewed:  1.  The Birth Experience/Hospital Tour

## 2019-06-24 NOTE — PROGRESS NOTES
S:  Pt is  at 33w4d for Centering session #7.  Pt has no complaints but notes she is nervous about labor and after with baby, but pt also very excited to meet her daughter.  Reports good FM.  Denies VB, LOF, RUCs or vaginal DC.  O:  Please see above vitals.        FHTs: 132        Fundal ht: 33    A:  IUP at 33w4d  Patient Active Problem List    Diagnosis Date Noted   • Circumvallate placenta 2019   • Supervision of other normal pregnancy 2019   • Tubal pregnancy without intrauterine pregnancy- s/p MTX (2017) 2017        P:  1.  Questions answered.          2.  Encouraged adequate water intake        3.  GBS @ 35wks - next visit        4.  F/u 2wks Centering Session #8    Centering Topics Reviewed:  1.  The 's first days  2.  Planning pediatric care  3.  Caring for your baby  4.  Circumcision  5.  Brothers & sisters  6.   -- when to call  7.  Other topics:  labor

## 2019-06-25 ENCOUNTER — ROUTINE PRENATAL (OUTPATIENT)
Dept: OBGYN | Facility: CLINIC | Age: 24
End: 2019-06-25
Payer: MEDICAID

## 2019-06-25 VITALS — SYSTOLIC BLOOD PRESSURE: 136 MMHG | DIASTOLIC BLOOD PRESSURE: 69 MMHG | BODY MASS INDEX: 35.51 KG/M2 | WEIGHT: 220 LBS

## 2019-06-25 DIAGNOSIS — Z34.80 SUPERVISION OF OTHER NORMAL PREGNANCY, ANTEPARTUM: ICD-10-CM

## 2019-06-25 DIAGNOSIS — O43.113 CIRCUMVALLATE PLACENTA IN THIRD TRIMESTER: ICD-10-CM

## 2019-06-25 PROCEDURE — 90040 PR PRENATAL FOLLOW UP: CPT | Performed by: PHYSICIAN ASSISTANT

## 2019-07-09 ENCOUNTER — HOSPITAL ENCOUNTER (OUTPATIENT)
Facility: MEDICAL CENTER | Age: 24
End: 2019-07-09
Attending: NURSE PRACTITIONER
Payer: MEDICAID

## 2019-07-09 ENCOUNTER — ROUTINE PRENATAL (OUTPATIENT)
Dept: OBGYN | Facility: CLINIC | Age: 24
End: 2019-07-09
Payer: MEDICAID

## 2019-07-09 VITALS — WEIGHT: 221 LBS | BODY MASS INDEX: 35.67 KG/M2 | SYSTOLIC BLOOD PRESSURE: 137 MMHG | DIASTOLIC BLOOD PRESSURE: 88 MMHG

## 2019-07-09 DIAGNOSIS — O43.113 CIRCUMVALLATE PLACENTA IN THIRD TRIMESTER: ICD-10-CM

## 2019-07-09 PROCEDURE — 90040 PR PRENATAL FOLLOW UP: CPT | Performed by: NURSE PRACTITIONER

## 2019-07-09 PROCEDURE — 87081 CULTURE SCREEN ONLY: CPT

## 2019-07-09 PROCEDURE — 87150 DNA/RNA AMPLIFIED PROBE: CPT

## 2019-07-09 ASSESSMENT — EDINBURGH POSTNATAL DEPRESSION SCALE (EPDS)
I HAVE BEEN ANXIOUS OR WORRIED FOR NO GOOD REASON: HARDLY EVER
I HAVE BEEN ABLE TO LAUGH AND SEE THE FUNNY SIDE OF THINGS: AS MUCH AS I ALWAYS COULD
I HAVE BEEN SO UNHAPPY THAT I HAVE BEEN CRYING: ONLY OCCASIONALLY
I HAVE FELT SAD OR MISERABLE: NOT VERY OFTEN
TOTAL SCORE: 5
I HAVE FELT SCARED OR PANICKY FOR NO GOOD REASON: NO, NOT MUCH
I HAVE BEEN SO UNHAPPY THAT I HAVE HAD DIFFICULTY SLEEPING: NOT AT ALL
I HAVE BLAMED MYSELF UNNECESSARILY WHEN THINGS WENT WRONG: NOT VERY OFTEN
THINGS HAVE BEEN GETTING ON TOP OF ME: NO, I HAVE BEEN COPING AS WELL AS EVER
I HAVE LOOKED FORWARD WITH ENJOYMENT TO THINGS: AS MUCH AS I EVER DID
THE THOUGHT OF HARMING MYSELF HAS OCCURRED TO ME: NEVER

## 2019-07-09 NOTE — PROGRESS NOTES
S:  Pt is  at 35w4d for Centering session #8.  Reports no issues.  Reports good FM.  Denies VB, LOF, RUCs or vaginal DC.  O:  Please see above vitals.        FHTs: 125        Fundal ht: 36        Fetal position: vtx          A:  IUP at 35w4d  Patient Active Problem List    Diagnosis Date Noted   • Circumvallate placenta 2019   • Supervision of other normal pregnancy 2019   • Tubal pregnancy without intrauterine pregnancy- s/p MTX (2017) 2017        P:  1.  Questions answered.          2.  Encouraged adequate water intake        3.  GBS collected today         4.  F/u 2wks Centering Session #9    Centering Topics Reviewed:  1.  Pregnancy to parenting transition  2.  Emotional adjustments  3.  Postpartum depression  4.  Pregnancy -- when to call  5.  Other topics: Lactation consultation

## 2019-07-11 LAB — GP B STREP DNA SPEC QL NAA+PROBE: POSITIVE

## 2019-07-12 PROBLEM — B95.1 GROUP BETA STREP POSITIVE: Status: ACTIVE | Noted: 2019-07-12

## 2019-07-22 NOTE — PROGRESS NOTES
S:  Pt is  at 37w4d for Centering session #9/10. Reports feels like baby is pushing down below.  Reports good FM.  Denies VB, LOF, RUCs or vaginal DC.    O:  Please see above vitals.        FHTs: 140        Fundal ht: 37        Fetal position: vtx        GBS positive     A:  IUP at 37w4d  Patient Active Problem List    Diagnosis Date Noted   • Group beta Strep positive 2019   • Circumvallate placenta 2019   • Supervision of other normal pregnancy 2019   • Tubal pregnancy without intrauterine pregnancy- s/p MTX (2017) 2017        P:  1.  Questions answered.          2.  Encouraged adequate water intake        3.  F/u 1 wk for one on one OB f/u    Centering Topics Reviewed:  1.  Putting it all together  2.   safety  3.  Infant massage  4.   care  5.  Growth and development -- the first month  6.  Home and family changes  7.   -- when to call  8.  Mom postpartum -- when to call

## 2019-07-23 ENCOUNTER — ROUTINE PRENATAL (OUTPATIENT)
Dept: OBGYN | Facility: CLINIC | Age: 24
End: 2019-07-23
Payer: MEDICAID

## 2019-07-23 VITALS — DIASTOLIC BLOOD PRESSURE: 79 MMHG | WEIGHT: 226 LBS | BODY MASS INDEX: 36.48 KG/M2 | SYSTOLIC BLOOD PRESSURE: 131 MMHG

## 2019-07-23 DIAGNOSIS — B95.1 GROUP BETA STREP POSITIVE: ICD-10-CM

## 2019-07-23 DIAGNOSIS — O43.113 CIRCUMVALLATE PLACENTA IN THIRD TRIMESTER: ICD-10-CM

## 2019-07-23 PROCEDURE — 90040 PR PRENATAL FOLLOW UP: CPT | Performed by: NURSE PRACTITIONER

## 2019-07-30 ENCOUNTER — ROUTINE PRENATAL (OUTPATIENT)
Dept: OBGYN | Facility: CLINIC | Age: 24
End: 2019-07-30
Payer: MEDICAID

## 2019-07-30 VITALS — DIASTOLIC BLOOD PRESSURE: 72 MMHG | WEIGHT: 229 LBS | SYSTOLIC BLOOD PRESSURE: 128 MMHG | BODY MASS INDEX: 36.96 KG/M2

## 2019-07-30 DIAGNOSIS — Z3A.38 38 WEEKS GESTATION OF PREGNANCY: ICD-10-CM

## 2019-07-30 PROCEDURE — 90040 PR PRENATAL FOLLOW UP: CPT | Performed by: NURSE PRACTITIONER

## 2019-07-30 NOTE — PROGRESS NOTES
SUBJECTIVE:  Pt is a 24 y.o.   at 38w4d  gestation. Presents today for follow-up prenatal care. Reports no issues at this time.  Reports good  fetal movement. Denies cramping/contractions, bleeding or leaking of fluid. Denies dysuria, headaches, N/V, or other issues at this time. Generally feels well today.     OBJECTIVE:  - See prenatal vitals flow  -   Vitals:    19 1048   BP: 128/72   Weight: 103.9 kg (229 lb)                 ASSESSMENT:   - IUP at 38w4d    - S=D   -   Patient Active Problem List    Diagnosis Date Noted   • Group beta Strep positive 2019   • Circumvallate placenta 2019   • Supervision of other normal pregnancy 2019   • Tubal pregnancy without intrauterine pregnancy- s/p MTX (2017) 2017         PLAN:  - S/sx pregnancy and labor warning signs vs general discomforts discussed  - Fetal movements and/or kick counts reviewed   - Adequate hydration reinforced  - Nutrition/exercise/vitamin education; continue PNV  - IOL placed  - Nightly walks   - Anticipatory guidance given  - RTC in 1 weeks for follow-up prenatal care

## 2019-07-30 NOTE — PROGRESS NOTES
OB follow up   + fetal movement.  No VB, LOF   Some contractions   Wt:  229lbs     BP:128/72   Phone # [phone]   Preferred pharmacy confirmed.  Pt states no concerns today   IOL appointment needed

## 2019-08-06 ENCOUNTER — ROUTINE PRENATAL (OUTPATIENT)
Dept: OBGYN | Facility: CLINIC | Age: 24
End: 2019-08-06
Payer: MEDICAID

## 2019-08-06 VITALS — SYSTOLIC BLOOD PRESSURE: 128 MMHG | DIASTOLIC BLOOD PRESSURE: 78 MMHG | WEIGHT: 237 LBS | BODY MASS INDEX: 38.25 KG/M2

## 2019-08-06 DIAGNOSIS — Z34.80 SUPERVISION OF OTHER NORMAL PREGNANCY, ANTEPARTUM: ICD-10-CM

## 2019-08-06 PROCEDURE — 90040 PR PRENATAL FOLLOW UP: CPT | Performed by: NURSE PRACTITIONER

## 2019-08-06 NOTE — PROGRESS NOTES
SUBJECTIVE:  Pt is a 24 y.o.   at 39w4d  gestation. Presents today for follow-up prenatal care. Reports no issues at this time.  Reports good fetal movement. Denies cramping/contractions, bleeding or leaking of fluid. Denies dysuria, headaches, N/V, or other issues at this time. Generally feels well today.     OBJECTIVE:  - See prenatal vitals flow  - There were no vitals filed for this visit.              ASSESSMENT:   - IUP at 39w4d    - S=D   -   Patient Active Problem List    Diagnosis Date Noted   • Group beta Strep positive 2019   • Circumvallate placenta 2019   • Supervision of other normal pregnancy 2019   • Tubal pregnancy without intrauterine pregnancy- s/p MTX (2017) 2017         PLAN:  - S/sx pregnancy and labor warning signs vs general discomforts discussed  - Fetal movements and/or kick counts reviewed   - Adequate hydration reinforced  - Nutrition/exercise/vitamin education; continue PNV  - S/p TDAP vacc   - Anticipatory guidance given  - IOL given; may cancel gel if cervix becomes more favorable next week   - Continue nightly walks  - RTC in 1 weeks for follow-up prenatal care

## 2019-08-06 NOTE — PROGRESS NOTES
OB follow up   + fetal movement.  No VB, LOF or UC's.  Had bleeding this morning around 9, patients states she thinks it might be her mucous plug  IOL: 08/17/2019-10am Gel is 08/16/2019-10PM  Wt: 237 lbs         BP: 128/78  Phone #: [phone]  Preferred pharmacy confirmed.

## 2019-08-08 ENCOUNTER — HOSPITAL ENCOUNTER (OUTPATIENT)
Facility: MEDICAL CENTER | Age: 24
End: 2019-08-08
Attending: OBSTETRICS & GYNECOLOGY | Admitting: OBSTETRICS & GYNECOLOGY
Payer: MEDICAID

## 2019-08-08 ENCOUNTER — TELEPHONE (OUTPATIENT)
Dept: OBGYN | Facility: CLINIC | Age: 24
End: 2019-08-08

## 2019-08-08 ENCOUNTER — HOSPITAL ENCOUNTER (INPATIENT)
Facility: MEDICAL CENTER | Age: 24
LOS: 3 days | End: 2019-08-11
Attending: OBSTETRICS & GYNECOLOGY | Admitting: OBSTETRICS & GYNECOLOGY
Payer: MEDICAID

## 2019-08-08 VITALS
SYSTOLIC BLOOD PRESSURE: 130 MMHG | WEIGHT: 237 LBS | RESPIRATION RATE: 17 BRPM | HEIGHT: 66 IN | BODY MASS INDEX: 38.09 KG/M2 | TEMPERATURE: 97.3 F | HEART RATE: 62 BPM | DIASTOLIC BLOOD PRESSURE: 84 MMHG

## 2019-08-08 LAB
ALBUMIN SERPL BCP-MCNC: 3.7 G/DL (ref 3.2–4.9)
ALBUMIN/GLOB SERPL: 1.1 G/DL
ALP SERPL-CCNC: 159 U/L (ref 30–99)
ALT SERPL-CCNC: 9 U/L (ref 2–50)
ANION GAP SERPL CALC-SCNC: 12 MMOL/L (ref 0–11.9)
APPEARANCE UR: CLEAR
AST SERPL-CCNC: 12 U/L (ref 12–45)
BASOPHILS # BLD AUTO: 0.3 % (ref 0–1.8)
BASOPHILS # BLD: 0.04 K/UL (ref 0–0.12)
BILIRUB SERPL-MCNC: 0.5 MG/DL (ref 0.1–1.5)
BUN SERPL-MCNC: 9 MG/DL (ref 8–22)
CALCIUM SERPL-MCNC: 9.3 MG/DL (ref 8.5–10.5)
CHLORIDE SERPL-SCNC: 108 MMOL/L (ref 96–112)
CO2 SERPL-SCNC: 18 MMOL/L (ref 20–33)
COLOR UR AUTO: YELLOW
CREAT SERPL-MCNC: 0.69 MG/DL (ref 0.5–1.4)
EOSINOPHIL # BLD AUTO: 0.02 K/UL (ref 0–0.51)
EOSINOPHIL NFR BLD: 0.1 % (ref 0–6.9)
ERYTHROCYTE [DISTWIDTH] IN BLOOD BY AUTOMATED COUNT: 47.4 FL (ref 35.9–50)
GLOBULIN SER CALC-MCNC: 3.4 G/DL (ref 1.9–3.5)
GLUCOSE SERPL-MCNC: 79 MG/DL (ref 65–99)
GLUCOSE UR QL STRIP.AUTO: NEGATIVE MG/DL
HCT VFR BLD AUTO: 39.9 % (ref 37–47)
HGB BLD-MCNC: 13.1 G/DL (ref 12–16)
HOLDING TUBE BB 8507: NORMAL
IMM GRANULOCYTES # BLD AUTO: 0.07 K/UL (ref 0–0.11)
IMM GRANULOCYTES NFR BLD AUTO: 0.5 % (ref 0–0.9)
KETONES UR QL STRIP.AUTO: NEGATIVE MG/DL
LEUKOCYTE ESTERASE UR QL STRIP.AUTO: ABNORMAL
LYMPHOCYTES # BLD AUTO: 1.92 K/UL (ref 1–4.8)
LYMPHOCYTES NFR BLD: 12.7 % (ref 22–41)
MCH RBC QN AUTO: 29 PG (ref 27–33)
MCHC RBC AUTO-ENTMCNC: 32.8 G/DL (ref 33.6–35)
MCV RBC AUTO: 88.5 FL (ref 81.4–97.8)
MONOCYTES # BLD AUTO: 1.16 K/UL (ref 0–0.85)
MONOCYTES NFR BLD AUTO: 7.7 % (ref 0–13.4)
NEUTROPHILS # BLD AUTO: 11.94 K/UL (ref 2–7.15)
NEUTROPHILS NFR BLD: 78.7 % (ref 44–72)
NITRITE UR QL STRIP.AUTO: NEGATIVE
NRBC # BLD AUTO: 0 K/UL
NRBC BLD-RTO: 0 /100 WBC
PH UR STRIP.AUTO: 7 [PH] (ref 5–8)
PLATELET # BLD AUTO: 315 K/UL (ref 164–446)
PMV BLD AUTO: 10 FL (ref 9–12.9)
POTASSIUM SERPL-SCNC: 3.9 MMOL/L (ref 3.6–5.5)
PROT SERPL-MCNC: 7.1 G/DL (ref 6–8.2)
PROT UR QL STRIP: NEGATIVE MG/DL
RBC # BLD AUTO: 4.51 M/UL (ref 4.2–5.4)
RBC UR QL AUTO: ABNORMAL
SODIUM SERPL-SCNC: 138 MMOL/L (ref 135–145)
SP GR UR: 1.01 (ref 1–1.03)
URATE SERPL-MCNC: 6.2 MG/DL (ref 1.9–8.2)
WBC # BLD AUTO: 15.2 K/UL (ref 4.8–10.8)

## 2019-08-08 PROCEDURE — 59025 FETAL NON-STRESS TEST: CPT

## 2019-08-08 PROCEDURE — 84550 ASSAY OF BLOOD/URIC ACID: CPT

## 2019-08-08 PROCEDURE — 700111 HCHG RX REV CODE 636 W/ 250 OVERRIDE (IP): Performed by: NURSE PRACTITIONER

## 2019-08-08 PROCEDURE — 770002 HCHG ROOM/CARE - OB PRIVATE (112)

## 2019-08-08 PROCEDURE — 81002 URINALYSIS NONAUTO W/O SCOPE: CPT

## 2019-08-08 PROCEDURE — 36415 COLL VENOUS BLD VENIPUNCTURE: CPT

## 2019-08-08 PROCEDURE — 80053 COMPREHEN METABOLIC PANEL: CPT

## 2019-08-08 PROCEDURE — 85025 COMPLETE CBC W/AUTO DIFF WBC: CPT

## 2019-08-08 PROCEDURE — 700105 HCHG RX REV CODE 258: Performed by: NURSE PRACTITIONER

## 2019-08-08 RX ORDER — SODIUM CHLORIDE, SODIUM LACTATE, POTASSIUM CHLORIDE, CALCIUM CHLORIDE 600; 310; 30; 20 MG/100ML; MG/100ML; MG/100ML; MG/100ML
INJECTION, SOLUTION INTRAVENOUS CONTINUOUS
Status: DISPENSED | OUTPATIENT
Start: 2019-08-08 | End: 2019-08-09

## 2019-08-08 RX ORDER — LABETALOL HYDROCHLORIDE 5 MG/ML
20-40 INJECTION, SOLUTION INTRAVENOUS PRN
Status: DISCONTINUED | OUTPATIENT
Start: 2019-08-08 | End: 2019-08-09

## 2019-08-08 RX ORDER — HYDRALAZINE HYDROCHLORIDE 20 MG/ML
5-10 INJECTION INTRAMUSCULAR; INTRAVENOUS PRN
Status: DISCONTINUED | OUTPATIENT
Start: 2019-08-08 | End: 2019-08-09

## 2019-08-08 RX ORDER — HYDROCODONE BITARTRATE AND ACETAMINOPHEN 5; 325 MG/1; MG/1
2 TABLET ORAL EVERY 4 HOURS PRN
Status: CANCELLED | OUTPATIENT
Start: 2019-08-08

## 2019-08-08 RX ORDER — MISOPROSTOL 200 UG/1
800 TABLET ORAL
Status: DISCONTINUED | OUTPATIENT
Start: 2019-08-08 | End: 2019-08-09 | Stop reason: HOSPADM

## 2019-08-08 RX ORDER — ACETAMINOPHEN 325 MG/1
650 TABLET ORAL EVERY 4 HOURS PRN
Status: CANCELLED | OUTPATIENT
Start: 2019-08-08

## 2019-08-08 RX ORDER — IBUPROFEN 800 MG/1
800 TABLET ORAL EVERY 8 HOURS PRN
Status: CANCELLED | OUTPATIENT
Start: 2019-08-08

## 2019-08-08 RX ORDER — OXYTOCIN 10 [USP'U]/ML
10 INJECTION, SOLUTION INTRAMUSCULAR; INTRAVENOUS
Status: DISCONTINUED | OUTPATIENT
Start: 2019-08-08 | End: 2019-08-09 | Stop reason: HOSPADM

## 2019-08-08 RX ORDER — ACETAMINOPHEN 325 MG/1
325 TABLET ORAL EVERY 4 HOURS PRN
Status: CANCELLED | OUTPATIENT
Start: 2019-08-08

## 2019-08-08 RX ADMIN — Medication 1 MILLI-UNITS/MIN: at 22:27

## 2019-08-08 RX ADMIN — SODIUM CHLORIDE 2.5 MILLION UNITS: 9 INJECTION, SOLUTION INTRAVENOUS at 21:56

## 2019-08-08 RX ADMIN — SODIUM CHLORIDE, POTASSIUM CHLORIDE, SODIUM LACTATE AND CALCIUM CHLORIDE: 600; 310; 30; 20 INJECTION, SOLUTION INTRAVENOUS at 17:50

## 2019-08-08 RX ADMIN — SODIUM CHLORIDE 5 MILLION UNITS: 900 INJECTION INTRAVENOUS at 17:56

## 2019-08-08 ASSESSMENT — LIFESTYLE VARIABLES
TOTAL SCORE: 0
HOW MANY TIMES IN THE PAST YEAR HAVE YOU HAD 5 OR MORE DRINKS IN A DAY: 0
CONSUMPTION TOTAL: NEGATIVE
EVER FELT BAD OR GUILTY ABOUT YOUR DRINKING: NO
ON A TYPICAL DAY WHEN YOU DRINK ALCOHOL HOW MANY DRINKS DO YOU HAVE: 0
EVER_SMOKED: NEVER
TOTAL SCORE: 0
AVERAGE NUMBER OF DAYS PER WEEK YOU HAVE A DRINK CONTAINING ALCOHOL: 0
HAVE YOU EVER FELT YOU SHOULD CUT DOWN ON YOUR DRINKING: NO
TOTAL SCORE: 0
EVER HAD A DRINK FIRST THING IN THE MORNING TO STEADY YOUR NERVES TO GET RID OF A HANGOVER: NO
HAVE PEOPLE ANNOYED YOU BY CRITICIZING YOUR DRINKING: NO
ALCOHOL_USE: NO

## 2019-08-08 ASSESSMENT — COPD QUESTIONNAIRES
HAVE YOU SMOKED AT LEAST 100 CIGARETTES IN YOUR ENTIRE LIFE: NO/DON'T KNOW
DO YOU EVER COUGH UP ANY MUCUS OR PHLEGM?: NO/ONLY WITH OCCASIONAL COLDS OR INFECTIONS
IN THE PAST 12 MONTHS DO YOU DO LESS THAN YOU USED TO BECAUSE OF YOUR BREATHING PROBLEMS: DISAGREE/UNSURE
DURING THE PAST 4 WEEKS HOW MUCH DID YOU FEEL SHORT OF BREATH: NONE/LITTLE OF THE TIME

## 2019-08-08 ASSESSMENT — PATIENT HEALTH QUESTIONNAIRE - PHQ9
2. FEELING DOWN, DEPRESSED, IRRITABLE, OR HOPELESS: NOT AT ALL
1. LITTLE INTEREST OR PLEASURE IN DOING THINGS: NOT AT ALL
SUM OF ALL RESPONSES TO PHQ9 QUESTIONS 1 AND 2: 0

## 2019-08-08 NOTE — PROGRESS NOTES
at 39-6 presents with c/o no FM since she left the hospital at 0500. States she's eaten and drank fluids, Reports ctx slightly worse than when she left. Was last 2 cm. Denies LOF and VB. Denies other health/preg problems. Is GBS +. TOCO/EFM placed.   1439- /-2/cephalic. 10x10 accel with scalp stim. POC discussed.

## 2019-08-08 NOTE — PROGRESS NOTES
0301- Pt presents to triage with c/o ctxs denies any LOF or VB. Denies any complications with this pregnancy. +FM. EFM applied. Vitals WNL. SVE performed. POC discussed, pt verbalized understanding.     0338- Report called to WENCESLAO Swanson. Orders received to walk pt for 1 hour and recheck SVE.    0441- Pt returned to triage. SVE checked. Exam unchanged.     0445- Report called to CR Bradley CNM. Orders received to d/c pt home.     0449- D/C instructions reviewed at bedside by RN. Pt and spouse verbalized understanding. All questions and concerns were answered.    0451- Pt left ambulatory in stable condition accompanied by her .

## 2019-08-08 NOTE — TELEPHONE ENCOUNTER
Pt called stating she was seen in L&D earlier this morning, now having UC q 5-7 min but has not felt baby move since 3 am, has already eaten and drank cold water and no FM. Per consultation with midwife, pt needs to go to L&D for further evaluation.  Pt notified, voiced understanding and will comply. Pt had no further questions or concerns

## 2019-08-09 ENCOUNTER — ANESTHESIA EVENT (OUTPATIENT)
Dept: OBGYN | Facility: MEDICAL CENTER | Age: 24
End: 2019-08-09
Payer: MEDICAID

## 2019-08-09 ENCOUNTER — ANESTHESIA (OUTPATIENT)
Dept: OBGYN | Facility: MEDICAL CENTER | Age: 24
End: 2019-08-09
Payer: MEDICAID

## 2019-08-09 LAB
ERYTHROCYTE [DISTWIDTH] IN BLOOD BY AUTOMATED COUNT: 48.1 FL (ref 35.9–50)
HCT VFR BLD AUTO: 33.9 % (ref 37–47)
HGB BLD-MCNC: 11.2 G/DL (ref 12–16)
MCH RBC QN AUTO: 29.1 PG (ref 27–33)
MCHC RBC AUTO-ENTMCNC: 33 G/DL (ref 33.6–35)
MCV RBC AUTO: 88.1 FL (ref 81.4–97.8)
PLATELET # BLD AUTO: 262 K/UL (ref 164–446)
PMV BLD AUTO: 9.8 FL (ref 9–12.9)
RBC # BLD AUTO: 3.85 M/UL (ref 4.2–5.4)
WBC # BLD AUTO: 20.5 K/UL (ref 4.8–10.8)

## 2019-08-09 PROCEDURE — 700111 HCHG RX REV CODE 636 W/ 250 OVERRIDE (IP): Performed by: ANESTHESIOLOGY

## 2019-08-09 PROCEDURE — 10907ZC DRAINAGE OF AMNIOTIC FLUID, THERAPEUTIC FROM PRODUCTS OF CONCEPTION, VIA NATURAL OR ARTIFICIAL OPENING: ICD-10-PCS | Performed by: OBSTETRICS & GYNECOLOGY

## 2019-08-09 PROCEDURE — 59514 CESAREAN DELIVERY ONLY: CPT

## 2019-08-09 PROCEDURE — 770002 HCHG ROOM/CARE - OB PRIVATE (112)

## 2019-08-09 PROCEDURE — 4A1J72Z MONITORING OF PRODUCTS OF CONCEPTION, NERVOUS CONDUCTIVITY, VIA NATURAL OR ARTIFICIAL OPENING: ICD-10-PCS | Performed by: OBSTETRICS & GYNECOLOGY

## 2019-08-09 PROCEDURE — 700105 HCHG RX REV CODE 258: Performed by: ANESTHESIOLOGY

## 2019-08-09 PROCEDURE — A9270 NON-COVERED ITEM OR SERVICE: HCPCS | Performed by: ANESTHESIOLOGY

## 2019-08-09 PROCEDURE — 306828 HCHG ANES-TIME GENERAL: Performed by: OBSTETRICS & GYNECOLOGY

## 2019-08-09 PROCEDURE — 700101 HCHG RX REV CODE 250: Performed by: ANESTHESIOLOGY

## 2019-08-09 PROCEDURE — 36415 COLL VENOUS BLD VENIPUNCTURE: CPT

## 2019-08-09 PROCEDURE — 305385 HCHG SURGICAL SERVICES 1/4 HOUR: Performed by: OBSTETRICS & GYNECOLOGY

## 2019-08-09 PROCEDURE — 85027 COMPLETE CBC AUTOMATED: CPT

## 2019-08-09 PROCEDURE — 59514 CESAREAN DELIVERY ONLY: CPT | Mod: 80

## 2019-08-09 PROCEDURE — 304966 HCHG RECOVERY SVSC TIME ADDL 1/2 HR: Performed by: OBSTETRICS & GYNECOLOGY

## 2019-08-09 PROCEDURE — 59510 CESAREAN DELIVERY: CPT | Performed by: OBSTETRICS & GYNECOLOGY

## 2019-08-09 PROCEDURE — 700102 HCHG RX REV CODE 250 W/ 637 OVERRIDE(OP): Performed by: ANESTHESIOLOGY

## 2019-08-09 PROCEDURE — 304964 HCHG RECOVERY ROOM TIME 1HR: Performed by: OBSTETRICS & GYNECOLOGY

## 2019-08-09 PROCEDURE — 700105 HCHG RX REV CODE 258: Performed by: NURSE PRACTITIONER

## 2019-08-09 PROCEDURE — 10H07YZ INSERTION OF OTHER DEVICE INTO PRODUCTS OF CONCEPTION, VIA NATURAL OR ARTIFICIAL OPENING: ICD-10-PCS | Performed by: OBSTETRICS & GYNECOLOGY

## 2019-08-09 PROCEDURE — 700105 HCHG RX REV CODE 258

## 2019-08-09 PROCEDURE — 700111 HCHG RX REV CODE 636 W/ 250 OVERRIDE (IP): Performed by: NURSE PRACTITIONER

## 2019-08-09 RX ORDER — ONDANSETRON 2 MG/ML
4 INJECTION INTRAMUSCULAR; INTRAVENOUS EVERY 6 HOURS PRN
Status: DISCONTINUED | OUTPATIENT
Start: 2019-08-09 | End: 2019-08-10

## 2019-08-09 RX ORDER — KETOROLAC TROMETHAMINE 30 MG/ML
INJECTION, SOLUTION INTRAMUSCULAR; INTRAVENOUS PRN
Status: DISCONTINUED | OUTPATIENT
Start: 2019-08-09 | End: 2019-08-09 | Stop reason: SURG

## 2019-08-09 RX ORDER — MISOPROSTOL 200 UG/1
600 TABLET ORAL
Status: DISCONTINUED | OUTPATIENT
Start: 2019-08-09 | End: 2019-08-11 | Stop reason: HOSPADM

## 2019-08-09 RX ORDER — DIPHENHYDRAMINE HYDROCHLORIDE 50 MG/ML
25 INJECTION INTRAMUSCULAR; INTRAVENOUS EVERY 6 HOURS PRN
Status: DISCONTINUED | OUTPATIENT
Start: 2019-08-09 | End: 2019-08-10

## 2019-08-09 RX ORDER — PHENYLEPHRINE HYDROCHLORIDE 10 MG/ML
INJECTION, SOLUTION INTRAMUSCULAR; INTRAVENOUS; SUBCUTANEOUS PRN
Status: DISCONTINUED | OUTPATIENT
Start: 2019-08-09 | End: 2019-08-09 | Stop reason: SURG

## 2019-08-09 RX ORDER — BUPIVACAINE HYDROCHLORIDE 7.5 MG/ML
INJECTION, SOLUTION INTRASPINAL PRN
Status: DISCONTINUED | OUTPATIENT
Start: 2019-08-09 | End: 2019-08-09 | Stop reason: SURG

## 2019-08-09 RX ORDER — DOCUSATE SODIUM 100 MG/1
100 CAPSULE, LIQUID FILLED ORAL 2 TIMES DAILY PRN
Status: DISCONTINUED | OUTPATIENT
Start: 2019-08-09 | End: 2019-08-11 | Stop reason: HOSPADM

## 2019-08-09 RX ORDER — CITRIC ACID/SODIUM CITRATE 334-500MG
30 SOLUTION, ORAL ORAL ONCE
Status: COMPLETED | OUTPATIENT
Start: 2019-08-09 | End: 2019-08-09

## 2019-08-09 RX ORDER — SODIUM CHLORIDE, SODIUM LACTATE, POTASSIUM CHLORIDE, CALCIUM CHLORIDE 600; 310; 30; 20 MG/100ML; MG/100ML; MG/100ML; MG/100ML
INJECTION, SOLUTION INTRAVENOUS
Status: COMPLETED
Start: 2019-08-09 | End: 2019-08-09

## 2019-08-09 RX ORDER — KETOROLAC TROMETHAMINE 30 MG/ML
30 INJECTION, SOLUTION INTRAMUSCULAR; INTRAVENOUS EVERY 6 HOURS
Status: COMPLETED | OUTPATIENT
Start: 2019-08-09 | End: 2019-08-10

## 2019-08-09 RX ORDER — OXYCODONE HYDROCHLORIDE 10 MG/1
10 TABLET ORAL EVERY 4 HOURS PRN
Status: DISCONTINUED | OUTPATIENT
Start: 2019-08-09 | End: 2019-08-10

## 2019-08-09 RX ORDER — HYDROMORPHONE HYDROCHLORIDE 1 MG/ML
0.2 INJECTION, SOLUTION INTRAMUSCULAR; INTRAVENOUS; SUBCUTANEOUS
Status: DISCONTINUED | OUTPATIENT
Start: 2019-08-09 | End: 2019-08-10

## 2019-08-09 RX ORDER — SIMETHICONE 80 MG
80 TABLET,CHEWABLE ORAL 4 TIMES DAILY PRN
Status: DISCONTINUED | OUTPATIENT
Start: 2019-08-09 | End: 2019-08-11 | Stop reason: HOSPADM

## 2019-08-09 RX ORDER — VITAMIN A ACETATE, BETA CAROTENE, ASCORBIC ACID, CHOLECALCIFEROL, .ALPHA.-TOCOPHEROL ACETATE, DL-, THIAMINE MONONITRATE, RIBOFLAVIN, NIACINAMIDE, PYRIDOXINE HYDROCHLORIDE, FOLIC ACID, CYANOCOBALAMIN, CALCIUM CARBONATE, FERROUS FUMARATE, ZINC OXIDE, CUPRIC OXIDE 3080; 12; 120; 400; 1; 1.84; 3; 20; 22; 920; 25; 200; 27; 10; 2 [IU]/1; UG/1; MG/1; [IU]/1; MG/1; MG/1; MG/1; MG/1; MG/1; [IU]/1; MG/1; MG/1; MG/1; MG/1; MG/1
1 TABLET, FILM COATED ORAL EVERY MORNING
Status: DISCONTINUED | OUTPATIENT
Start: 2019-08-09 | End: 2019-08-11 | Stop reason: HOSPADM

## 2019-08-09 RX ORDER — HYDROMORPHONE HYDROCHLORIDE 1 MG/ML
0.4 INJECTION, SOLUTION INTRAMUSCULAR; INTRAVENOUS; SUBCUTANEOUS
Status: DISCONTINUED | OUTPATIENT
Start: 2019-08-09 | End: 2019-08-10

## 2019-08-09 RX ORDER — OXYTOCIN 10 [USP'U]/ML
INJECTION, SOLUTION INTRAMUSCULAR; INTRAVENOUS PRN
Status: DISCONTINUED | OUTPATIENT
Start: 2019-08-09 | End: 2019-08-09 | Stop reason: SURG

## 2019-08-09 RX ORDER — ONDANSETRON 2 MG/ML
INJECTION INTRAMUSCULAR; INTRAVENOUS PRN
Status: DISCONTINUED | OUTPATIENT
Start: 2019-08-09 | End: 2019-08-09 | Stop reason: SURG

## 2019-08-09 RX ORDER — OXYCODONE HYDROCHLORIDE 5 MG/1
5 TABLET ORAL EVERY 4 HOURS PRN
Status: DISCONTINUED | OUTPATIENT
Start: 2019-08-09 | End: 2019-08-10

## 2019-08-09 RX ORDER — SODIUM CHLORIDE, SODIUM GLUCONATE, SODIUM ACETATE, POTASSIUM CHLORIDE AND MAGNESIUM CHLORIDE 526; 502; 368; 37; 30 MG/100ML; MG/100ML; MG/100ML; MG/100ML; MG/100ML
1500 INJECTION, SOLUTION INTRAVENOUS ONCE
Status: COMPLETED | OUTPATIENT
Start: 2019-08-09 | End: 2019-08-09

## 2019-08-09 RX ORDER — CEFAZOLIN SODIUM 1 G/3ML
INJECTION, POWDER, FOR SOLUTION INTRAMUSCULAR; INTRAVENOUS PRN
Status: DISCONTINUED | OUTPATIENT
Start: 2019-08-09 | End: 2019-08-09 | Stop reason: SURG

## 2019-08-09 RX ORDER — DIPHENHYDRAMINE HYDROCHLORIDE 50 MG/ML
12.5 INJECTION INTRAMUSCULAR; INTRAVENOUS EVERY 6 HOURS PRN
Status: DISCONTINUED | OUTPATIENT
Start: 2019-08-09 | End: 2019-08-10

## 2019-08-09 RX ORDER — SODIUM CHLORIDE, SODIUM LACTATE, POTASSIUM CHLORIDE, CALCIUM CHLORIDE 600; 310; 30; 20 MG/100ML; MG/100ML; MG/100ML; MG/100ML
INJECTION, SOLUTION INTRAVENOUS PRN
Status: DISCONTINUED | OUTPATIENT
Start: 2019-08-09 | End: 2019-08-11 | Stop reason: HOSPADM

## 2019-08-09 RX ORDER — ACETAMINOPHEN 500 MG
1000 TABLET ORAL EVERY 6 HOURS
Status: COMPLETED | OUTPATIENT
Start: 2019-08-09 | End: 2019-08-10

## 2019-08-09 RX ORDER — DEXAMETHASONE SODIUM PHOSPHATE 4 MG/ML
INJECTION, SOLUTION INTRA-ARTICULAR; INTRALESIONAL; INTRAMUSCULAR; INTRAVENOUS; SOFT TISSUE PRN
Status: DISCONTINUED | OUTPATIENT
Start: 2019-08-09 | End: 2019-08-09 | Stop reason: SURG

## 2019-08-09 RX ORDER — MORPHINE SULFATE 0.5 MG/ML
INJECTION, SOLUTION EPIDURAL; INTRATHECAL; INTRAVENOUS PRN
Status: DISCONTINUED | OUTPATIENT
Start: 2019-08-09 | End: 2019-08-09 | Stop reason: SURG

## 2019-08-09 RX ORDER — METOCLOPRAMIDE HYDROCHLORIDE 5 MG/ML
10 INJECTION INTRAMUSCULAR; INTRAVENOUS ONCE
Status: COMPLETED | OUTPATIENT
Start: 2019-08-09 | End: 2019-08-09

## 2019-08-09 RX ORDER — SODIUM CHLORIDE, SODIUM GLUCONATE, SODIUM ACETATE, POTASSIUM CHLORIDE AND MAGNESIUM CHLORIDE 526; 502; 368; 37; 30 MG/100ML; MG/100ML; MG/100ML; MG/100ML; MG/100ML
INJECTION, SOLUTION INTRAVENOUS
Status: DISCONTINUED | OUTPATIENT
Start: 2019-08-09 | End: 2019-08-09 | Stop reason: SURG

## 2019-08-09 RX ADMIN — FENTANYL CITRATE 100 MCG: 0.05 INJECTION, SOLUTION INTRAMUSCULAR; INTRAVENOUS at 02:04

## 2019-08-09 RX ADMIN — SODIUM CHLORIDE, POTASSIUM CHLORIDE, SODIUM LACTATE AND CALCIUM CHLORIDE 1000 ML: 600; 310; 30; 20 INJECTION, SOLUTION INTRAVENOUS at 05:55

## 2019-08-09 RX ADMIN — OXYTOCIN 20 UNITS: 10 INJECTION, SOLUTION INTRAMUSCULAR; INTRAVENOUS at 06:59

## 2019-08-09 RX ADMIN — KETOROLAC TROMETHAMINE 30 MG: 30 INJECTION, SOLUTION INTRAMUSCULAR at 07:24

## 2019-08-09 RX ADMIN — SODIUM CHLORIDE, SODIUM GLUCONATE, SODIUM ACETATE, POTASSIUM CHLORIDE AND MAGNESIUM CHLORIDE 1500 ML: 526; 502; 368; 37; 30 INJECTION, SOLUTION INTRAVENOUS at 06:11

## 2019-08-09 RX ADMIN — SODIUM CHLORIDE, SODIUM GLUCONATE, SODIUM ACETATE, POTASSIUM CHLORIDE AND MAGNESIUM CHLORIDE: 526; 502; 368; 37; 30 INJECTION, SOLUTION INTRAVENOUS at 06:25

## 2019-08-09 RX ADMIN — FENTANYL CITRATE 15 MCG: 50 INJECTION, SOLUTION INTRAMUSCULAR; INTRAVENOUS at 06:32

## 2019-08-09 RX ADMIN — PHENYLEPHRINE HYDROCHLORIDE 100 MCG: 10 INJECTION INTRAVENOUS at 06:48

## 2019-08-09 RX ADMIN — FENTANYL CITRATE 100 MCG: 0.05 INJECTION, SOLUTION INTRAMUSCULAR; INTRAVENOUS at 03:53

## 2019-08-09 RX ADMIN — BUPIVACAINE HYDROCHLORIDE IN DEXTROSE 1.6 ML: 7.5 INJECTION, SOLUTION SUBARACHNOID at 06:32

## 2019-08-09 RX ADMIN — ONDANSETRON 4 MG: 2 INJECTION INTRAMUSCULAR; INTRAVENOUS at 07:05

## 2019-08-09 RX ADMIN — MORPHINE SULFATE 0.1 MG: 0.5 INJECTION, SOLUTION EPIDURAL; INTRATHECAL; INTRAVENOUS at 06:32

## 2019-08-09 RX ADMIN — FAMOTIDINE 20 MG: 10 INJECTION INTRAVENOUS at 06:10

## 2019-08-09 RX ADMIN — SODIUM CHLORIDE 2.5 MILLION UNITS: 9 INJECTION, SOLUTION INTRAVENOUS at 02:04

## 2019-08-09 RX ADMIN — CEFAZOLIN 2 G: 330 INJECTION, POWDER, FOR SOLUTION INTRAMUSCULAR; INTRAVENOUS at 06:37

## 2019-08-09 RX ADMIN — PHENYLEPHRINE HYDROCHLORIDE 100 MCG: 10 INJECTION INTRAVENOUS at 06:40

## 2019-08-09 RX ADMIN — DEXAMETHASONE SODIUM PHOSPHATE 4 MG: 4 INJECTION, SOLUTION INTRA-ARTICULAR; INTRALESIONAL; INTRAMUSCULAR; INTRAVENOUS; SOFT TISSUE at 07:05

## 2019-08-09 RX ADMIN — ACETAMINOPHEN 1000 MG: 500 TABLET ORAL at 21:36

## 2019-08-09 RX ADMIN — SODIUM CITRATE AND CITRIC ACID MONOHYDRATE 30 ML: 500; 334 SOLUTION ORAL at 06:10

## 2019-08-09 RX ADMIN — KETOROLAC TROMETHAMINE 30 MG: 30 INJECTION, SOLUTION INTRAMUSCULAR; INTRAVENOUS at 13:29

## 2019-08-09 RX ADMIN — DIPHENHYDRAMINE HYDROCHLORIDE 12.5 MG: 50 INJECTION INTRAMUSCULAR; INTRAVENOUS at 10:02

## 2019-08-09 RX ADMIN — ACETAMINOPHEN 1000 MG: 500 TABLET ORAL at 09:53

## 2019-08-09 RX ADMIN — KETOROLAC TROMETHAMINE 30 MG: 30 INJECTION, SOLUTION INTRAMUSCULAR; INTRAVENOUS at 19:32

## 2019-08-09 RX ADMIN — ACETAMINOPHEN 1000 MG: 500 TABLET ORAL at 15:26

## 2019-08-09 RX ADMIN — METOCLOPRAMIDE 10 MG: 5 INJECTION, SOLUTION INTRAMUSCULAR; INTRAVENOUS at 06:10

## 2019-08-09 RX ADMIN — PHENYLEPHRINE HYDROCHLORIDE 100 MCG: 10 INJECTION INTRAVENOUS at 07:18

## 2019-08-09 ASSESSMENT — PAIN SCALES - GENERAL: PAIN_LEVEL: 0

## 2019-08-09 NOTE — ANESTHESIA TIME REPORT
Anesthesia Start and Stop Event Times     Date Time Event    8/9/2019 0616 Ready for Procedure     0625 Anesthesia Start     0738 Anesthesia Stop        Responsible Staff  08/09/19    Name Role Begin End    David Adorno M.D. Anesth 0625 0700    Humza Arcos M.D. Anesth 0700 0738        Preop Diagnosis (Free Text):  Pre-op Diagnosis     Fetal Intolerance of labor, Failure to progress IUP 40        Preop Diagnosis (Codes):    Post op Diagnosis  Fetal intolerance to labor, delivered, current hospitalization  failure to progress, pregnancy    Premium Reason  A. 3PM - 7AM    Comments:

## 2019-08-09 NOTE — LACTATION NOTE
"MOB requesting pacifier for baby.  Educated MOB on the risks to infant and milk supply when pacifier is introduced to infant prior to the establishment of breastfeeding.  MOB provided with hand-out titled \"What About Pacifiers...\".    MOB stated she will hold off on using a pacifier at this time.  "

## 2019-08-09 NOTE — PROGRESS NOTES
[facility] LABOR AND DELIVERY PROGRESS NOTE    PATIENT ID:  NAME:  Karlee Vallejo  MRN:               [id number]  YOB: 1995     24 y.o. female  at 40w0d.    Subjective: Meme more frequently. Not requiring IV fentanyl.    Objective:    Vitals:    19 2136 19 2235 19 2306 19 2335   BP: 143/97 129/75 129/80 134/94   Pulse: 79 86 78 79   Temp:       TempSrc:       Weight:       Height:           Cervix:  5cm/80%/-1  [place]: Uterine Contractions Q2-3 minutes.  FHRM: Baseline 145, Accels to 160, 1x prolong decel after getting up to use the restroom, moderate variability  Pitocin: 6 america-units/min  Pain control: IV fentanyl    Assessment: 24 y.o. female  at 40w0d GBS+ s/p 2 doses of pen G in latent labor with category 2 tracing    Plan:   1. Continue current management  2. Anticipate  delivery

## 2019-08-09 NOTE — CARE PLAN
Problem: Pain  Goal: Alleviation of Pain or a reduction in pain to the patient's comfort goal  Outcome: PROGRESSING AS EXPECTED  Note:   Pt goal is to use non-pharm methods for pain control. Birthball provided.      Problem: Risk for Infection, Impaired Wound Healing  Goal: Remain free from signs and symptoms of infection  Outcome: PROGRESSING AS EXPECTED  Note:   Pt monitored for s/s of infection. None at this time.

## 2019-08-09 NOTE — OP REPORT
DATE OF SERVICE:  2019    PREOPERATIVE DIAGNOSES:  Intrauterine pregnancy at 40-1/7th weeks with   gestational hypertension, arrest of dilatation at 4 cm and fetal intolerance   to labor.    POSTOPERATIVE DIAGNOSES:  Intrauterine pregnancy at 40-1/7th weeks with   gestational hypertension, arrest of dilatation at 4 cm and fetal intolerance   to labor.    SURGEON:  David Cantor MD    ASSISTANT:  Jonathan Lambert MD    ANESTHESIA:  Spinal.    ANESTHESIOLOGIST:  David Adorno MD    PROCEDURE:  Primary low transverse uterine  section.    SPECIMENS:  Cord gas sent to pathology department.    DRAINS:  Mckeon catheter.    COMPLICATIONS:  None.    ESTIMATED BLOOD LOSS:  500 mL.    INDICATIONS:  This patient is a 24-year-old  female  3, para 0   at 40+ weeks intrauterine pregnancy.  The patient was admitted in early labor   and was subsequently found to be having elevated blood pressures.  PIH labs   were normal.  The patient was started on Pitocin augmentation of labor.  She   dilated to approximately 4 cm with no further dilatation despite adequate   labor with IUPC.  Clear amniotic fluid noted at the time of amniotomy.  The   patient did not dilate past 4 cm.  She was having intermittent prolonged   decelerations for approximately 2.5-3 minutes and decreased variability.    FINDINGS:  Apgar scores 8 and 9, cephalic presentation, meconium-stained   amniotic fluid.    Cord gas results, arterial pH 7.20 with a base excess of -5, venous pH 7.30   with a base excess of -5.  Normal pelvis.    DESCRIPTION OF OPERATION:  After adequately being counseled, the patient was   taken to the operating room and placed in supine position.  Spinal anesthetic   was placed.  Fetal heart tones were noted to be normal before and after   placement of spinal.  She was prepped and draped in the usual sterile fashion.    Mckeon catheter was placed.  A Pfannenstiel skin incision made 2 cm above the   pubic symphysis  with a scalpel, taken down to the fascia.  The fascia was   incised with scalpel.  Fascial incision was taken laterally on both sides with   Guo scissors.  Rectus fascia dissected off the underlying rectus muscles   both superiorly and inferiorly and the rectus muscles were split in the   midline.  The peritoneal cavity was entered bluntly with digits as high as   possible, taking care to avoid any internal viscera.  Peritoneal incision was   taken superiorly and inferiorly with Metzenbaum scissors.  Bladder blade   placed over the bladder.  Next, bladder flap was developed both sharply and   bluntly using Metzenbaum scissors and a bladder blade placed over the bladder.    Next, a low transverse uterine incision and the uterus was made with a   scalpel and the infant was delivered by placing a bell-shaped plastic Mityvac   vacuum cup on the fetal vertex and applying gentle traction while pressure was   placed on the infant's buttocks externally.  The vacuum cup did slide off the   head one time and was replaced and short time later, the infant delivered   without difficulty on the second attempt.  The vacuum cup was only on the   fetal vertex for approximately 30 seconds for each attempt of vacuum   extraction.  The umbilical cord was clamped and cut, infant handed off to   pediatrics.  The placenta was removed from the uterus.  Uterine cavity was   cleansed with a moist laparotomy sponge.  The uterus was exteriorized and   closed using running nonlocked stitch of 0 Vicryl, imbricating stitch using 0   Vicryl used to reapproximate the uterus, so it was closed in 2 layers as   noted.  Good hemostasis noted.  Uterus returned to abdominal pelvic cavity.    Pelvis was irrigated and suctioned and hemostasis again confirmed in the   hysterotomy site.  Peritoneal lining was closed using running nonlocked stitch   of 0 Vicryl and the rectus muscles were reapproximated using interrupted   stitch of 0 chromic.  The rectus  fascia closed using running nonlocked stitch   of 0 Vicryl.  Subcutaneous tissues were irrigated and suctioned and   electrocautery was used for hemostasis.  Skin was closed using surgical   staples and a pressure dressing was applied.  The patient was taken to   recovery room in good condition.  No complications noted.       ____________________________________     MD LORENE FUNES / LUNA    DD:  08/09/2019 07:49:18  DT:  08/09/2019 08:03:59    D#:  [id number]  Job#:  [id number]

## 2019-08-09 NOTE — ANESTHESIA PROCEDURE NOTES
Spinal Block  Performed by: David Adorno M.D.  Authorized by: David Adorno M.D.     Patient Location:  OR  Start Time:  8/9/2019 6:28 AM  End Time:  8/9/2019 6:35 AM  Reason for Block: primary anesthetic    patient identified, IV checked, site marked, risks and benefits discussed, surgical consent, monitors and equipment checked, pre-op evaluation and timeout performed    Patient Position:  Sitting  Prep: ChloraPrep, patient draped and sterile technique    Monitoring:  Blood pressure, continuous pulse oximetry and heart rate  Approach:  Midline  Location:  L3-4  Injection Technique:  Single-shot  Skin infiltration:  Lidocaine  Strength:  1%  Dose:  3ml  Needle Type:  Pencan  Needle Gauge:  25 G  CSF flowing pre/post injection:  Yes  Sensory Level:  T4

## 2019-08-09 NOTE — PROGRESS NOTES
[facility] LABOR AND DELIVERY PROGRESS NOTE    PATIENT ID:  NAME:  Karlee Vallejo  MRN:               [id number]  YOB: 1995     24 y.o. female  at 39w6d.    Subjective: Patient reports more discomfort with contractions. Does not want epidural at this time. Wish to continue with IV pain medications. No LOF or bleeding      Objective:    Vitals:    19 2035 19 2105 19 2136   BP: 142/90 119/74 141/94 143/97   Pulse: 82 73 83 79   Temp:       TempSrc:       Weight:       Height:           Cervix:  4-5cm/70%/-1  [place]: Uterine Contractions Q5 minutes.  FHRM: [id number], Accels to 160, 2 prolonged decels that responded to postioning, moderate variability  Pitocin: Started at 2230. 1 america-unit/hr with increase by 2 america-units q30min  Pain control: Fentanyl    Assessment: 24 y.o. female  at 39w6d in latent labor with category 2 tracing    Plan:   1. Continue current management  2. Start pitocin  3. Anticipate  delivery  4. No epidural at this time.

## 2019-08-09 NOTE — PROGRESS NOTES
Pt arrived to room via gurney with infant and fob. Report from Kalyn CARREON. Assumed care of pt. Oriented to room unit and security. Assessment wnl. Call bell in reach.

## 2019-08-09 NOTE — PROGRESS NOTES
0700: Assumed care of pt. Surgery in process, pt AAO.  0734: Pt to PACU in stable condition  0840: Pt and infant transferred to postpartum via gurney, report given to Candelaria CARREON, pt in stable condition with a firm fundus and light lochia, ID bands verified.

## 2019-08-09 NOTE — H&P
Obstetrics & Gynecology History & Physical Note    Date of Service  2019    Chief Complaint  Decreased fetal movement. Reports contractions has been since early this am but not LOF, no vaginal bleeding. Has felt movement since arriving to triage. Denies contractions getting much stronger from earlier triage visit at 3am. Denies changes in vision, SOB, RUQ pain, swelling. Reports just recently starting to get a headache but feels it as light and may be related to not sleeping last night and last ate at noon today.     History of Presenting Illness  Karlee Vallejo is a 24 y.o.  at 39w6d 2019, by Ultrasound. Patient's last menstrual period was 10/13/2018. She is being admitted for observation, labor management, augmentation.     Prenatal care is at [facility] and is complicated by:  1. GBS positive   2. Circumvallate placenta     Patient Active Problem List    Diagnosis Date Noted   • Group beta Strep positive 2019   • Circumvallate placenta 2019   • Supervision of other normal pregnancy 2019   • Tubal pregnancy without intrauterine pregnancy- s/p MTX (2017) 2017       Obstetric History  OB History    Para Term  AB Living   3       2     SAB TAB Ectopic Molar Multiple Live Births   2                # Outcome Date GA Lbr Isidro/2nd Weight Sex Delivery Anes PTL Lv   3 Current            2 SAB 2018 5w0d    SAB         Birth Comments: passed on its own   1 SAB 17     ECTOPIC         Birth Comments: methotrexate treatment       Gynecologic History  Pap neg in 2019     Review of Systems  ROS    Medical History   has no past medical history of Ulcer.    Surgical History   has a past surgical history that includes eye surgery (2016) and eye surgery ().     Family History  family history is not on file.     Social History   reports that she has never smoked. She has never used smokeless tobacco. She reports that she does not drink alcohol or use  drugs.    Allergies  No Known Allergies    Medications  Prior to Admission Medications   Prescriptions Last Dose Informant Patient Reported? Taking?   Folic Acid 0.8 MG Cap   Yes No   Sig: Take  by mouth.   Prenatal Vit-Fe Fumarate-FA (PRENATAL VITAMIN) 27-0.8 MG Tab   Yes No   Sig: Take  by mouth.   Progesterone 100 MG Suppos   No No   Sig: Insert 1 Suppository in vagina 2 Times a Day.   Patient not taking: Reported on 11/2/2018      Facility-Administered Medications: None       Physical Exam  Vitals:    08/08/19 1603 08/08/19 1617 08/08/19 1633 08/08/19 1647   BP: 139/88 144/92 129/87 139/97   Pulse: 71 86 74 78   Temp:       TempSrc:       Weight:       Height:           General:   alert, cooperative, no distress   Skin:   normal   HEENT:  extraocular movements intact   Lungs:   clear to auscultation bilaterally   Heart:   regular rate and rhythm   Breasts:   deferred   Abdomen:  Abdomen soft, non-tender; gravid.   Pelvis: Exam deferred.   FHT:  135 BPM Fetal heart variability: moderate   [place]:     Presentations: Cephalic by RN   Cervix:     Dilation: 3    Effacement: 50    Station:  -2    Consistency:      Position:         Laboratory:  Prenatal Results     General (Most Recent Result)     Test Value Reference Range Date Time    ABO A   01/17/19 1129    Rh POS   01/17/19 1129    Antibody screen NEG   01/17/19 1129    HbA1c        Gonorrhea Negative  Negative 01/17/19 1101    Chlamydia  by PCR Negative  Negative 01/17/19 1101    Chlamydia by DNA        RPR/Syphilus Non Reactive  Non Reactive 05/06/19 0921    HSV 1/2 by PCR (non-serum)        HSV 1/2 (serum)        HSV 1        HSV 2        HPV (16)        HPV (18)        HPV (other)        HBsAg Negative  Negative 01/17/19 1129    HIV-1 HIV-2 Antibodies Non Reactive  Non Reactive 01/17/19 1129    Rubella 182.60 IU/mL  01/17/19 1129    Tb              Pap Smear (Most Recent Result)     Test Value Reference Range Date Time    Pap smear        Pap smear w/HPV         Pap smear w/CTNG        Pap smar w/HPV CTNG        Pap smear (reflex HPV ACUS)        Pap smear (reflex HPV ASCUS w/CTNG)  (See Report)    19 1101    Pathology gyn specimen  (See Report)    19 1101          Urinalysis (Most Recent Result)     Test Value Reference Range Date Time    Urinalysis        Urinalysis (culture if indicated)        POC urinalysis  (See Report)    19 0957    Urine drug screen        Urine drug screen (w/o conf)        Urine culture ([id number])        Urine culture ([id number])              1st Trimester     Test Value Reference Range Date Time    Hgb 14.7 g/dL 12.0 - 16.0 19 1129    Hct 42.7 % 37.0 - 47.0 19 1129    Fasting Glucose Tolerance        GTT, 1 hour        GTT, 2 hours        GTT, 3 hours              2nd Trimester     Test Value Reference Range Date Time    Hgb 12.7 g/dL 12.0 - 16.0 19 0921    Hct 40.1 % 37.0 - 47.0 19 0921    Urinalysis        Urine Culture        AST        ALT        Uric Acid        Fasting Glucose Tolerance        GTT, 1 hour        GTT, 2 hours        GTT, 3 hours        Urine Protein/Creatinine Ratio              3rd Trimester     Test Value Reference Range Date Time    Hgb 13.1 g/dL 12.0 - 16.0 19 1624    Hct 39.9 % 37.0 - 47.0 19 1624    Platelet count 315 K/uL 164 - 446 19 1624    GBS (OCHOA BROTH) POSITIVE  Negative 19 1310    GBS (Direct) POSITIVE  Negative 19 1310    Urinalysis        Urine Culture        Urine Drug Screen        Urine Protein/Creatinine Ratio  (See Report)    19 1616          Congenital Disease Screening     Test Value Reference Range Date Time    First Trimester Screen        Quad Screen        Sickle Cell        Thalassemia        [facility]        Cystic Fibrosis Carrier Study                      Urinalysis:    No results found     Imaging:  No orders to display         Assessment:  24 y.o.  39w6d who presents with  Pregnancy    Plan:  No new  Assessment & Plan notes have been filed under this hospital service since the last note was generated.  Service: Obstetrics & Gynecology    1. Admit to L&D  2. BP monitoring/control  3. PIH labs pending   4. GBS: positive  5. Pt in early labor; augmentation as needed with pitocin   5. Pain Control: pt does not desire epidural is considering IV pain medication     VTE prophylaxis: not indicated with ambulation     ELIDA Rios.

## 2019-08-09 NOTE — PROGRESS NOTES
Cervix-4 cm / 80% effaced/-1 station without cervical change    Fetus continues to have intermittent prolonged decelerations to 80 bpm for 2 to 3 minutes now with decreased variability.    Recommend immediate  section    Charge nurse notified at 0600 hrs.    Operating room and anesthesia notified also

## 2019-08-09 NOTE — ANESTHESIA POSTPROCEDURE EVALUATION
Patient: Karlee Vallejo    Procedure Summary     Date:  19 Room / Location:  [facility] OR  / LABOR AND DELIVERY    Anesthesia Start:  625 Anesthesia Stop:  738    Procedure:   SECTION, PRIMARY (N/A Abdomen) Diagnosis:  (Primary  section, FTP/FD)    Surgeon:  David Cantor M.D. Responsible Provider:  Humza Arcos M.D.    Anesthesia Type:  spinal ASA Status:  2          Final Anesthesia Type: spinal  Last vitals  BP   Blood Pressure: 109/54    Temp   36.3 °C (97.4 °F)    Pulse   Pulse: 65   Resp        SpO2   96 %      Anesthesia Post Evaluation    Patient location during evaluation: PACU  Patient participation: complete - patient participated  Level of consciousness: awake and alert  Pain score: 0    Airway patency: patent  Anesthetic complications: no  Cardiovascular status: hemodynamically stable  Respiratory status: acceptable  Hydration status: euvolemic    PONV: none           Nurse Pain Score: 0 (NPRS)

## 2019-08-09 NOTE — PROGRESS NOTES
[facility] LABOR AND DELIVERY PROGRESS NOTE    PATIENT ID:  NAME:  Karlee Vallejo  MRN:               [id number]  YOB: 1995     24 y.o. female  at 40w0d.    Subjective: Patient underwent AROM at 0200. Resting comfortably.      Objective:    Vitals:    19 0335 19 0405 19 0435 19 0506   BP: 137/88 113/57 144/60 139/92   Pulse: 73 76 77 71   Temp:       TempSrc:       Weight:       Height:           Cervix:  5cm/80%/-2  [place]: Uterine Contractions Q3 minutes.   FHRM: Baseline 135, Accels to 160, 3 prolonged decels after AROM,  Moderate variability  Pitocin: 6 america-units/min  Pain control: Fentanyl    Assessment: 24 y.o. female  at 40w0d s/p AROM @ 0200 with IUPC placement and category 2 tracing    Plan:   1. Continue current management  2. Anticipate  delivery  3. Will continue to monitor tracing closely

## 2019-08-09 NOTE — CONSULTS
"Met with MOB for an initial lactation visit.  MOB delivered her first baby today at 0657 at 40 weeks gestation.  Infant is approximately 5 hours old.  MOB requested observance of latch.    Assisted MOB with putting infant to the left breast in the cross cradle position.  Demonstrated wedging of breast for deeper latch and positioning of hands on infant and breast.  Encouraged MOB to wait for infant to open his mouth wide before placing her nipple into infant's mouth.  Deep latch achieved.  Good jaw movement with swallowing visualized.  MOB denied pain with latch.  See Lactation Assessment Flow Sheet for latch score and assessment.    Discussed what to expect with breastfeeding in the first 24-48-72 hours following delivery as well as signs of successful milk transfer.    Provided MOB with \"Getting To Know Your Baby\" pamphlet.    Discussed nutritive vs non-nutritive suck and how to stimulate infant to suck in nutritive manner via touch stimulation.    Informed MOB of the outpatient lactation assistance available to her through [facility] and the [facility] [place].  .  Demonstrated to MOB on how to perform hand expression.  Several drops of colostrum expelled easily from the left breast.    Breastfeeding Plan:  Offer infant the breast on demand per feeding cues and within 3 hours from the last feed for a minimum of 8-12 times in a 24 hour period.  If infant unable to latch onto the breast between the 12th and 24th hour of life, MOB encouraged to hand express colostrum onto a spoon and feed back expressed breast milk to infant.    MOB verbalized understanding of all information provided to her and denied having any further questions at this time.  Encouraged MOB to call for lactation assistance as needed.  "

## 2019-08-09 NOTE — ANESTHESIA QCDR
2019 [facility] Clinical Data Registry (for Quality Improvement)     Postoperative nausea/vomiting risk protocol (Adult = 18 yrs and Pediatric 3-17 yrs)- (430 and 463)  General inhalation anesthetic (NOT TIVA) with PONV risk factors: No  Provision of anti-emetic therapy with at least 2 different classes of agents: N/A  Patient DID NOT receive anti-emetic therapy and reason is documented in Medical Record: N/A    Multimodal Pain Management- (AQI59)  Patient undergoing Elective Surgery (i.e. Outpatient, or ASC, or Prescheduled Surgery prior to Hospital Admission): No  Use of Multimodal Pain Management, two or more drugs and/or interventions, NOT including systemic opioids: N/A  Exception: Documented allergy to multiple classes of analgesics: N/A    PACU assessment of acute postoperative pain prior to Anesthesia Care End- Applies to Patients Age = 18- (ABG7)  Initial PACU pain score is which of the following: < 7/10  Patient unable to report pain score: N/A    Post-anesthetic transfer of care checklist/protocol to PACU/ICU- (426 and 427)  Upon conclusion of case, patient transferred to which of the following locations: PACU/Non-ICU  Use of transfer checklist/protocol: Yes  Exclusion: Service Performed in Patient Hospital Room (and thus did not require transfer): N/A    PACU Reintubation- (AQI31)  General anesthesia requiring endotracheal intubation (ETT) along with subsequent extubation in OR or PACU: No  Required reintubation in the PACU: N/A  Extubation was a planned trial documented in the medical record prior to removal of the original airway device: N/A    Unplanned admission to ICU related to anesthesia service up through end of PACU care- (MD51)  Unplanned admission to ICU (not initially anticipated at anesthesia start time): No

## 2019-08-09 NOTE — ANESTHESIA PREPROCEDURE EVALUATION
G3PO, failure to progress, fetal intolerance. Uncomplicated pregnancy    Relevant Problems   No relevant active problems       Physical Exam    Airway   Mallampati: III  TM distance: >3 FB  Neck ROM: full       Cardiovascular - normal exam  Rhythm: regular  Rate: normal  (-) murmur     Dental - normal exam         Pulmonary - normal exam  Breath sounds clear to auscultation     Abdominal    Neurological - normal exam                 Anesthesia Plan    ASA 2       Plan - spinal   Neuraxial block will be primary anesthetic            Postoperative Plan: Postoperative administration of opioids is intended.    Pertinent diagnostic labs and testing reviewed    Informed Consent:    Anesthetic plan and risks discussed with patient.

## 2019-08-09 NOTE — PROGRESS NOTES
1730: Report received from JACQUELINE Medina RN. POC discussed. Pt being admitted for IOL due to elevated BP. Pt transferred to S2. EFM/[place] in place. Admission procedures completed. All questions answered. Reports pain at 4/10 and plans on a non-medicated labor. Cliff WINKLER at bedside.  1742: REGGIE Massey CNM at bedside. E, 4/70/-2.  1855: Report given to NILS Ayala RN. POC discussed.

## 2019-08-09 NOTE — PROGRESS NOTES
1900 - Report received from Nila CARREON. Antibiotic running. UCs becoming more uncomfortable, birth ball offered and provided. Pt and  deny any questions or needs at this time.   2200 - Dr. Lambert updated on decels that have occurred, Dr. Cantor also updated through Dr. Lambert.   2212 - SVE: 4-5/70/-2 by Dr. Lambert and RN. Dr. Cantor updated. Plan is to begin pitocin, pt comfortable with plan. Pt still not wanting epidural or pain medication at this time.   0102 - Pt feeling increased pressure. Dr. Lambert called, orders to recheck SVE: 5/80/-2. Dr. Cantor updated on SVE and notified of prolonged decel after pt getting up to bathroom, resolved with position change.   0138 - Dr. Cantor called and updated on prolonged decel that resolved with position change to right from left, 500ml bolus given. Dr. Cantor on way to bedside. MD also updated on minimal variability and BP. SVE by Dr. Cantor 4/80/0, AROM clear fluid at 0156, IUPC placed. Pt encouraged to ask questions, all care explained.   0313 - Pt feeling rectal pressure. SVE by Michaela CARREON unchanged. Small amount of [place] noted.   0436 - Prolonged decel into 70s for 3 min, pt laying on back when RN came into room because pt states she was trying to turn. Decel resolved with turn to right. Dr. Cantor called and updated.  0541 - Pt again had prolonged decel down into 65 lasting 2.5 min. Pt turned to right. Dr. Lambert and Dr. Cantor notified. MDs coming to bedside.   0600 - SVE by Dr. Cantor unchanged. C/S called for fetal intolerance to labor and failure to progress by Dr. Cantor. Pt prepped for surgery.    0625 - Pt back in OR.   0657 - Del of viable infant female. Apgars 8/9. Vacuum x2 done by MD before delivery. Cord gasses sent.   0700 - Report given to Kalyn CARREON.

## 2019-08-09 NOTE — PROGRESS NOTES
Cervix-4 cm dilated/80% effaced/0 station    AROM-clear fluid    IUPC placed    Patient has had several decelerations lasting approximately 2 minutes some down to 100 bpm most recent one down to 80 bpm.  Variability is decreased.-Category 2 fetal heart rate tracing

## 2019-08-10 PROCEDURE — 700111 HCHG RX REV CODE 636 W/ 250 OVERRIDE (IP): Performed by: ANESTHESIOLOGY

## 2019-08-10 PROCEDURE — 700112 HCHG RX REV CODE 229: Performed by: OBSTETRICS & GYNECOLOGY

## 2019-08-10 PROCEDURE — A9270 NON-COVERED ITEM OR SERVICE: HCPCS | Performed by: OBSTETRICS & GYNECOLOGY

## 2019-08-10 PROCEDURE — A9270 NON-COVERED ITEM OR SERVICE: HCPCS | Performed by: ANESTHESIOLOGY

## 2019-08-10 PROCEDURE — 700102 HCHG RX REV CODE 250 W/ 637 OVERRIDE(OP): Performed by: OBSTETRICS & GYNECOLOGY

## 2019-08-10 PROCEDURE — 770002 HCHG ROOM/CARE - OB PRIVATE (112)

## 2019-08-10 PROCEDURE — 700102 HCHG RX REV CODE 250 W/ 637 OVERRIDE(OP): Performed by: ANESTHESIOLOGY

## 2019-08-10 RX ORDER — MORPHINE SULFATE 4 MG/ML
4 INJECTION, SOLUTION INTRAMUSCULAR; INTRAVENOUS
Status: DISCONTINUED | OUTPATIENT
Start: 2019-08-10 | End: 2019-08-11 | Stop reason: HOSPADM

## 2019-08-10 RX ORDER — ONDANSETRON 2 MG/ML
4 INJECTION INTRAMUSCULAR; INTRAVENOUS EVERY 6 HOURS PRN
Status: DISCONTINUED | OUTPATIENT
Start: 2019-08-10 | End: 2019-08-11 | Stop reason: HOSPADM

## 2019-08-10 RX ORDER — DIPHENHYDRAMINE HCL 25 MG
25 TABLET ORAL EVERY 6 HOURS PRN
Status: DISCONTINUED | OUTPATIENT
Start: 2019-08-10 | End: 2019-08-11 | Stop reason: HOSPADM

## 2019-08-10 RX ORDER — ACETAMINOPHEN 325 MG/1
325 TABLET ORAL EVERY 4 HOURS PRN
Status: DISCONTINUED | OUTPATIENT
Start: 2019-08-10 | End: 2019-08-11 | Stop reason: HOSPADM

## 2019-08-10 RX ORDER — ONDANSETRON 4 MG/1
4 TABLET, ORALLY DISINTEGRATING ORAL EVERY 6 HOURS PRN
Status: DISCONTINUED | OUTPATIENT
Start: 2019-08-10 | End: 2019-08-11 | Stop reason: HOSPADM

## 2019-08-10 RX ORDER — OXYCODONE HYDROCHLORIDE 10 MG/1
10 TABLET ORAL EVERY 4 HOURS PRN
Status: DISCONTINUED | OUTPATIENT
Start: 2019-08-10 | End: 2019-08-11 | Stop reason: HOSPADM

## 2019-08-10 RX ORDER — OXYCODONE HYDROCHLORIDE AND ACETAMINOPHEN 5; 325 MG/1; MG/1
1 TABLET ORAL EVERY 4 HOURS PRN
Status: DISCONTINUED | OUTPATIENT
Start: 2019-08-10 | End: 2019-08-11 | Stop reason: HOSPADM

## 2019-08-10 RX ORDER — IBUPROFEN 600 MG/1
600 TABLET ORAL EVERY 6 HOURS PRN
Status: DISCONTINUED | OUTPATIENT
Start: 2019-08-10 | End: 2019-08-11 | Stop reason: HOSPADM

## 2019-08-10 RX ORDER — DIPHENHYDRAMINE HYDROCHLORIDE 50 MG/ML
25 INJECTION INTRAMUSCULAR; INTRAVENOUS EVERY 6 HOURS PRN
Status: DISCONTINUED | OUTPATIENT
Start: 2019-08-10 | End: 2019-08-11 | Stop reason: HOSPADM

## 2019-08-10 RX ADMIN — KETOROLAC TROMETHAMINE 30 MG: 30 INJECTION, SOLUTION INTRAMUSCULAR; INTRAVENOUS at 01:58

## 2019-08-10 RX ADMIN — SIMETHICONE CHEW TAB 80 MG 80 MG: 80 TABLET ORAL at 05:38

## 2019-08-10 RX ADMIN — KETOROLAC TROMETHAMINE 30 MG: 30 INJECTION, SOLUTION INTRAMUSCULAR; INTRAVENOUS at 07:33

## 2019-08-10 RX ADMIN — DOCUSATE SODIUM 100 MG: 100 CAPSULE, LIQUID FILLED ORAL at 05:38

## 2019-08-10 RX ADMIN — ACETAMINOPHEN 1000 MG: 500 TABLET ORAL at 03:48

## 2019-08-10 RX ADMIN — VITAMIN A, VITAMIN C, VITAMIN D-3, VITAMIN E, VITAMIN B-1, VITAMIN B-2, NIACIN, VITAMIN B-6, CALCIUM, IRON, ZINC, COPPER 1 TABLET: 4000; 120; 400; 22; 1.84; 3; 20; 10; 1; 12; 200; 27; 25; 2 TABLET ORAL at 05:39

## 2019-08-10 NOTE — PROGRESS NOTES
Report received from Eileen CARREON, patient in bed socializing with visitors. Denies pain at this time. Will continue to monitor.

## 2019-08-10 NOTE — LACTATION NOTE
This note was copied from a baby's chart.  Follow-up visit. Baby showing hunger cues, reinforced with mother baby is cueing and wants to breastfeed. LC assisted baby to right breast using cross cradle hold, skin to skin, obtained deep latch with coordinated suck. Contact information for outpatient lactation through [facility] & invited to [facility] [place]. Encouraged mother to call for any lactation needs.     Reinforced teaching on hunger cues, breastfeeding when baby shows cues or by 3 hours from last feed, importance of skin to skin, positioning baby at breast & waiting for baby to open wide for deep latch.    Breastfeeding plan unchanged:  Offer infant the breast on demand per feeding cues and within 3 hours from the last feed for minimum of 8-12 times in a 24 hour period.

## 2019-08-10 NOTE — CARE PLAN
Problem: Altered physiologic condition related to postoperative  delivery  Goal: Patient physiologically stable as evidenced by normal lochia, palpable uterine involution and vital signs within normal limits  Outcome: PROGRESSING AS EXPECTED  Note:   Fundus firm at U, lochia rubra minimal. Surgical dressing CDI. V/S within normal parameters.      Problem: Alteration in comfort related to surgical incision and/or after birth pains  Goal: Patient verbalizes acceptable pain level  Outcome: PROGRESSING AS EXPECTED  Note:   Patient verbalized acceptable pain level at this time. Will be medicated as scheduled.

## 2019-08-10 NOTE — PROGRESS NOTES
Assumed care of patient, received report from night RN. Pt denied pain at this time, scheduled medication given. Communication board updated and reviewed POC with pt and significant other. Assessment complete. No other needs at this time. Call light and personal belongings within reach.

## 2019-08-10 NOTE — PROGRESS NOTES
Post Partum Progress Note    Name:   Karlee Vallejo   Date/Time:  8/10/2019 - 6:40 AM  Chief Admitting Dx:  Pregnancy  Indication for care in labor or delivery  Delivery Type:   for fetal distress  Post-Op/Post Partum Days #:  1    Subjective:  Abdominal pain: no  Ambulating:   yes  Tolerating liquids:  yes  Tolerating food:  yes common adult  Flatus:   yes  BM:    no  Bleeding:   without any bleeding  Voiding:   yes  Dizziness:   no  Feeding:   breast    Vitals:    19 2100 19 2233 08/10/19 0225 08/10/19 0616   BP:  107/59 (!) 96/53 122/57   Pulse: 68 63 61 (!) 54   Resp:    Temp:  36.8 °C (98.2 °F) 36.6 °C (97.8 °F) 36.7 °C (98 °F)   TempSrc:  Temporal Temporal Temporal   SpO2: 97% 96% 94% 95%   Weight:       Height:           Exam:  Breast: Tenderness no, Engorged no and Lactating yes  Abdomen: Abdomen soft, non-tender. BS normal. No masses,  No organomegaly  Fundal Tenderness:  no  Fundus Firm: yes  Incision: dry and intact  Below umbilicus: yes  Perineum: perineum intact  Lochia: mild  Extremities: Normal, trace extremities, peripheral pulses and reflexes normal, no edema, redness or tenderness in the calves or thighs, Homans sign is negative, no sign of DVT, feet normal, good pulses, normal color, temperature and sensation    Meds:  Current Facility-Administered Medications   Medication Dose   • ketorolac (TORADOL) injection 30 mg  30 mg   • oxyCODONE immediate-release (ROXICODONE) tablet 5 mg  5 mg   • oxyCODONE immediate release (ROXICODONE) tablet 10 mg  10 mg   • HYDROmorphone pf (DILAUDID) injection 0.2 mg  0.2 mg   • HYDROmorphone pf (DILAUDID) injection 0.4 mg  0.4 mg   • ondansetron (ZOFRAN) syringe/vial injection 4 mg  4 mg   • diphenhydrAMINE (BENADRYL) injection 12.5 mg  12.5 mg   • diphenhydrAMINE (BENADRYL) injection 12.5 mg  12.5 mg    Or   • diphenhydrAMINE (BENADRYL) injection 25 mg  25 mg    Or   • naloxone (NARCAN) 0.4 mg in NS 1,000 mL infusion  0.4 mg   •  LR infusion     • PRN oxytocin (PITOCIN) (20 Units/1000 mL) PRN for excessive uterine bleeding - See Admin Instr  125-999 mL/hr   • miSOPROStol (CYTOTEC) tablet 600 mcg  600 mcg   • docusate sodium (COLACE) capsule 100 mg  100 mg   • simethicone (MYLICON) chewable tab 80 mg  80 mg   • prenatal plus vitamin (STUARTNATAL 1+1) 27-1 MG tablet 1 Tab  1 Tab   • magnesium hydroxide (MILK OF MAGNESIA) suspension 30 mL  30 mL   • oxytocin (PITOCIN) infusion (for induction)  0.5-20 [place]-units/min       Labs:   Recent Labs     19  1624 19  1758   WBC 15.2* 20.5*   RBC 4.51 3.85*   HEMOGLOBIN 13.1 11.2*   HEMATOCRIT 39.9 33.9*   MCV 88.5 88.1   MCH 29.0 29.1   MCHC 32.8* 33.0*   RDW 47.4 48.1   PLATELETCT 315 262   MPV 10.0 9.8       Assessment:  Chief Admitting Dx:  Pregnancy  Indication for care in labor or delivery  Delivery Type:   for fetal distress  Tubal Ligation:  no    Plan:  Continue routine post partum care.  Lactation support  Anticipate discharge POD#3    Shayna Velasquez C.N.M.

## 2019-08-11 VITALS
RESPIRATION RATE: 18 BRPM | HEART RATE: 70 BPM | HEIGHT: 66 IN | BODY MASS INDEX: 36.96 KG/M2 | TEMPERATURE: 98 F | DIASTOLIC BLOOD PRESSURE: 62 MMHG | OXYGEN SATURATION: 95 % | SYSTOLIC BLOOD PRESSURE: 122 MMHG | WEIGHT: 230 LBS

## 2019-08-11 PROCEDURE — 700102 HCHG RX REV CODE 250 W/ 637 OVERRIDE(OP): Performed by: OBSTETRICS & GYNECOLOGY

## 2019-08-11 PROCEDURE — A9270 NON-COVERED ITEM OR SERVICE: HCPCS | Performed by: OBSTETRICS & GYNECOLOGY

## 2019-08-11 PROCEDURE — 700112 HCHG RX REV CODE 229: Performed by: OBSTETRICS & GYNECOLOGY

## 2019-08-11 RX ORDER — IBUPROFEN 600 MG/1
600 TABLET ORAL EVERY 6 HOURS PRN
Qty: 30 TAB | Refills: 0 | Status: SHIPPED | OUTPATIENT
Start: 2019-08-11 | End: 2020-10-29

## 2019-08-11 RX ORDER — PSEUDOEPHEDRINE HCL 30 MG
100 TABLET ORAL 2 TIMES DAILY PRN
Qty: 60 CAP | Refills: 0 | Status: SHIPPED | OUTPATIENT
Start: 2019-08-11 | End: 2020-10-29

## 2019-08-11 RX ORDER — OXYCODONE HYDROCHLORIDE AND ACETAMINOPHEN 5; 325 MG/1; MG/1
1 TABLET ORAL EVERY 4 HOURS PRN
Qty: 15 TAB | Refills: 0 | Status: SHIPPED | OUTPATIENT
Start: 2019-08-11 | End: 2019-08-18

## 2019-08-11 RX ADMIN — IBUPROFEN 600 MG: 600 TABLET ORAL at 13:26

## 2019-08-11 RX ADMIN — OXYCODONE HYDROCHLORIDE AND ACETAMINOPHEN 1 TABLET: 5; 325 TABLET ORAL at 03:12

## 2019-08-11 RX ADMIN — DOCUSATE SODIUM 100 MG: 100 CAPSULE, LIQUID FILLED ORAL at 05:44

## 2019-08-11 RX ADMIN — VITAMIN A, VITAMIN C, VITAMIN D-3, VITAMIN E, VITAMIN B-1, VITAMIN B-2, NIACIN, VITAMIN B-6, CALCIUM, IRON, ZINC, COPPER 1 TABLET: 4000; 120; 400; 22; 1.84; 3; 20; 10; 1; 12; 200; 27; 25; 2 TABLET ORAL at 05:44

## 2019-08-11 RX ADMIN — IBUPROFEN 600 MG: 600 TABLET ORAL at 03:12

## 2019-08-11 ASSESSMENT — EDINBURGH POSTNATAL DEPRESSION SCALE (EPDS)
I HAVE BEEN ANXIOUS OR WORRIED FOR NO GOOD REASON: NO, NOT AT ALL
I HAVE LOOKED FORWARD WITH ENJOYMENT TO THINGS: AS MUCH AS I EVER DID
I HAVE FELT SAD OR MISERABLE: NO, NOT AT ALL
I HAVE BEEN SO UNHAPPY THAT I HAVE BEEN CRYING: NO, NEVER
THINGS HAVE BEEN GETTING ON TOP OF ME: NO, MOST OF THE TIME I HAVE COPED QUITE WELL
I HAVE BEEN SO UNHAPPY THAT I HAVE HAD DIFFICULTY SLEEPING: NOT AT ALL
I HAVE FELT SCARED OR PANICKY FOR NO GOOD REASON: NO, NOT MUCH
I HAVE BLAMED MYSELF UNNECESSARILY WHEN THINGS WENT WRONG: YES, MOST OF THE TIME
THE THOUGHT OF HARMING MYSELF HAS OCCURRED TO ME: NEVER
I HAVE BEEN ABLE TO LAUGH AND SEE THE FUNNY SIDE OF THINGS: AS MUCH AS I ALWAYS COULD

## 2019-08-11 NOTE — DISCHARGE INSTRUCTIONS
POSTPARTUM DISCHARGE INSTRUCTIONS FOR MOM    YOB: 1995   Age: 24 y.o.               Admit Date: 2019     Discharge Date: 2019  Attending Doctor:  Michoacano Trujillo M.D.                  Allergies:  Patient has no known allergies.    Discharged to home by car. Discharged via wheelchair, hospital escort: Yes.  Special equipment needed: Not Applicable  Belongings with: Personal  Be sure to schedule a follow-up appointment with your primary care doctor or any specialists as instructed.     Discharge Plan:   Diet Plan: Discussed  Activity Level: Discussed  Confirmed Follow up Appointment: Patient to Call and Schedule Appointment  Confirmed Symptoms Management: Discussed  Medication Reconciliation Updated: Yes  Influenza Vaccine Indication: Indicated: Not available from distributor/    REASONS TO CALL YOUR OBSTETRICIAN:  1.   Persistent fever or shaking chills (Temperature higher than 100.4)  2.   Heavy bleeding (soaking more than 1 pad per hour); Passing clots  3.   Foul odor from vagina  4.   Mastitis (Breast infection; breast pain, chills, fever, redness)  5.   Urinary pain, burning or frequency  6.   Episiotomy infection  7.   Abdominal incision infection  8.   Severe depression longer than 24 hours    HAND WASHING  · Prior to handling the baby.  · Before breastfeeding or bottle feeding baby.  · After using the bathroom or changing the baby's diaper.    WOUND CARE  Ask your physician for additional care instructions.  In general:    ·  Incision:      · Keep clean and dry.    · Do NOT lift anything heavier than your baby for up to 6 weeks.    · There should not be any opening or pus.      VAGINAL CARE  · Nothing inside vagina for 6 weeks: no sexual intercourse, tampons or douching.  · Bleeding may continue for 2-4 weeks.  Amount may vary.    · Call your physician for heavy bleeding which means soaking more than 1 pad per hour    BIRTH CONTROL  · It is possible to become  "pregnant at any time after delivery and while breastfeeding.  · Plan to discuss a method of birth control with your physician at your follow up visit. visit.    DIET AND ELIMINATION  · Eating more fiber (bran cereal, fruits, and vegetables) and drinking plenty of fluids will help to avoid constipation.  · Urinary frequency after childbirth is normal.    POSTPARTUM BLUES  During the first few days after birth, you may experience a sense of the \"blues\" which may include impatience, irritability or even crying.  These feeling come and go quickly.  However, as many as 1 in 10 women experience emotional symptoms known as postpartum depression.    Postpartum depression:  May start as early as the second or third day after delivery or take several weeks or months to develop.  Symptoms of \"blues\" are present, but are more intense:  Crying spells; loss of appetite; feelings of hopelessness or loss of control; fear of touching the baby; over concern or no concern at all about the baby; little or no concern about your own appearance/caring for yourself; and/or inability to sleep or excessive sleeping.  Contact your physician if you are experiencing any of these symptoms.    Crisis Hotline:  · [place] Crisis Hotline:  3-883-HSXDUSS  Or 1-256.395.7856  · [place] Crisis Hotline:  1-301.310.6330  Or 097-608-1799    PREVENTING SHAKEN BABY:  If you are angry or stressed, PUT THE BABY IN THE CRIB, step away, take some deep breaths, and wait until you are calm to care for the baby.  DO NOT SHAKE THE BABY.  You are not alone, call a supporter for help.    · Crisis Call Center 24/7 crisis line 050-960-9230 or 1-193.825.5860  · You can also text them, text \"ANSWER\" to 623219    QUIT SMOKING/TOBACCO USE:  I understand the use of any tobacco products increases my chance of suffering from future heart disease and could cause other illnesses which may shorten my life. Quitting the use of tobacco products is the single most important thing I " can do to improve my health. For further information on smoking / tobacco cessation call a Toll Free Quit Line at 1-230.533.3257 (*[employer] [place]) or 1-608.594.7571 (American Lung Association) or you can access the web based program at www.lungusa.org.    · [place] Tobacco Users Help Line:  (379) 921-5583       Toll Free: 1-199.884.3457  · Quit Tobacco Program [facility] (130)667-6807    DEPRESSION / SUICIDE RISK:  As you are discharged from this [facility], it is important to learn how to keep safe from harming yourself.    Recognize the warning signs:  · Abrupt changes in personality, positive or negative- including increase in energy   · Giving away possessions  · Change in eating patterns- significant weight changes-  positive or negative  · Change in sleeping patterns- unable to sleep or sleeping all the time   · Unwillingness or inability to communicate  · Depression  · Unusual sadness, discouragement and loneliness  · Talk of wanting to die  · Neglect of personal appearance   · Rebelliousness- reckless behavior  · Withdrawal from people/activities they love  · Confusion- inability to concentrate     If you or a loved one observes any of these behaviors or has concerns about self-harm, here's what you can do:  · Talk about it- your feelings and reasons for harming yourself  · Remove any means that you might use to hurt yourself (examples: pills, rope, extension cords, firearm)  · Get professional help from the community (Mental Health, Substance Abuse, psychological counseling)  · Do not be alone:Call your Safe Contact- someone whom you trust who will be there for you.  · Call your local CRISIS HOTLINE 761-3345 or 903-646-7980  · Call your local Children's Mobile Crisis Response Team [place] (079) 856-1824 or www.YourPlace  · Call the toll free National Suicide Prevention Hotlines   · National Suicide Prevention Lifeline 060-133-IGLG (1872)  · National  [facility] 800-SUICIDE (930-8644)    DISCHARGE SURVEY:  Thank you for choosing [facility].  We hope we provided you with very good care.  You may be receiving a survey in the mail.  Please fill it out.  Your opinion is valuable to us.    ADDITIONAL EDUCATIONAL MATERIALS GIVEN TO PATIENT:        My signature on this form indicates that:  1.  I have reviewed and understand the above information  2.  My questions regarding this information have been answered to my satisfaction.  3.  I have formulated a plan with my discharge nurse to obtain my prescribed medication for home.

## 2019-08-11 NOTE — CARE PLAN
Problem: Potential for postpartum infection related to surgical incision, compromised uterine condition, urinary tract or respiratory compromise  Goal: Patient will be afebrile and free from signs and symptoms of infection  Outcome: PROGRESSING AS EXPECTED  Note:   Patient afebrile; no s/s of infection.      Problem: Potential knowledge deficit related to lack of understanding of self and  care  Goal: Patient will verbalize understanding of self and infant care  Outcome: PROGRESSING AS EXPECTED  Note:   Discharge education reviewed with patient; verbalizes understanding.

## 2019-08-11 NOTE — PROGRESS NOTES
Post partum teaching complete. Patient claims all questions have been answered. Denies problems. Follow up instructions given.

## 2019-08-11 NOTE — CARE PLAN
Problem: Altered physiologic condition related to postoperative  delivery  Goal: Patient physiologically stable as evidenced by normal lochia, palpable uterine involution and vital signs within normal limits  Outcome: PROGRESSING AS EXPECTED  Note:   Fundus firm at U, lochia rubra minimal. Surgical incision with staples CDI. V/S stable at this time.      Problem: Potential for postpartum infection related to surgical incision, compromised uterine condition, urinary tract or respiratory compromise  Goal: Patient will be afebrile and free from signs and symptoms of infection  Outcome: PROGRESSING AS EXPECTED  Note:   Patient has no S/S of infection noted at this time.

## 2019-08-11 NOTE — PROGRESS NOTES
Report received at 0700. Assessment completed: abdominal incision hakeem with staples. Patient ambulates by self. Fundus firm, lochia light rubra. Patient states pain 0/10; declines medication. Patient would like to request medication when needed. Bed in lowest locked position. Call light in place. All questions and concerns addressed. Will continue to monitor.

## 2019-08-11 NOTE — PROGRESS NOTES
Bedside report done, patient in bed socializing with visitors. Denies pain at this time.    doxycycline 100 mg bid x ten days

## 2019-08-11 NOTE — DISCHARGE SUMMARY
Discharge Summary:      Karlee Vallejo    Admit Date:   2019  Discharge Date:  2019     Admitting diagnosis:  Pregnancy  Indication for care in labor or delivery  Discharge Diagnosis: Status post  for fetal distress, failure to progress.  Pregnancy Complications: group B strep (untreated)  Tubal Ligation:  no        History:  Past Medical History:   Diagnosis Date   • Gestational hypertension, third trimester 2019     OB History    Para Term  AB Living   3 1 1   2 1   SAB TAB Ectopic Molar Multiple Live Births   2       0 1      # Outcome Date GA Lbr Isidro/2nd Weight Sex Delivery Anes PTL Lv   3 Term 19 40w0d  3.405 kg (7 lb 8.1 oz) F CS-LTranv Spinal N MENA      Complications: Fetal Intolerance, Failure to Progress in First Stage   2 SAB 2018 5w0d    SAB         Birth Comments: passed on its own   1 SAB 17     ECTOPIC         Birth Comments: methotrexate treatment        Patient has no known allergies.  Patient Active Problem List    Diagnosis Date Noted   • Group beta Strep positive 2019   • Circumvallate placenta 2019   • Supervision of other normal pregnancy 2019   • Tubal pregnancy without intrauterine pregnancy- s/p MTX (2017) 2017        Hospital Course:   24 y.o. , now para 2, was admitted with the above mentioned diagnosis, underwent Active Labor,  for failure to progress. Patient postpartum course was unremarkable, with progressive advancement in diet , ambulation and toleration of oral analgesia. Patient without complaints today and desires discharge.      Vitals:    08/10/19 0225 08/10/19 0616 08/10/19 1800 19 0549   BP: (!) 96/53 122/57 127/80 122/62   Pulse: 61 (!) 54 69 70   Resp: 18  18 18   Temp: 36.6 °C (97.8 °F) 36.7 °C (98 °F) 36.4 °C (97.6 °F) 36.7 °C (98 °F)   TempSrc: Temporal Temporal Temporal Temporal   SpO2: 94% 95% 95% 95%   Weight:       Height:           Current  Facility-Administered Medications   Medication Dose   • ibuprofen (MOTRIN) tablet 600 mg  600 mg   • acetaminophen (TYLENOL) tablet 325 mg  325 mg   • oxyCODONE-acetaminophen (PERCOCET) 5-325 MG per tablet 1 Tab  1 Tab   • oxyCODONE immediate release (ROXICODONE) tablet 10 mg  10 mg   • morphine (pf) 4 mg/ml injection 4 mg  4 mg   • ondansetron (ZOFRAN) syringe/vial injection 4 mg  4 mg    Or   • ondansetron (ZOFRAN ODT) dispertab 4 mg  4 mg   • diphenhydrAMINE (BENADRYL) tablet/capsule 25 mg  25 mg    Or   • diphenhydrAMINE (BENADRYL) injection 25 mg  25 mg   • LR infusion     • PRN oxytocin (PITOCIN) (20 Units/1000 mL) PRN for excessive uterine bleeding - See Admin Instr  125-999 mL/hr   • miSOPROStol (CYTOTEC) tablet 600 mcg  600 mcg   • docusate sodium (COLACE) capsule 100 mg  100 mg   • simethicone (MYLICON) chewable tab 80 mg  80 mg   • prenatal plus vitamin (STUARTNATAL 1+1) 27-1 MG tablet 1 Tab  1 Tab   • magnesium hydroxide (MILK OF MAGNESIA) suspension 30 mL  30 mL   • oxytocin (PITOCIN) infusion (for induction)  0.5-20 [place]-units/min       Exam:  Breast Exam: negative  Abdomen: Abdomen soft, non-tender. BS normal. No masses,  No organomegaly  Fundus Non Tender: yes  Incision: no evidence of infection, separation or keloid formation.  Perineum: perineum intact  Extremity: extremities, peripheral pulses and reflexes normal     Labs:  Recent Labs     08/08/19  1624 08/09/19  1758   WBC 15.2* 20.5*   RBC 4.51 3.85*   HEMOGLOBIN 13.1 11.2*   HEMATOCRIT 39.9 33.9*   MCV 88.5 88.1   MCH 29.0 29.1   MCHC 32.8* 33.0*   RDW 47.4 48.1   PLATELETCT 315 262   MPV 10.0 9.8        Activity:   Discharge to home  Pelvic Rest x 6 weeks    Assessment:  normal postpartum course  Discharge Assessment: No areas of skin breakdown/redness; surgical incision intact/healing     Follow up: [facility] or [facility] in 5 weeks for vaginal ; 1 week for incision check. Will remove staples in 2 days, then f/u 1 wk for incision  check. Pt aware of importance of staple removal in 2 days.      Discharge Meds:   Current Outpatient Medications   Medication Sig Dispense Refill   • ibuprofen (MOTRIN) 600 MG Tab Take 1 Tab by mouth every 6 hours as needed (For cramping after delivery; do not give if patient is receiving ketorolac (Toradol)). 30 Tab 0   • docusate sodium 100 MG Cap Take 100 mg by mouth 2 times a day as needed for Constipation. 60 Cap 0   • oxyCODONE-acetaminophen (PERCOCET) 5-325 MG Tab Take 1 Tab by mouth every four hours as needed for up to 7 days. 15 Tab 0       Kelli Waggoner, P.A.

## 2019-08-13 ENCOUNTER — NON-PROVIDER VISIT (OUTPATIENT)
Dept: OBGYN | Facility: CLINIC | Age: 24
End: 2019-08-13
Payer: MEDICAID

## 2019-08-13 NOTE — NON-PROVIDER
Patient here for staple removal. Had C Section on 8/9/19. Staples were removed. Incision is close, clean and dry. Incision reviewed by Anastasia Adorno CNM, she advised to apply steri strips. Steri strips were applied. Patient to keep appt for friday

## 2019-08-16 ENCOUNTER — POST PARTUM (OUTPATIENT)
Dept: OBGYN | Facility: CLINIC | Age: 24
End: 2019-08-16
Payer: MEDICAID

## 2019-08-16 VITALS — DIASTOLIC BLOOD PRESSURE: 84 MMHG | SYSTOLIC BLOOD PRESSURE: 128 MMHG

## 2019-08-16 DIAGNOSIS — Z98.891 STATUS POST CESAREAN DELIVERY: ICD-10-CM

## 2019-08-16 PROBLEM — O43.119 CIRCUMVALLATE PLACENTA: Status: RESOLVED | Noted: 2019-05-28 | Resolved: 2019-08-16

## 2019-08-16 PROBLEM — Z34.80 SUPERVISION OF OTHER NORMAL PREGNANCY, ANTEPARTUM: Status: RESOLVED | Noted: 2019-01-17 | Resolved: 2019-08-16

## 2019-08-16 PROBLEM — B95.1 GROUP BETA STREP POSITIVE: Status: RESOLVED | Noted: 2019-07-12 | Resolved: 2019-08-16

## 2019-08-16 PROCEDURE — 99024 POSTOP FOLLOW-UP VISIT: CPT | Performed by: OBSTETRICS & GYNECOLOGY

## 2019-08-16 NOTE — PROGRESS NOTES
Incision Check    CC: s/p c/s incision check    HPI: 24 y.o.  s/p  delivery on 19 for failure to progress here for incision check.   Pt reports doing well with minimal pain.  No fevers.  Denies trouble with urination or BM.  Minimal vaginal bleeding.    BFing which is going well.     Denies concerns about mood.   EDPDS: 0    /84   LMP 10/13/2018   Gen: AAO, NAD  Abd: soft, NT, ND, incision C/D/I, healing well    A/P: 24 y.o.  s/p c/s on doing well  - no signs of postop complications  - encouraged BFing, lactation visit prn  - no signs of PP depression  - contraception: discussed options, desires Nexplanon - paperwork and order placed today      RTC for routine postpartum visit in approx 3-4wks

## 2019-08-21 ENCOUNTER — POST PARTUM (OUTPATIENT)
Dept: OBGYN | Facility: CLINIC | Age: 24
End: 2019-08-21
Payer: MEDICAID

## 2019-08-21 ENCOUNTER — TELEPHONE (OUTPATIENT)
Dept: OBGYN | Facility: CLINIC | Age: 24
End: 2019-08-21

## 2019-08-21 VITALS — DIASTOLIC BLOOD PRESSURE: 64 MMHG | WEIGHT: 201 LBS | SYSTOLIC BLOOD PRESSURE: 116 MMHG | BODY MASS INDEX: 32.44 KG/M2

## 2019-08-21 DIAGNOSIS — Z09 POSTOP CHECK: ICD-10-CM

## 2019-08-21 PROCEDURE — 99024 POSTOP FOLLOW-UP VISIT: CPT | Performed by: OBSTETRICS & GYNECOLOGY

## 2019-08-21 NOTE — PROGRESS NOTES
Pt here for c/s check. Pt states she thinks her incision opened. Pt states she has some d/c and blood coming out. Denies redness, hot, fever, chills.   Good phone# [phone]  pharmacy verified with pt.

## 2019-08-21 NOTE — TELEPHONE ENCOUNTER
Pt called states she called her pharmacy to  an Rx but was told that there is not prescriptions sent for her.  I review Dr. Xie's notes, he advised to use Neosporin cream OTC not a specific RX.    Pt notified, has not further questions.

## 2019-08-21 NOTE — PROGRESS NOTES
SUBJECTIVE:  Karlee Vallejo presents to the clinic 2 weeks following C/S , presents for poorly healing incision     Eating a regular diet without difficulty. Bowel movement are Normal.  The patient is not having any pain. Spotting. Patient Denies Incisional pain, drainage or redness    OBJECTIVE:  /64   Wt 91.2 kg (201 lb)   LMP 10/13/2018   BMI 32.44 kg/m²   Current Outpatient Medications on File Prior to Visit   Medication Sig Dispense Refill   • ibuprofen (MOTRIN) 600 MG Tab Take 1 Tab by mouth every 6 hours as needed (For cramping after delivery; do not give if patient is receiving ketorolac (Toradol)). 30 Tab 0   • docusate sodium 100 MG Cap Take 100 mg by mouth 2 times a day as needed for Constipation. 60 Cap 0   • Prenatal Vit-Fe Fumarate-FA (PRENATAL VITAMIN) 27-0.8 MG Tab Take  by mouth.       No current facility-administered medications on file prior to visit.        Constitutional:  alert, no distress.  Abdomen:  soft, bowel sounds active, non-tender.  Incision:  no drainage, no hernia, no seroma, no swelling, right sided , with superficial breakdown at edges , without deep extension  No pus no drainage , slight erythema . , incision well approximated.    IMPRESSION: Doing well postoperatively.  Postoperative course complicated by poor wound healing : appears localized   Needs Neosporin , and to keep dry , and it should heal well . No need for packing , etc     Lab:   Recent Results (from the past 1008 hour(s))   POC UA    Collection Time: 08/08/19  2:54 PM   Result Value Ref Range    POC Color Yellow     POC Appearance Clear     POC Glucose Negative Negative mg/dL    POC Ketones Negative Negative mg/dL    POC Specific Gravity 1.010 1.005 - 1.030    POC Blood Trace-lysed (A) Negative    POC Urine PH 7.0 5.0 - 8.0    POC Protein Negative Negative mg/dL    POC Nitrites Negative Negative    POC Leukocyte Esterase Moderate (A) Negative   Comp Metabolic Panel    Collection Time: 08/08/19  4:24 PM    Result Value Ref Range    Sodium 138 135 - 145 mmol/L    Potassium 3.9 3.6 - 5.5 mmol/L    Chloride 108 96 - 112 mmol/L    Co2 18 (L) 20 - 33 mmol/L    Anion Gap 12.0 (H) 0.0 - 11.9    Glucose 79 65 - 99 mg/dL    Bun 9 8 - 22 mg/dL    Creatinine 0.69 0.50 - 1.40 mg/dL    Calcium 9.3 8.5 - 10.5 mg/dL    AST(SGOT) 12 12 - 45 U/L    ALT(SGPT) 9 2 - 50 U/L    Alkaline Phosphatase 159 (H) 30 - 99 U/L    Total Bilirubin 0.5 0.1 - 1.5 mg/dL    Albumin 3.7 3.2 - 4.9 g/dL    Total Protein 7.1 6.0 - 8.2 g/dL    Globulin 3.4 1.9 - 3.5 g/dL    A-G Ratio 1.1 g/dL   URIC ACID    Collection Time: 08/08/19  4:24 PM   Result Value Ref Range    Uric Acid 6.2 1.9 - 8.2 mg/dL   CBC WITH DIFFERENTIAL    Collection Time: 08/08/19  4:24 PM   Result Value Ref Range    WBC 15.2 (H) 4.8 - 10.8 K/uL    RBC 4.51 4.20 - 5.40 M/uL    Hemoglobin 13.1 12.0 - 16.0 g/dL    Hematocrit 39.9 37.0 - 47.0 %    MCV 88.5 81.4 - 97.8 fL    MCH 29.0 27.0 - 33.0 pg    MCHC 32.8 (L) 33.6 - 35.0 g/dL    RDW 47.4 35.9 - 50.0 fL    Platelet Count 315 164 - 446 K/uL    MPV 10.0 9.0 - 12.9 fL    Neutrophils-Polys 78.70 (H) 44.00 - 72.00 %    Lymphocytes 12.70 (L) 22.00 - 41.00 %    Monocytes 7.70 0.00 - 13.40 %    Eosinophils 0.10 0.00 - 6.90 %    Basophils 0.30 0.00 - 1.80 %    Immature Granulocytes 0.50 0.00 - 0.90 %    Nucleated RBC 0.00 /100 WBC    Neutrophils (Absolute) 11.94 (H) 2.00 - 7.15 K/uL    Lymphs (Absolute) 1.92 1.00 - 4.80 K/uL    Monos (Absolute) 1.16 (H) 0.00 - 0.85 K/uL    Eos (Absolute) 0.02 0.00 - 0.51 K/uL    Baso (Absolute) 0.04 0.00 - 0.12 K/uL    Immature Granulocytes (abs) 0.07 0.00 - 0.11 K/uL    NRBC (Absolute) 0.00 K/uL   ESTIMATED GFR    Collection Time: 08/08/19  4:24 PM   Result Value Ref Range    GFR If African American >60 >60 mL/min/1.73 m 2    GFR If Non African American >60 >60 mL/min/1.73 m 2   Hold Blood Bank Specimen (Not Tested)    Collection Time: 08/08/19  4:24 PM   Result Value Ref Range    Holding Tube - Bb DONE    CBC  without differential    Collection Time: 08/09/19  5:58 PM   Result Value Ref Range    WBC 20.5 (H) 4.8 - 10.8 K/uL    RBC 3.85 (L) 4.20 - 5.40 M/uL    Hemoglobin 11.2 (L) 12.0 - 16.0 g/dL    Hematocrit 33.9 (L) 37.0 - 47.0 %    MCV 88.1 81.4 - 97.8 fL    MCH 29.1 27.0 - 33.0 pg    MCHC 33.0 (L) 33.6 - 35.0 g/dL    RDW 48.1 35.9 - 50.0 fL    Platelet Count 262 164 - 446 K/uL    MPV 9.8 9.0 - 12.9 fL       PLAN:  Continue any current medications.  (See Med List for details.)  Return to clinic  : in 3 week(s).

## 2019-08-26 ENCOUNTER — OFFICE VISIT (OUTPATIENT)
Dept: PEDIATRICS | Facility: CLINIC | Age: 24
End: 2019-08-26
Payer: MEDICAID

## 2019-08-26 DIAGNOSIS — O92.70 LACTATION PROBLEM: ICD-10-CM

## 2019-08-26 DIAGNOSIS — O92.13 CRACKED NIPPLE ASSOCIATED WITH LACTATION: ICD-10-CM

## 2019-08-26 PROCEDURE — 99215 OFFICE O/P EST HI 40 MIN: CPT | Performed by: NURSE PRACTITIONER

## 2019-08-26 ASSESSMENT — ENCOUNTER SYMPTOMS
PSYCHIATRIC NEGATIVE: 1
CONSTITUTIONAL NEGATIVE: 1
RESPIRATORY NEGATIVE: 1

## 2019-08-26 NOTE — PROGRESS NOTES
Subjective:      Karlee Vallejo is a 24 y.o. female who presents with Lactation Services (for soreness and supply))  Baby is here with her          HPI    Concerns:   Latch on difficulties   cracked/bleeding nipples   nipple pain   feeling that there is not enough milk   baby always seems hungry    HPI:   Pertinent  history:  c/section  Mother does not have a history of PCOS, GDM, insulin resistance, hypertension prior to pregnancy, advanced maternal age, multiple gestation thyroid disease. These are common conditions which may interfere in establishing milk supply.  Breast changes in pregnancy Yes    History of breast surgeries No    FEEDING HISTORY:    Past breastfeeding history: first baby   Hospital course: difficulty with latch, hurt in hospital  Currently: has exclusively  since birth with painful nipples, cracked and bleeding. Right nipple beginning to heal so will edgardo or HaaKaa for the other side. Bottle feeds that back to her. Has minimal extra breastmilk  Both breasts  No due to damage  Bottle feeds  All pumped milk  Supplement: Expressed breast milk  Quanity:1.5oz from Haakaa  How given/devices:  Bottle  Nipple Shield Use:   24 mm Medela without success, still hurt   Breast Pumping: Not pumping, using HaaKaa, manual pumping not working well     Review of Systems   Constitutional: Negative.    Respiratory: Negative.    Skin: Negative.    Psychiatric/Behavioral: Negative.           Objective:     LMP 10/13/2018      Physical Exam   Constitutional: She is oriented to person, place, and time. She appears well-developed and well-nourished.   HENT:   Head: Normocephalic.   Neck: Normal range of motion.   Pulmonary/Chest: Effort normal. No breast tenderness, discharge or bleeding. Breasts are symmetrical.   Left nipple at base between 12 and 2 oclock is a superficial  1.5x0.75cm open, no s/s infection. Erythematous without any drainage. No scab  Right nipple has cracks at the base as  well at 9oclock and 12 oclock.        Musculoskeletal: Normal range of motion.   Neurological: She is alert and oriented to person, place, and time.   Skin: Skin is warm and dry. Capillary refill takes less than 2 seconds.   Psychiatric: She has a normal mood and affect. Her behavior is normal. Thought content normal.               Assessment/Plan:   ASSESSMENT /PLAN and LACTATION COUNSELING:     Infant intake at Breast: L    0.8oz   R   1.5oz Total   2.3oz  Pumped: Type of Pump:    Ameda HG    L   10 ml   R  25 ml   Total  35 ml  Initiation of Feeding:  Infant Initiates            Position of Feeding:  Right:      Cross cradle  Left:  Cross cradle  Attachment Achieved:    Rapidly          Nipple shield:       Size:    24mm       Introduced               Latch achieved  Suck Pattern at the breast:   Chewing    Gulping      Behavior Following Observed Feeding:  Content, had fed before coming, I would have offered more food    Latch:   Mom latches independently at home but showed over-correcting for a deeper latch, had less pain but nipple still came out creased  Assisted latch    Suckling/Feeding:  Baby roots, attaches, audible swallows, elicits LILIA, rhythmic, intermittent swallows, frequent pauses, gulps but finishes the breast.  Good color but she is working hard Baby fed effectively  Milk Transfer at this feedin.3ozTOTAL   Milk Supply Available:   Normal but reducing and if continued this path supply would dry up  Low milk supply:   Likely due to    ineffective or infrequent breast stimulation or milk removal,       1. Lactation problem    2. Cracked nipple associated with lactation  Discussed  concerns and symptoms as listed above in assessment and guidance summarized In addition reviewed:   The nature of infants oral structure and function and its impact on latch and transfer of milk.   Triple feeding and its sustainability and its impact on the mother baby relationship  Milk supply is dependent on how  many times the baby removes milk and how well the breasts are emptied in a 24 hour period. This is a biological reality that we can’t work around. If, for any reason, your baby is not latching, or you are not able to nurse, then it is important for you to remove the milk instead by pumping or hand expression.  There’s no magic trick, tea, cookie or supplement that will maintain your milk supply. You must  effectively remove milk to continue to make and maximize your milk. In the early days and weeks that often 10+ times in 24 hours. For older babies, on average 6-7 + times in 24 hours.      General: Feed your baby every 2-3 hours, more often if baby acts hungry. Awaken baby for feeding if going over 3 hours in the day. Need to get in 8-12 feedings per 24 hours. You may allow baby to go longer at night but that generally means shorter durations in the day.    Positioning Techniques for bare breast  Suggested positions  Cross cradle   Fine tune position by   Making sure your fingers beneath the breast as well as your bra, are out of the way of your baby’s chin.    Positioning:  Many positions shown, great sidelying at 7 minutes. See http://globalhealthmedia.org/portfolio-items/positions-for-breastfeeding/?hfzzgwmkxUY=18495     Latch on Techniques for bare breast, modify for nipple shield use  Fine tune latch   By holding your baby more securely at the breast, assisting your baby to stay attached by:  ·     Bringing your baby to your breast, not breast to the baby.  ·     Your baby’s cheek to touch breast securely, nose tipped back.  ·     Hold your baby firmly in place so when your baby forgets to suck and picks it back up again your baby is in the correct spot. You will be extinguishing behavior and replacing it with a deeper latch to stimulate suck and provide satisfaction at the breast  ·     Your baby needs as much breast as deep in the mouth as possible to allow your nipples to heal and for you more importantly  "to maximize efficiency at the breast.  ·     Latch is asymmetrical, leading with the chin, getting more underneath.     Nipple shield. 24mm Medela, before applying, roll shield in on itself to pull breast in to the tip.      Triple feeding A short term solution: Breast/bottle/pump:   FIRST decide what you can do at that feeding and you may decide to double feed - pump and bottle, if you are going to offer the breast at that feeding:  -nurse first and limit time on the breasts to 20+/- min total   -finish with bottle IF needed, it will NOT be needed each time  -pump afterwards to finish emptying your breasts which will help increase milk supply. Pump at least 4 times in 24 hours    Supplement:   Give Expressed Breast Milk as you have been doing. You are perceptive and managing her feeding intake well    Pumping guidelines:  Both breasts using \"Hands-on Pumping\" for 10-15 minutes to stimulate milk production.   4 times in 24 hours  ·     Pumping is effective but can quickly exhaust and overwhelm a new mother  ·       ·     Type of pump:  Hospital grade  http://www.ZALP.[employer]/healthcare-professionals/videos/ZALP-platinum-with-hygienikit-video  ·     Always double pump  ·     How long will vary woman to woman, typically10-15 minutes, or 1-2 minutes after flow slows  ·     Flange Fit: Freely moving nipple in the tunnel with some movement of the areola        Increasing supply besides Galactogogues and Pumping:  ·     Warmth  ·     Relaxation  o     Physical, auditory narratives  o     Childbirth relaxation Techniques  ·     Acupuncture and acupressure  ·     Shoulder Massages  ·     Take a nap   ·     Connect with other mothers  o     Facebook: Janices  o     Forum: Tuesday and Thursday at 11am     Nipple care  May apply breastmilk and or   Moist-oily ointment after feeding/pumping, ie Lanolin nipple butter, coconut or olive oil, if desired/needed 2-3 times/day until nipples are healed.  You do not need to wash " this off before pumping or feeding the baby.   You may want to remove baby from breast when active swallowing stops to avoid prolonged nipple compression.   Soft shells recommended  Pacifiers   AAP Position Paper: Although we recommend a conservative approach regarding pacifier use, we do not endorse a complete ban on the use of pacifiers, nor do we support an approach that induces parental guilt concerning their choices about the use of pacifiers.     FOLLOW UP for weight check and breastfeeding evaluation in     A total of 72    minutes were spent face to face with more than 50% spent counseling and coordinating care as detailed in the above note.

## 2019-08-29 ENCOUNTER — GYNECOLOGY VISIT (OUTPATIENT)
Dept: OBGYN | Facility: CLINIC | Age: 24
End: 2019-08-29
Payer: MEDICAID

## 2019-08-29 VITALS — DIASTOLIC BLOOD PRESSURE: 72 MMHG | BODY MASS INDEX: 32.12 KG/M2 | WEIGHT: 199 LBS | SYSTOLIC BLOOD PRESSURE: 116 MMHG

## 2019-08-29 DIAGNOSIS — O92.29 POSTPARTUM NIPPLE PAIN: Primary | ICD-10-CM

## 2019-08-29 PROCEDURE — 90050 PR POSTPARTUM VISIT: CPT | Performed by: NURSE PRACTITIONER

## 2019-08-29 ASSESSMENT — ENCOUNTER SYMPTOMS
CONSTITUTIONAL NEGATIVE: 1
NEUROLOGICAL NEGATIVE: 1
CARDIOVASCULAR NEGATIVE: 1
RESPIRATORY NEGATIVE: 1
PSYCHIATRIC NEGATIVE: 1
EYES NEGATIVE: 1
GASTROINTESTINAL NEGATIVE: 1
MUSCULOSKELETAL NEGATIVE: 1

## 2019-08-29 NOTE — NON-PROVIDER
Pt here to discuss breast pain.    Pt states she is having sharp constant pain in both breasts, states the pain is worse when she breastfeeds.   Good# [phone]  Pharmacy confirmed

## 2019-08-29 NOTE — PROGRESS NOTES
Subjective:      Karlee Vallejo is a 24 y.o. female who presents with Gynecologic Exam    Karlee had a  section on 19 due to FTP. She is overall feeling well from surgery.  She has been having lactation issues since delivery. Had been told the infant had a tongue tie, but then someone else denied that. She has been exclusively breastfeeding or pumping and feeding, but is having increased nipple pain with latch. Last lactation appt was today, and they are continuing to work with her, but she is here to have nipple sores evaluated.  She denies fever, chills, malaise, or s/sx of mastitis. She did feel like she had a fever on Saturday, but her temp was 99F and she took Ibuprofen with good relief and no return of fever.  She has follow up with pediatrician and lactation next week to evaluate infant and continue to work on latch.    HPI    Review of Systems   Constitutional: Negative.    HENT: Negative.    Eyes: Negative.    Respiratory: Negative.    Cardiovascular: Negative.    Gastrointestinal: Negative.    Genitourinary: Negative.    Musculoskeletal: Negative.    Skin: Negative.    Neurological: Negative.    Endo/Heme/Allergies: Negative.    Psychiatric/Behavioral: Negative.    All other systems reviewed and are negative.         Objective:     /72   Wt 90.3 kg (199 lb)   LMP 10/13/2018   BMI 32.12 kg/m²      Physical Exam   Constitutional: She is oriented to person, place, and time. She appears well-developed and well-nourished.   HENT:   Head: Normocephalic.   Nose: Nose normal.   Eyes: Conjunctivae are normal.   Neck: Normal range of motion.   Cardiovascular: Normal rate, regular rhythm and normal heart sounds.   Pulmonary/Chest: Effort normal and breath sounds normal.   Small abrasions noted bilaterally on nipples. Do not appear infected or oozy.  Recommend Lanolin cream after every feed   Abdominal: Soft. Bowel sounds are normal.   Musculoskeletal: Normal range of motion.   Neurological:  She is alert and oriented to person, place, and time.   Skin: Skin is warm and dry.   Psychiatric: She has a normal mood and affect. Her behavior is normal. Judgment and thought content normal.   Nursing note and vitals reviewed.       Assessment/Plan:     1. Postpartum nipple pain  Breastfeeding pain

## 2019-09-04 ENCOUNTER — OFFICE VISIT (OUTPATIENT)
Dept: PEDIATRICS | Facility: CLINIC | Age: 24
End: 2019-09-04
Payer: MEDICAID

## 2019-09-04 DIAGNOSIS — O92.70 LACTATION PROBLEM: ICD-10-CM

## 2019-09-04 DIAGNOSIS — O92.13 CRACKED NIPPLE ASSOCIATED WITH LACTATION: ICD-10-CM

## 2019-09-04 PROCEDURE — 99215 OFFICE O/P EST HI 40 MIN: CPT | Performed by: NURSE PRACTITIONER

## 2019-09-04 ASSESSMENT — ENCOUNTER SYMPTOMS: CONSTITUTIONAL NEGATIVE: 1

## 2019-09-04 NOTE — PROGRESS NOTES
"Subjective:      Karlee Valeljo is a 24 y.o. female who presents for  Lactation Services  This is a subsequent visit..  Her baby and Karlee's mom are with her today.    CC: Nipples healing, baby growth, supply sufficient    History since last visit on 19. Baby hosptitalized for BRUE abnormal breathing. Destat with feedings especially the bottle.  Seen my two IBCLCs.  Mom continued to pump and began to nurse on the right breast, nipple still coming out creased but lanolin before pumping in the damaged opening facilitated healing. Used neosporin per her PCP on the left cracked nipple, washing it before pumping. Fed back what she could get out on the Haakaa and freezing what she gets from pumping 8x/day.        Review of Systems   Constitutional: Negative.    Skin: Negative.           Objective:     LMP 10/13/2018      Physical Exam   Constitutional: She appears well-nourished. No distress.   HENT:   Head: Normocephalic.   Neck: Normal range of motion.   Pulmonary/Chest:   Breasts soft, nipples healing, wounds clean  No s/s plugs or mastitis.  Right nipple has an open wound 1 cm moon shaped at 7-8pm at the base of the nipple at the breast  Left nipple has a scab across the face of the nipple above midline of the nipple, non erythematous, no s/s infection.               Assessment/Plan/Counselin. Lactation problem  Mom has been pumping on the left breast and just returned to feeding yesterday without pain.  She has been pumping and feeding that back  Infant to breast, right side and removed 1.2oz. Moved to left breast, independently latching and only removed 0.2oz then 0.0 ounces.  Back to right breast in football and removed 0.4oz.  Total feeding 1.8oz.   Mom pumped after, infant had fed earlier and accounts for limited amounts.   Will continue breastfeeding, pumping after and decrease one pumping per day after a \"good\" feeding to  Start weaning off pump but allow for good supply.       2. Cracked " nipple associated with lactation  Healing well. May d/c neosporin and mix olive or coconut oil with the lansinoh for comfort and healing. May limit feedings on the left breast until comfortable with healing.  The right nipple base still healing but mom comfortable with nursing. Using football the nipple came out less creased and may provide a better position for healing.      FOLLOW UP for infant weight check and breastfeeding evaluation in 10 days    A total of  74   minutes were spent face to face with more than 50% spent counseling and coordinating care as detailed in the above note.

## 2019-09-06 ENCOUNTER — POST PARTUM (OUTPATIENT)
Dept: OBGYN | Facility: CLINIC | Age: 24
End: 2019-09-06
Payer: MEDICAID

## 2019-09-06 PROCEDURE — 0503F POSTPARTUM CARE VISIT: CPT | Performed by: NURSE PRACTITIONER

## 2019-09-06 ASSESSMENT — EDINBURGH POSTNATAL DEPRESSION SCALE (EPDS)
I HAVE BLAMED MYSELF UNNECESSARILY WHEN THINGS WENT WRONG: NOT VERY OFTEN
I HAVE FELT SCARED OR PANICKY FOR NO GOOD REASON: NO, NOT AT ALL
TOTAL SCORE: 5
I HAVE BEEN SO UNHAPPY THAT I HAVE BEEN CRYING: ONLY OCCASIONALLY
I HAVE BEEN SO UNHAPPY THAT I HAVE HAD DIFFICULTY SLEEPING: NOT AT ALL
THINGS HAVE BEEN GETTING ON TOP OF ME: NO, MOST OF THE TIME I HAVE COPED QUITE WELL
I HAVE LOOKED FORWARD WITH ENJOYMENT TO THINGS: AS MUCH AS I EVER DID
I HAVE BEEN ABLE TO LAUGH AND SEE THE FUNNY SIDE OF THINGS: AS MUCH AS I ALWAYS COULD
THE THOUGHT OF HARMING MYSELF HAS OCCURRED TO ME: NEVER
I HAVE FELT SAD OR MISERABLE: NOT VERY OFTEN
I HAVE BEEN ANXIOUS OR WORRIED FOR NO GOOD REASON: HARDLY EVER

## 2019-09-06 ASSESSMENT — ENCOUNTER SYMPTOMS
EYES NEGATIVE: 1
CARDIOVASCULAR NEGATIVE: 1
CONSTITUTIONAL NEGATIVE: 1
PSYCHIATRIC NEGATIVE: 1
GASTROINTESTINAL NEGATIVE: 1
RESPIRATORY NEGATIVE: 1
NEUROLOGICAL NEGATIVE: 1
MUSCULOSKELETAL NEGATIVE: 1

## 2019-09-06 NOTE — PROGRESS NOTES
Post Partum Exam  BP  110/70    [phone]  Pt is breast feeding exclusively  Delivery - C Section, 8/9/19  Pt is has appt for Implanon placement Tues 9/10/19

## 2019-09-06 NOTE — PROGRESS NOTES
Subjective:    Karlee Vallejo is a 24 y.o. female who presents for her postpartum exam 4 weeks following primary  section for fetal intolerance. Her prenatal course was uncomplicated. She denies dysuria, vaginal bleeding, odor, itching or breast problems. She is breastfeeding. She desires an nexplanon for her birth control method and has appt for placement on 9/10/19. Reports no sex prior to this appointment. Eating a regular diet without difficulty. Bowel movement are Normal.  The patient is not having any pain. Spotting. Patient Denies Incisional pain, drainage or redness. Patient denies any s/sx of postpartum depression. EPDS score is 5.    Problem List     Patient Active Problem List    Diagnosis Date Noted   • Lactation problem 2019   • Cracked nipple associated with lactation 2019   • Tubal pregnancy without intrauterine pregnancy- s/p MTX (2017) 2017       Objective    See PE  Lab: H&H at d/c: 11.2/33.9  LMP 10/13/2018     Assessment:    1. PP care of lactating women   2. Exam WNL   3. Pap WNL on 2019  4. Desires contraception- nexplanon    Plan:    1. Breastfeeding support   2. Continue PNV   3. Contraceptive counseling - follow up w health dept,  [facility], or [facility] for contraceptive changes, future pregnancy, or other women's health care needs  4. Encouraged condom use for STI and pregnancy prevention  5. Discussed diet, exercise and resumption of sexual activity   6. Preconception guidance for next pregnancy if applicable. Discussed c/s-TOLAC risk factors. Folic acid for all women of childbearing age.     HPI    Review of Systems   Constitutional: Negative.    HENT: Negative.    Eyes: Negative.    Respiratory: Negative.    Cardiovascular: Negative.    Gastrointestinal: Negative.    Genitourinary: Negative.    Musculoskeletal: Negative.    Skin: Negative.    Neurological: Negative.    Endo/Heme/Allergies: Negative.    Psychiatric/Behavioral: Negative.    All  other systems reviewed and are negative.         Objective:     LMP 10/13/2018      Physical Exam   Constitutional: She is oriented to person, place, and time. She appears well-developed and well-nourished.   HENT:   Head: Normocephalic.   Nose: Nose normal.   Eyes: Conjunctivae are normal.   Neck: Normal range of motion.   Cardiovascular: Normal rate, regular rhythm and normal heart sounds.   Pulmonary/Chest: Effort normal and breath sounds normal.   Abdominal: Soft.   Genitourinary: Vagina normal and uterus normal.   Musculoskeletal: Normal range of motion.   Neurological: She is alert and oriented to person, place, and time.   Skin: Skin is warm and dry.   Psychiatric: She has a normal mood and affect. Her behavior is normal. Judgment and thought content normal.   Nursing note and vitals reviewed.       Assessment/Plan:     1. Postpartum care and examination of lactating mother  C/s 8/9/19

## 2019-09-10 ENCOUNTER — GYNECOLOGY VISIT (OUTPATIENT)
Dept: OBGYN | Facility: CLINIC | Age: 24
End: 2019-09-10
Payer: MEDICAID

## 2019-09-10 VITALS — BODY MASS INDEX: 31.96 KG/M2 | WEIGHT: 198 LBS | SYSTOLIC BLOOD PRESSURE: 100 MMHG | DIASTOLIC BLOOD PRESSURE: 60 MMHG

## 2019-09-10 DIAGNOSIS — Z30.017 INSERTION OF NEXPLANON: ICD-10-CM

## 2019-09-10 LAB
INT CON NEG: NEGATIVE
INT CON POS: POSITIVE
POC URINE PREGNANCY TEST: NEGATIVE

## 2019-09-10 PROCEDURE — 11981 INSERTION DRUG DLVR IMPLANT: CPT | Performed by: OBSTETRICS & GYNECOLOGY

## 2019-09-10 PROCEDURE — 81025 URINE PREGNANCY TEST: CPT | Performed by: OBSTETRICS & GYNECOLOGY

## 2019-09-10 NOTE — NON-PROVIDER
Pt here for Nexplanon Insertion  Pt states no complaints  Good#[phone]  Pharmacy verified  UPT Negative

## 2019-09-10 NOTE — PROCEDURES
Procedure note; Nexplanon insertion;    Informed consent obtained, consent signed-discussed the risks of Nexplanon; pain, bleeding, infection, bruising, possible pregnancy, difficulty with removing implant, possible scarring to arm    Urine hCG negative    Patient positioned supine with nondominant arm exposed.    Medial epicondyle of left arm palpated    Surgical sb placed 8-10 cm medial to the medial epicondyle    Betadine prep the skin    Local anesthesia-1% lidocaine injected using 2 inch needle approximately 4cm underneath skin    Implant inserted at a 30 degree angle to the skin    Steri-Strip placed    Pressure bandage placed    Patient instructed to remove gauze in 24 hours    Patient instructed to remove Steri-Strip at 3-5 days    Patient tolerated the procedure well    Followup in 2 weeks for postop checkup

## 2019-09-16 ENCOUNTER — OFFICE VISIT (OUTPATIENT)
Dept: PEDIATRICS | Facility: CLINIC | Age: 24
End: 2019-09-16
Payer: MEDICAID

## 2019-09-16 DIAGNOSIS — O92.13 CRACKED NIPPLE ASSOCIATED WITH LACTATION: ICD-10-CM

## 2019-09-16 DIAGNOSIS — O92.70 LACTATION PROBLEM: ICD-10-CM

## 2019-09-16 PROCEDURE — 99215 OFFICE O/P EST HI 40 MIN: CPT | Performed by: NURSE PRACTITIONER

## 2019-09-16 NOTE — PROGRESS NOTES
Mom is here for a subsequent visit for lactation concerns.  Her baby is with her today.    CC: Managing breastfeeding, healing nipple, wearing baby    History since last visit on 9/4. Mom had noted infant with increasing congestion and coughing. Was started on raNITidine 15 mg/mL (ZANTAC) Syrup  Mom reports baby is less fussy, not waking as frequently, coughing and congestion has decreased.  Offers both breasts, usually only wants to complete one and a sip on the second side. Uses the HaaKaa at night on the right breast to allow for healing, nursing only on the left. Pumps after a HaaKaa time for an additional ounce.  No pain with nursing, exclusive breastrfeeding.     ROS:  Constitutional: Well nourished, no acute distress, no fever, chills. Feeling well  Gastrointestinal: Negative for nausea, vomiting   Breasts: No soreness  of breasts, nipples.   Psychiatric: No mood changes, not experiencing anxiety, is most pleased with improvements overtime        Assessment/Plan and Lactation Counseling    PHYSICAL EXAM  General:  no acute distress  Neurological:  Alert and oriented x3  Breasts:    Symetrical   Full  Plugged Duct - no evidence  Mastitis  - no S/S  Nipples: Intact  On the left, the right nipple still has a healing wound at the base of the nipple at  7pm , 1cm, no erythema, no s/s infection no scabbing or crusting.  Left nipple creases after having been fed on but no pain.  Psychiatric: Normal mood and affect. Her behavior is normal. Judgment and thought content normal     ASSESSMENT /PLAN and LACTATION COUNSELING:     Infant intake at Breast: L   2.3oz    R   M0.3oz Total   2.6oz  Pumped: Type of Pump:  n/a       Initiation of Feeding:  Infant Initiates            Position of Feeding:  Right:     Cross cradle  Left:       Cross cradle  Attachment Achieved:    Rapidly          Nipple shield:  N/A       Suck Pattern at the breast:   Suck burst and normal rest with some chewing but not biting. More   gulping  On  the right side.     Behavior Following Observed Feeding:  Content       Latch:   Mom latches independently.  Suckling/Feeding:  Baby roots, attaches, audible swallows, elicits LILIA, rhythmic,Baby fed effectively  Milk Transfer at this feedinml TOTAL   Milk Supply Available:   Normal to abundant.    Discussed  concerns and symptoms as listed above in assessment and guidance summarized below  Topics reviewed included      The nature of infants oral structure and function and its impact on latch and pain. Baby has improved significantly but still has signs of poor oral function. Is effective at the breast and growing. This will continue to improve over the next 3 months.   Sleep or lack of and strategies to manage restorative sleep, although short amounts, significant to the mental health of the mother. May offer more feeds in the day to see if she can go a little longer at night. Does sleep 4-5 hours once in a row, mom feeds and returns to sleep, catching as many opportunities as possible to sleep - a wise decision.   Progress  Mom is doing exceptionally well with breastfeeding and will continue with current plan that is workable. She will look at wearing baby to allow her more flexibility at home and out at a store. Baby to continue on medication for reflux.    Nipple care    May apply breastmilk and or   Moist-oily ointment after feeding/pumping, ie Lanolin nipple butter, coconut or olive oil, if desired/needed 2-3 times/day until nipples are healed.  You do not need to wash this off before pumping or feeding the baby.   You may want to remove baby from breast when active swallowing stops to avoid prolonged nipple compression.           FOLLOW UP for weight check and breastfeeding evaluation as needed. Encouraged attendance at group.      A total of  48   minutes were spent face to face with more than 50% spent counseling and coordinating care as detailed in the above note.

## 2019-09-24 ENCOUNTER — GYNECOLOGY VISIT (OUTPATIENT)
Dept: OBGYN | Facility: CLINIC | Age: 24
End: 2019-09-24
Payer: MEDICAID

## 2019-09-24 VITALS — BODY MASS INDEX: 31.8 KG/M2 | WEIGHT: 197 LBS | SYSTOLIC BLOOD PRESSURE: 112 MMHG | DIASTOLIC BLOOD PRESSURE: 64 MMHG

## 2019-09-24 DIAGNOSIS — N92.6 IRREGULAR BLEEDING: ICD-10-CM

## 2019-09-24 PROCEDURE — 99213 OFFICE O/P EST LOW 20 MIN: CPT | Performed by: OBSTETRICS & GYNECOLOGY

## 2019-09-24 RX ORDER — TRANEXAMIC ACID 650 MG/1
1300 TABLET ORAL 3 TIMES DAILY
Qty: 30 TAB | Refills: 0 | Status: SHIPPED | OUTPATIENT
Start: 2019-09-24 | End: 2020-10-29

## 2019-09-24 NOTE — NON-PROVIDER
Pt is here today for follow up   Nexplanon inserted on 09/10/19   Pt states that she has not stopped bleeding since she gave birth on 08/09/19    Heavier bleeding since nexplanon insertion.

## 2019-09-24 NOTE — PROGRESS NOTES
Chief Complaint   Patient presents with   • Gynecologic Exam     follow up    Chief complaint: Follow-up Nexplanon      History of present illness: 24 y.o. presents to office for follow-up Nexplanon.  Patient happy Nexplanon but has been having some irregular bleeding.  Using pads and/or tampons since Nexplanon was placed.  Would like to know if there is anything can be done for this.  Denies any pain.      Review of systems:  Pertinent positives documented in HPI and a comprehensive review of system is negative    All PMH, PSH, allergies, social history and FH reviewed and updated today:  Past Medical History:   Diagnosis Date   • Gestational hypertension, third trimester 2019       Past Surgical History:   Procedure Laterality Date   • PB  DELIVERY ONLY N/A 2019    Procedure:  SECTION, PRIMARY;  Surgeon: David Cantor M.D.;  Location: LABOR AND DELIVERY;  Service: Labor and Delivery   • EYE SURGERY  2016    bilateral   • EYE SURGERY         Allergies: No Known Allergies    Social History     Socioeconomic History   • Marital status: Single     Spouse name: Not on file   • Number of children: Not on file   • Years of education: Not on file   • Highest education level: Not on file   Occupational History   • Not on file   Social Needs   • Financial resource strain: Not on file   • Food insecurity:     Worry: Not on file     Inability: Not on file   • Transportation needs:     Medical: Not on file     Non-medical: Not on file   Tobacco Use   • Smoking status: Never Smoker   • Smokeless tobacco: Never Used   Substance and Sexual Activity   • Alcohol use: No   • Drug use: No   • Sexual activity: Yes     Partners: Male     Comment: Planned pregnancy.    Lifestyle   • Physical activity:     Days per week: Not on file     Minutes per session: Not on file   • Stress: Not on file   Relationships   • Social connections:     Talks on phone: Not on file     Gets together: Not on file      Attends Holiness service: Not on file     Active member of club or organization: Not on file     Attends meetings of clubs or organizations: Not on file     Relationship status: Not on file   • Intimate partner violence:     Fear of current or ex partner: Not on file     Emotionally abused: Not on file     Physically abused: Not on file     Forced sexual activity: Not on file   Other Topics Concern   • Not on file   Social History Narrative   • Not on file       No family history on file.    Physical exam:  /64   Wt 89.4 kg (197 lb)     GENERAL APPEARANCE: healthy, alert, no distress  NECK nontender, no masses, thyromegaly or nodules  ABDOMEN Abdomen soft, non-tender. BS normal. No masses,  No organomegaly  FEMALE GYN: Deferred  NEURO Awake, alert and oriented x 3, Normal gait, no sensory deficits  SKIN No rashes, or ulcers or lesions seen  PSYCHIATRIC: Patient shows appropriate affect, is alert and oriented x3, intact judgment and insight.      Assessment:  1. Irregular bleeding         Plan:    Discussed with patient the majority of patients will have stabilization remain endometrium and less unscheduled bleeding after approximately 3 to 6 months.  She reports this is very annoying to her and would like to know if there is anything can be done at this time.    Discussed with the patient birth control pills versus use of tranexamic acid to control unscheduled bleeding.  At this time patient would like to attempt tranexamic acid.    Common side effects tranexamic acid discussed with patient, mainly risk of nausea, vomiting, abdominal pain, diarrhea, DVT.    Patient reports no history of DVT/pulmonary embolism    Patient to use tranexamic acid 1300 mg p.o. 3 times daily for 5 days.  Follow-up in 2 weeks.    If no improvement, may discuss use of birth control pills to attempt to stabilize endometrium.    Discussed with patient that expectant management is also an option and that most women will experience  resolution of this issue within 3 to 6 months.    All questions answered          RX sent electronically after discussion of side effects of medication with risks and benefits.   Questions answered    Spent  15 minutes in face-to-face patient contact in which greater than 50% of that visit was spent in counseling/coordination of care including medical and surgical options of care.

## 2019-10-08 ENCOUNTER — GYNECOLOGY VISIT (OUTPATIENT)
Dept: OBGYN | Facility: CLINIC | Age: 24
End: 2019-10-08
Payer: MEDICAID

## 2019-10-08 VITALS — BODY MASS INDEX: 31.47 KG/M2 | WEIGHT: 195 LBS | SYSTOLIC BLOOD PRESSURE: 114 MMHG | DIASTOLIC BLOOD PRESSURE: 68 MMHG

## 2019-10-08 DIAGNOSIS — Z97.5 BREAKTHROUGH BLEEDING ON NEXPLANON: ICD-10-CM

## 2019-10-08 DIAGNOSIS — N92.1 BREAKTHROUGH BLEEDING ON NEXPLANON: ICD-10-CM

## 2019-10-08 PROCEDURE — 99212 OFFICE O/P EST SF 10 MIN: CPT | Performed by: OBSTETRICS & GYNECOLOGY

## 2019-10-08 RX ORDER — TRANEXAMIC ACID 650 MG/1
1300 TABLET ORAL 3 TIMES DAILY
Qty: 30 TAB | Refills: 1 | Status: SHIPPED | OUTPATIENT
Start: 2019-10-08 | End: 2020-10-29

## 2019-10-08 NOTE — NON-PROVIDER
Pt here to follow up VB w/ nexplanon, states after taking the tranexamic acid the bleeding.   Good # [phone]

## 2019-10-08 NOTE — PROGRESS NOTES
History of present illness: 24 y.o. presents with supervision of Nexplanon.  She states she was previously having breakthrough bleeding on Nexplanon.  She saw Dr. Trujillo who gave her tranexamic acid to take to decrease the bleeding volume.  Patient states she finished the 5-day course and the bleeding has since stopped.    Review of systems:  Pertinent positives documented in HPI and all other systems reviewed & are negative  POBHx:   OB History    Para Term  AB Living   3 1 1   2 1   SAB TAB Ectopic Molar Multiple Live Births   2       0 1      # Outcome Date GA Lbr Isidro/2nd Weight Sex Delivery Anes PTL Lv   3 Term 19 40w0d  3.405 kg (7 lb 8.1 oz) F CS-LTranv Spinal N MENA      Complications: Fetal Intolerance, Failure to Progress in First Stage   2 SAB 2018 5w0d    SAB         Birth Comments: passed on its own   1 SAB 17     ECTOPIC         Birth Comments: methotrexate treatment     All PMH, PSH, allergies, social history and FH reviewed and updated today:  Past Medical History:   Diagnosis Date   • Gestational hypertension, third trimester 2019       Past Surgical History:   Procedure Laterality Date   • PB  DELIVERY ONLY N/A 2019    Procedure:  SECTION, PRIMARY;  Surgeon: David Cantor M.D.;  Location: LABOR AND DELIVERY;  Service: Labor and Delivery   • EYE SURGERY  2016    bilateral   • EYE SURGERY         Allergies: No Known Allergies    Social History     Socioeconomic History   • Marital status: Single     Spouse name: Not on file   • Number of children: Not on file   • Years of education: Not on file   • Highest education level: Not on file   Occupational History   • Not on file   Social Needs   • Financial resource strain: Not on file   • Food insecurity:     Worry: Not on file     Inability: Not on file   • Transportation needs:     Medical: Not on file     Non-medical: Not on file   Tobacco Use   • Smoking status: Never Smoker   • Smokeless  tobacco: Never Used   Substance and Sexual Activity   • Alcohol use: No   • Drug use: No   • Sexual activity: Yes     Partners: Male     Comment: Planned pregnancy.    Lifestyle   • Physical activity:     Days per week: Not on file     Minutes per session: Not on file   • Stress: Not on file   Relationships   • Social connections:     Talks on phone: Not on file     Gets together: Not on file     Attends Scientologist service: Not on file     Active member of club or organization: Not on file     Attends meetings of clubs or organizations: Not on file     Relationship status: Not on file   • Intimate partner violence:     Fear of current or ex partner: Not on file     Emotionally abused: Not on file     Physically abused: Not on file     Forced sexual activity: Not on file   Other Topics Concern   • Not on file   Social History Narrative   • Not on file       No family history on file.    Physical exam:  /68   Wt 88.5 kg (195 lb)     General:appears stated age, is in no apparent distress  Head: normocephalic, non-tender  Neck: neck is supple, no jugular venous distension  Skin: No rashes, or ulcers or lesions seen  Psychiatric: Patient shows appropriate affect, is alert and oriented x3, intact judgment and insight.      Assessment  1. Breakthrough bleeding on Nexplanon         Plan  - Patient was seen for 10 minutes of which > 50% of appointment time was spent on face-to-face counseling and coordination of care regarding the above.  - 24 y.o.  here with breakthrough bleeding on Nexplanon.  Advised the patient breakthrough bleeding and irregular cycles on Nexplanon is common in the first 6 months of use.  Patient given 2 additional courses of tranexamic acid as needed heavy bleeding.  Patient instructed not to smoke or take other hormonal medications while taking this.  Patient also instructed that if she should stop bleeding during the middle of the 5-day course she may stop this course early.  - Follow up  1 year for annual

## 2020-10-28 ENCOUNTER — GYNECOLOGY VISIT (OUTPATIENT)
Dept: OBGYN | Facility: CLINIC | Age: 25
End: 2020-10-28
Payer: MEDICAID

## 2020-10-28 VITALS — DIASTOLIC BLOOD PRESSURE: 72 MMHG | SYSTOLIC BLOOD PRESSURE: 116 MMHG | WEIGHT: 191 LBS | BODY MASS INDEX: 30.83 KG/M2

## 2020-10-28 DIAGNOSIS — Z97.5 NEXPLANON IN PLACE: ICD-10-CM

## 2020-10-28 ASSESSMENT — FIBROSIS 4 INDEX: FIB4 SCORE: 0.35

## 2020-10-28 NOTE — PROGRESS NOTES
N/a  We are missing a device to remove implant so appt is voided today and pt will return tomorrow.

## 2020-10-28 NOTE — NON-PROVIDER
Pt here to discuss Nexplanon removal. Nexplanon inserted on 9/10/19.     Pt states she has been experiencing irregular bleeding with the Nexplanon.    [place]# [phone]  LMP: 10/26/2020  Pharmacy confirmed.

## 2020-10-29 ENCOUNTER — GYNECOLOGY VISIT (OUTPATIENT)
Dept: OBGYN | Facility: CLINIC | Age: 25
End: 2020-10-29
Payer: MEDICAID

## 2020-10-29 VITALS — SYSTOLIC BLOOD PRESSURE: 119 MMHG | BODY MASS INDEX: 30.83 KG/M2 | DIASTOLIC BLOOD PRESSURE: 64 MMHG | WEIGHT: 191 LBS

## 2020-10-29 DIAGNOSIS — Z30.46 ENCOUNTER FOR NEXPLANON REMOVAL: Primary | ICD-10-CM

## 2020-10-29 PROCEDURE — 99395 PREV VISIT EST AGE 18-39: CPT | Performed by: NURSE PRACTITIONER

## 2020-10-29 RX ORDER — LEVONORGESTREL 1.5 MG/1
1.5 TABLET ORAL ONCE
Qty: 10 TAB | Refills: 0 | Status: SHIPPED | OUTPATIENT
Start: 2020-10-29 | End: 2020-10-29

## 2020-10-29 ASSESSMENT — FIBROSIS 4 INDEX: FIB4 SCORE: 0.35

## 2020-10-29 NOTE — PROCEDURES
Procedure note: Nexplanon removal:     Informed consent for removal of Nexplanon is obtained from patient, Risks of the procedure are bleeding, infection, possible difficulty with removing the implant, possible breakage of the implant, possible scarring. Patient is told of the details of the procedure and signs the consent.  The patient is positioned with her left  arm in a 90 degree fashion, the implant is located and palpated.  5cc of 1% Lidocaine is injected subdermally into the area located  Betadine solution is used to cleanse the skin  After proper local anesthesia is obtained, a small incision is made with size 11 scalpel  The implant is worked out of the incision with small curved hemostat and sterile gauze  Wound cleaned and steri-strips placed over the incision   Hemostasis at removal site achieved with pressure. Incision closed with steri strip. A pressure dressing applied and wrapped with self sealing bandage wrap.  Pt instructed to leave the pressure dressing on for 24 hours, and to and leave the steri-strips on for another 24 hours.  Patient tolerated the procedure well.     Watch for signs and symptoms of infection, redness at the site, purulent drainage.

## 2020-10-29 NOTE — PROGRESS NOTES
Pt reports for implant removal. She had it inserted in 9/2019 and has been having irregular bleeding since then. She can bleed for a month and then stop for a few days and then bleed for a week and then stop briefly and back to bleeding for a month. She has tried TXA  shortly after the implant was placed and then a month later as well but since then she has continued with the irregular bleeding and is sick of it and does not want to try any other strategies to control the bleeding.   She desires to use condoms after removal   Rx sent for Plan B but reviewed not taking it repetitively and if she is having issues with condoms not working correctly we would like to discuss another method    See procedure note  RTC for annual gyne exam or PRN

## 2021-02-17 ENCOUNTER — TELEPHONE (OUTPATIENT)
Dept: SCHEDULING | Facility: IMAGING CENTER | Age: 26
End: 2021-02-17

## 2021-03-17 ENCOUNTER — TELEPHONE (OUTPATIENT)
Dept: SCHEDULING | Facility: IMAGING CENTER | Age: 26
End: 2021-03-17

## 2021-04-12 ENCOUNTER — HOSPITAL ENCOUNTER (OUTPATIENT)
Dept: LAB | Facility: MEDICAL CENTER | Age: 26
End: 2021-04-12
Attending: PHYSICIAN ASSISTANT
Payer: MEDICAID

## 2021-04-12 ENCOUNTER — OFFICE VISIT (OUTPATIENT)
Dept: MEDICAL GROUP | Facility: MEDICAL CENTER | Age: 26
End: 2021-04-12
Attending: PHYSICIAN ASSISTANT
Payer: MEDICAID

## 2021-04-12 VITALS
WEIGHT: 203.2 LBS | RESPIRATION RATE: 16 BRPM | DIASTOLIC BLOOD PRESSURE: 60 MMHG | TEMPERATURE: 96.7 F | HEIGHT: 66 IN | OXYGEN SATURATION: 97 % | SYSTOLIC BLOOD PRESSURE: 113 MMHG | BODY MASS INDEX: 32.66 KG/M2 | HEART RATE: 66 BPM

## 2021-04-12 DIAGNOSIS — Z00.00 HEALTH CARE MAINTENANCE: ICD-10-CM

## 2021-04-12 DIAGNOSIS — Z11.3 SCREENING EXAMINATION FOR SEXUALLY TRANSMITTED DISEASE: ICD-10-CM

## 2021-04-12 DIAGNOSIS — Z00.00 ANNUAL PHYSICAL EXAM: ICD-10-CM

## 2021-04-12 LAB
ALBUMIN SERPL BCP-MCNC: 4.6 G/DL (ref 3.2–4.9)
ALBUMIN/GLOB SERPL: 1.4 G/DL
ALP SERPL-CCNC: 79 U/L (ref 30–99)
ALT SERPL-CCNC: 12 U/L (ref 2–50)
ANION GAP SERPL CALC-SCNC: 8 MMOL/L (ref 7–16)
AST SERPL-CCNC: 12 U/L (ref 12–45)
BILIRUB SERPL-MCNC: 0.2 MG/DL (ref 0.1–1.5)
BUN SERPL-MCNC: 12 MG/DL (ref 8–22)
CALCIUM SERPL-MCNC: 9.5 MG/DL (ref 8.5–10.5)
CHLORIDE SERPL-SCNC: 103 MMOL/L (ref 96–112)
CO2 SERPL-SCNC: 26 MMOL/L (ref 20–33)
CREAT SERPL-MCNC: 0.68 MG/DL (ref 0.5–1.4)
ERYTHROCYTE [DISTWIDTH] IN BLOOD BY AUTOMATED COUNT: 43.5 FL (ref 35.9–50)
EST. AVERAGE GLUCOSE BLD GHB EST-MCNC: 108 MG/DL
GLOBULIN SER CALC-MCNC: 3.2 G/DL (ref 1.9–3.5)
GLUCOSE SERPL-MCNC: 85 MG/DL (ref 65–99)
HBA1C MFR BLD: 5.4 % (ref 4–5.6)
HCT VFR BLD AUTO: 44.4 % (ref 37–47)
HCV AB SER QL: NORMAL
HGB BLD-MCNC: 14.3 G/DL (ref 12–16)
HIV 1+2 AB+HIV1 P24 AG SERPL QL IA: NORMAL
MCH RBC QN AUTO: 29.6 PG (ref 27–33)
MCHC RBC AUTO-ENTMCNC: 32.2 G/DL (ref 33.6–35)
MCV RBC AUTO: 91.9 FL (ref 81.4–97.8)
PLATELET # BLD AUTO: 337 K/UL (ref 164–446)
PMV BLD AUTO: 9.8 FL (ref 9–12.9)
POTASSIUM SERPL-SCNC: 4.4 MMOL/L (ref 3.6–5.5)
PROT SERPL-MCNC: 7.8 G/DL (ref 6–8.2)
RBC # BLD AUTO: 4.83 M/UL (ref 4.2–5.4)
SODIUM SERPL-SCNC: 137 MMOL/L (ref 135–145)
TREPONEMA PALLIDUM IGG+IGM AB [PRESENCE] IN SERUM OR PLASMA BY IMMUNOASSAY: NORMAL
TSH SERPL DL<=0.005 MIU/L-ACNC: 1.28 UIU/ML (ref 0.38–5.33)
WBC # BLD AUTO: 7 K/UL (ref 4.8–10.8)

## 2021-04-12 PROCEDURE — 85027 COMPLETE CBC AUTOMATED: CPT

## 2021-04-12 PROCEDURE — 86803 HEPATITIS C AB TEST: CPT

## 2021-04-12 PROCEDURE — 99395 PREV VISIT EST AGE 18-39: CPT | Performed by: PHYSICIAN ASSISTANT

## 2021-04-12 PROCEDURE — 82306 VITAMIN D 25 HYDROXY: CPT

## 2021-04-12 PROCEDURE — 83036 HEMOGLOBIN GLYCOSYLATED A1C: CPT

## 2021-04-12 PROCEDURE — 36415 COLL VENOUS BLD VENIPUNCTURE: CPT

## 2021-04-12 PROCEDURE — 87491 CHLMYD TRACH DNA AMP PROBE: CPT

## 2021-04-12 PROCEDURE — 87591 N.GONORRHOEAE DNA AMP PROB: CPT

## 2021-04-12 PROCEDURE — 84443 ASSAY THYROID STIM HORMONE: CPT

## 2021-04-12 PROCEDURE — 99203 OFFICE O/P NEW LOW 30 MIN: CPT | Performed by: PHYSICIAN ASSISTANT

## 2021-04-12 PROCEDURE — 99213 OFFICE O/P EST LOW 20 MIN: CPT | Performed by: PHYSICIAN ASSISTANT

## 2021-04-12 PROCEDURE — 87389 HIV-1 AG W/HIV-1&-2 AB AG IA: CPT

## 2021-04-12 PROCEDURE — 80053 COMPREHEN METABOLIC PANEL: CPT

## 2021-04-12 PROCEDURE — 86780 TREPONEMA PALLIDUM: CPT

## 2021-04-12 RX ORDER — COVID-19 MOLECULAR TEST ASSAY
KIT MISCELLANEOUS
COMMUNITY
Start: 2021-04-06 | End: 2021-04-12

## 2021-04-12 ASSESSMENT — FIBROSIS 4 INDEX: FIB4 SCORE: 0.37

## 2021-04-12 ASSESSMENT — PATIENT HEALTH QUESTIONNAIRE - PHQ9: CLINICAL INTERPRETATION OF PHQ2 SCORE: 0

## 2021-04-12 NOTE — PROGRESS NOTES
Subjective:     CC:   Chief Complaint   Patient presents with   • Establish Care     annual check up     HPI:   Karlee Vallejo is a 26 y.o. female who presents for annual exam. She is feeling well and denies any complaints.    Ob-Gyn/ History:    Patient has GYN provider: No.   /Para: .  Last Pap Smear:  2 years ago. No history of abnormal pap smears.  Gyn Surgery:  ..  Current Contraceptive Method: None. Yes, currently sexually active.  Last menstrual period: 21.  Periods regular. light, moderate bleeding. No cramping.   No significant bloating/fluid retention, pelvic pain, or dyspareunia. No vaginal discharge.  Folate intake: Yes, currently on prenatals.     Health Maintenance  Advanced directive: None.   Osteoporosis Screen/ DEXA: None.   PT/vit D for falls prevention: None.   Cholesterol Screening: N/A.   Diabetes Screening: N/A.   Diet: Unbalanced.   Exercise: 40 minutes daily.   Substance Abuse: None.   Safe in relationship.  Seat belts, bike helmet, gun safety discussed.  Sun protection used.    Cancer screening  Colorectal Cancer Screening: N/A.   Lung Cancer Screening: N/A.  Cervical Cancer Screening: UTD. Due next 2022.  Breast Cancer Screening: N/A.     Infectious disease screening/Immunizations  --STI Screening: Ordered today.   --Practices safe sex.  --HIV Screening: Ordered today.   --Hepatitis C Screening: Ordered today.   --Immunizations:    Influenza: Deferred for this year.    HPV:  UTD.    Tetanus: UTD.    Shingles: N/A.   Pneumococcal: N/A.   Other immunizations: N/A.     She  has a past medical history of Gestational hypertension, third trimester (2019).  She  has a past surgical history that includes pr  delivery only (N/A, 2019); eye surgery (2016); and eye surgery ().    Family History   Problem Relation Age of Onset   • Cancer Maternal Grandfather         brain tumor   • Lung Disease Neg Hx    • Hypertension Neg Hx    •  Hyperlipidemia Neg Hx    • Diabetes Neg Hx    • Heart Disease Neg Hx        Social History     Socioeconomic History   • Marital status: Single     Spouse name: Not on file   • Number of children: Not on file   • Years of education: Not on file   • Highest education level: Not on file   Occupational History   • Not on file   Tobacco Use   • Smoking status: Never Smoker   • Smokeless tobacco: Never Used   Substance and Sexual Activity   • Alcohol use: No   • Drug use: No   • Sexual activity: Yes     Partners: Male   Other Topics Concern   • Not on file   Social History Narrative   • Not on file     Social Determinants of Health     Financial Resource Strain:    • Difficulty of Paying Living Expenses:    Food Insecurity:    • Worried About Running Out of Food in the Last Year:    • Ran Out of Food in the Last Year:    Transportation Needs:    • Lack of Transportation (Medical):    • Lack of Transportation (Non-Medical):    Physical Activity:    • Days of Exercise per Week:    • Minutes of Exercise per Session:    Stress:    • Feeling of Stress :    Social Connections:    • Frequency of Communication with Friends and Family:    • Frequency of Social Gatherings with Friends and Family:    • Attends Scientologist Services:    • Active Member of Clubs or Organizations:    • Attends Club or Organization Meetings:    • Marital Status:    Intimate Partner Violence:    • Fear of Current or Ex-Partner:    • Emotionally Abused:    • Physically Abused:    • Sexually Abused:        Patient Active Problem List    Diagnosis Date Noted   • Irregular bleeding 09/24/2019   • Tubal pregnancy without intrauterine pregnancy- s/p MTX (1/13/2017) 01/18/2017         Current Outpatient Medications   Medication Sig Dispense Refill   • Prenatal Vit-Fe Fumarate-FA (PRENATAL VITAMIN) 27-0.8 MG Tab Take  by mouth.       No current facility-administered medications for this visit.     No Known Allergies    Review of Systems   Constitutional: Negative  "for fever, chills and malaise/fatigue.   HENT: Negative for congestion.    Eyes: Negative for pain.   Respiratory: Negative for cough and shortness of breath.    Cardiovascular: Negative for leg swelling.   Gastrointestinal: Negative for nausea, vomiting, abdominal pain and diarrhea.   Genitourinary: Negative for dysuria and hematuria.   Skin: Negative for rash.   Neurological: Negative for dizziness, focal weakness and headaches.   Endo/Heme/Allergies: Does not bruise/bleed easily.   Psychiatric/Behavioral: Negative for depression.  The patient is not nervous/anxious.      Objective:     /60 (BP Location: Right arm, Patient Position: Sitting, BP Cuff Size: Adult long)   Pulse 66   Temp 35.9 °C (96.7 °F) (Temporal)   Resp 16   Ht 1.676 m (5' 6\")   Wt 92.2 kg (203 lb 3.2 oz)   SpO2 97%   BMI 32.80 kg/m²   Body mass index is 32.8 kg/m².  Wt Readings from Last 4 Encounters:   04/12/21 92.2 kg (203 lb 3.2 oz)   10/29/20 86.6 kg (191 lb)   10/28/20 86.6 kg (191 lb)   10/08/19 88.5 kg (195 lb)       Physical Exam:  Constitutional: Well-developed and well-nourished. Not diaphoretic. No distress.   Skin: Skin is warm and dry. No rash noted.  Head: Atraumatic without lesions.  Eyes: Conjunctivae and extraocular motions are normal. Pupils are equal, round, and reactive to light. No scleral icterus.   Ears:  External ears unremarkable. Tympanic membranes clear and intact.  Nose: Nares patent. Septum midline. Turbinates without erythema nor edema. No discharge.   Mouth/Throat: Dentition is normal. Tongue normal. Oropharynx is clear and moist. Posterior pharynx without erythema or exudates.  Neck: Supple, trachea midline. Normal range of motion. No thyromegaly present. No lymphadenopathy--cervical or supraclavicular.  Cardiovascular: Regular rate and rhythm, S1 and S2 without murmur, rubs, or gallops.  Lungs: Normal inspiratory effort, CTA bilaterally, no wheezes/rhonchi/rales  Abdomen: Soft, non tender, and without " distention. Active bowel sounds in all four quadrants. No rebound, guarding, masses or HSM.  Extremities: No cyanosis, clubbing, erythema, nor edema. Distal pulses intact and symmetric.   Musculoskeletal: All major joints AROM full in all directions without pain.  Neurological: Alert and oriented x 3. DTRs 2+/3 and symmetric. No cranial nerve deficit. 5/5 myotomes. Sensation intact. Negative Rhomberg.  Psychiatric:  Behavior, mood, and affect are appropriate.    Assessment and Plan:     1. Annual physical exam     2. Health care maintenance  CBC WITHOUT DIFFERENTIAL    Comp Metabolic Panel    HEMOGLOBIN A1C    TSH WITH REFLEX TO FT4    VITAMIN D,25 HYDROXY   3. Screening examination for sexually transmitted disease  CHLAMYDIA/GC PCR URINE OR SWAB    HEP C VIRUS ANTIBODY    HIV AG/AB COMBO ASSAY SCREENING    RPR (SYPHILIS)       HCM:   Labs per orders.  Immunizations UTD. Scheduled to receive COVID-19 vaccine.   Patient counseled about skin care, diet, supplements, prenatal vitamins, safe sex and exercise.    Follow-up: Return if symptoms worsen or fail to improve.

## 2021-04-14 LAB — 25(OH)D3 SERPL-MCNC: 31 NG/ML (ref 30–80)

## 2021-10-13 ENCOUNTER — GYNECOLOGY VISIT (OUTPATIENT)
Dept: OBGYN | Facility: CLINIC | Age: 26
End: 2021-10-13
Payer: MEDICAID

## 2021-10-13 VITALS — DIASTOLIC BLOOD PRESSURE: 90 MMHG | SYSTOLIC BLOOD PRESSURE: 132 MMHG | WEIGHT: 209 LBS | BODY MASS INDEX: 33.73 KG/M2

## 2021-10-13 DIAGNOSIS — N93.8 DUB (DYSFUNCTIONAL UTERINE BLEEDING): Primary | ICD-10-CM

## 2021-10-13 LAB
INT CON NEG: NORMAL
INT CON POS: NORMAL
POC URINE PREGNANCY TEST: POSITIVE

## 2021-10-13 PROCEDURE — 81025 URINE PREGNANCY TEST: CPT | Performed by: OBSTETRICS & GYNECOLOGY

## 2021-10-13 PROCEDURE — 99213 OFFICE O/P EST LOW 20 MIN: CPT | Mod: 25 | Performed by: OBSTETRICS & GYNECOLOGY

## 2021-10-13 PROCEDURE — 76830 TRANSVAGINAL US NON-OB: CPT | Performed by: OBSTETRICS & GYNECOLOGY

## 2021-10-13 ASSESSMENT — FIBROSIS 4 INDEX: FIB4 SCORE: 0.25

## 2021-10-13 NOTE — PROGRESS NOTES
Chief complaint;  This patient is a 26 y.o. female , Patient's last menstrual period was 2021 (approximate). presents complaining of dysfunctional uterine bleeding.    Patient has a history of previous ectopic pregnancy  She is not currently having any pelvic pain but did have some spotting yesterday    Review of systems-denies; chest pain, shortness of breath, fever, chills, abdominal pain  Past OB history-  OB History    Para Term  AB Living   3 1 1   2 1   SAB TAB Ectopic Molar Multiple Live Births   2       0 1      # Outcome Date GA Lbr Isidro/2nd Weight Sex Delivery Anes PTL Lv   3 Term 19 40w0d  3.405 kg (7 lb 8.1 oz) F CS-LTranv Spinal N MENA      Complications: Fetal Intolerance, Failure to Progress in First Stage   2 SAB 2018 5w0d    SAB         Birth Comments: passed on its own   1 SAB 17     ECTOPIC         Birth Comments: methotrexate treatment     Past surgical history-   Past Surgical History:   Procedure Laterality Date   • PB  DELIVERY ONLY N/A 2019    Procedure:  SECTION, PRIMARY;  Surgeon: David Cantor M.D.;  Location: LABOR AND DELIVERY;  Service: Labor and Delivery   • EYE SURGERY  2016    bilateral, lasix   • EYE SURGERY       Past medical history-   Past Medical History:   Diagnosis Date   • Gestational hypertension, third trimester 2019     Allergies- Patient has no known allergies.  Present medications-   Outpatient Encounter Medications as of 10/13/2021   Medication Sig Dispense Refill   • Prenatal Vit-Fe Fumarate-FA (PRENATAL VITAMIN) 27-0.8 MG Tab Take  by mouth.       No facility-administered encounter medications on file as of 10/13/2021.     Family history- no history of breast or ovarian cancer  Social history-   Social History     Socioeconomic History   • Marital status: Single     Spouse name: Not on file   • Number of children: Not on file   • Years of education: Not on file   • Highest education level: Not  on file   Occupational History   • Not on file   Tobacco Use   • Smoking status: Never Smoker   • Smokeless tobacco: Never Used   Vaping Use   • Vaping Use: Never used   Substance and Sexual Activity   • Alcohol use: No   • Drug use: No   • Sexual activity: Yes     Partners: Male   Other Topics Concern   • Not on file   Social History Narrative   • Not on file     Social Determinants of Health     Financial Resource Strain:    • Difficulty of Paying Living Expenses:    Food Insecurity:    • Worried About Running Out of Food in the Last Year:    • Ran Out of Food in the Last Year:    Transportation Needs:    • Lack of Transportation (Medical):    • Lack of Transportation (Non-Medical):    Physical Activity:    • Days of Exercise per Week:    • Minutes of Exercise per Session:    Stress:    • Feeling of Stress :    Social Connections:    • Frequency of Communication with Friends and Family:    • Frequency of Social Gatherings with Friends and Family:    • Attends Roman Catholic Services:    • Active Member of Clubs or Organizations:    • Attends Club or Organization Meetings:    • Marital Status:    Intimate Partner Violence:    • Fear of Current or Ex-Partner:    • Emotionally Abused:    • Physically Abused:    • Sexually Abused:          Physical examination;   Alert and oriented x3  General-well-developed well-nourished female in no apparent distress  Vitals:    10/13/21 1105   BP: 132/90   BP Location: Right arm   Patient Position: Sitting   Weight: 94.8 kg (209 lb)     Skin is warm and dry   HEENT-normocephalic,nontraumatic,PERRLA,EOMI  Throat- no edema or erythema  Neck-supple  Cardiovascular-regular rate and rhythm, normal S1-S2 without murmurs or gallops  Lungs-clear to auscultation bilaterally  Back-negative CVA tenderness  Abdomen-nondistended positive bowel sounds soft nontender without masses or hepatosplenomegaly  Pelvic examination;  External genitalia-without lesions  Vagina-no blood, no  discharge  Cervix-closed,no gross lesions  Uterus-  6 weeks size, nontender  Adnexa- without mass or tenderness  Extremities-without cyanosis clubbing or edema  Neurologic-grossly intact    Transvaginal ultrasound; as performed and read by myself; intrauterine gestational sac containing very small fetal pole   cardiac motion identified at 107 bpm, no yolk sac identified  Crown-rump length consistent with 5 weeks 6 days    Impression;  Dysfunctional uterine bleeding-cannot completely confirm viable pregnancy at this time  Unlikely to be tubal pregnancy due to gestational sac seen in uterus    Plan;   Miscarriage and ectopic precautions discussed with patient and her partner  Repeat DU B visit with me in 13 days on 26 October  Miscarriage precautions given  Continue prenatal vitamins

## 2021-10-13 NOTE — NON-PROVIDER
Pt here today for   Pt states some vaginal bleeding spotting yesterday. Some nausea no Vomiting. Is taking prenatals.   Good # [phone]   Pharmacy Confirmed.  UPT positive

## 2021-10-18 ENCOUNTER — APPOINTMENT (OUTPATIENT)
Dept: RADIOLOGY | Facility: MEDICAL CENTER | Age: 26
End: 2021-10-18
Attending: EMERGENCY MEDICINE
Payer: MEDICAID

## 2021-10-18 ENCOUNTER — HOSPITAL ENCOUNTER (EMERGENCY)
Facility: MEDICAL CENTER | Age: 26
End: 2021-10-18
Attending: EMERGENCY MEDICINE
Payer: MEDICAID

## 2021-10-18 ENCOUNTER — TELEPHONE (OUTPATIENT)
Dept: OBGYN | Facility: CLINIC | Age: 26
End: 2021-10-18

## 2021-10-18 VITALS
SYSTOLIC BLOOD PRESSURE: 115 MMHG | HEIGHT: 66 IN | HEART RATE: 58 BPM | BODY MASS INDEX: 34.12 KG/M2 | WEIGHT: 212.3 LBS | OXYGEN SATURATION: 98 % | DIASTOLIC BLOOD PRESSURE: 69 MMHG | RESPIRATION RATE: 16 BRPM | TEMPERATURE: 98.6 F

## 2021-10-18 DIAGNOSIS — O03.4 INCOMPLETE MISCARRIAGE: ICD-10-CM

## 2021-10-18 LAB
ALBUMIN SERPL BCP-MCNC: 4.3 G/DL (ref 3.2–4.9)
ALBUMIN/GLOB SERPL: 1.5 G/DL
ALP SERPL-CCNC: 77 U/L (ref 30–99)
ALT SERPL-CCNC: 12 U/L (ref 2–50)
ANION GAP SERPL CALC-SCNC: 11 MMOL/L (ref 7–16)
APPEARANCE UR: CLEAR
AST SERPL-CCNC: 11 U/L (ref 12–45)
B-HCG SERPL-ACNC: 3896 MIU/ML (ref 0–5)
BACTERIA #/AREA URNS HPF: ABNORMAL /HPF
BASOPHILS # BLD AUTO: 0.7 % (ref 0–1.8)
BASOPHILS # BLD: 0.05 K/UL (ref 0–0.12)
BILIRUB SERPL-MCNC: 0.3 MG/DL (ref 0.1–1.5)
BILIRUB UR QL STRIP.AUTO: NEGATIVE
BUN SERPL-MCNC: 11 MG/DL (ref 8–22)
CALCIUM SERPL-MCNC: 9.2 MG/DL (ref 8.5–10.5)
CAOX CRY #/AREA URNS HPF: ABNORMAL /HPF
CHLORIDE SERPL-SCNC: 107 MMOL/L (ref 96–112)
CO2 SERPL-SCNC: 22 MMOL/L (ref 20–33)
COLOR UR: YELLOW
CREAT SERPL-MCNC: 0.66 MG/DL (ref 0.5–1.4)
EOSINOPHIL # BLD AUTO: 0.12 K/UL (ref 0–0.51)
EOSINOPHIL NFR BLD: 1.6 % (ref 0–6.9)
EPI CELLS #/AREA URNS HPF: ABNORMAL /HPF
ERYTHROCYTE [DISTWIDTH] IN BLOOD BY AUTOMATED COUNT: 43 FL (ref 35.9–50)
GLOBULIN SER CALC-MCNC: 2.9 G/DL (ref 1.9–3.5)
GLUCOSE SERPL-MCNC: 89 MG/DL (ref 65–99)
GLUCOSE UR STRIP.AUTO-MCNC: NEGATIVE MG/DL
HCT VFR BLD AUTO: 39.9 % (ref 37–47)
HGB BLD-MCNC: 13.4 G/DL (ref 12–16)
HYALINE CASTS #/AREA URNS LPF: ABNORMAL /LPF
IMM GRANULOCYTES # BLD AUTO: 0.03 K/UL (ref 0–0.11)
IMM GRANULOCYTES NFR BLD AUTO: 0.4 % (ref 0–0.9)
KETONES UR STRIP.AUTO-MCNC: ABNORMAL MG/DL
LEUKOCYTE ESTERASE UR QL STRIP.AUTO: NEGATIVE
LIPASE SERPL-CCNC: 43 U/L (ref 11–82)
LYMPHOCYTES # BLD AUTO: 1.67 K/UL (ref 1–4.8)
LYMPHOCYTES NFR BLD: 22.3 % (ref 22–41)
MCH RBC QN AUTO: 29.7 PG (ref 27–33)
MCHC RBC AUTO-ENTMCNC: 33.6 G/DL (ref 33.6–35)
MCV RBC AUTO: 88.5 FL (ref 81.4–97.8)
MICRO URNS: ABNORMAL
MONOCYTES # BLD AUTO: 0.68 K/UL (ref 0–0.85)
MONOCYTES NFR BLD AUTO: 9.1 % (ref 0–13.4)
NEUTROPHILS # BLD AUTO: 4.95 K/UL (ref 2–7.15)
NEUTROPHILS NFR BLD: 65.9 % (ref 44–72)
NITRITE UR QL STRIP.AUTO: NEGATIVE
NRBC # BLD AUTO: 0 K/UL
NRBC BLD-RTO: 0 /100 WBC
NUMBER OF RH DOSES IND 8505RD: NORMAL
PH UR STRIP.AUTO: 6 [PH] (ref 5–8)
PLATELET # BLD AUTO: 327 K/UL (ref 164–446)
PMV BLD AUTO: 9.1 FL (ref 9–12.9)
POTASSIUM SERPL-SCNC: 4 MMOL/L (ref 3.6–5.5)
PROT SERPL-MCNC: 7.2 G/DL (ref 6–8.2)
PROT UR QL STRIP: NEGATIVE MG/DL
RBC # BLD AUTO: 4.51 M/UL (ref 4.2–5.4)
RBC # URNS HPF: ABNORMAL /HPF
RBC UR QL AUTO: ABNORMAL
RH BLD: NORMAL
SODIUM SERPL-SCNC: 140 MMOL/L (ref 135–145)
SP GR UR STRIP.AUTO: 1.03
UROBILINOGEN UR STRIP.AUTO-MCNC: 1 MG/DL
WBC # BLD AUTO: 7.5 K/UL (ref 4.8–10.8)
WBC #/AREA URNS HPF: ABNORMAL /HPF

## 2021-10-18 PROCEDURE — 76801 OB US < 14 WKS SINGLE FETUS: CPT

## 2021-10-18 PROCEDURE — 81001 URINALYSIS AUTO W/SCOPE: CPT

## 2021-10-18 PROCEDURE — 84702 CHORIONIC GONADOTROPIN TEST: CPT

## 2021-10-18 PROCEDURE — 85025 COMPLETE CBC W/AUTO DIFF WBC: CPT

## 2021-10-18 PROCEDURE — 86901 BLOOD TYPING SEROLOGIC RH(D): CPT

## 2021-10-18 PROCEDURE — 83690 ASSAY OF LIPASE: CPT

## 2021-10-18 PROCEDURE — 80053 COMPREHEN METABOLIC PANEL: CPT

## 2021-10-18 PROCEDURE — 99284 EMERGENCY DEPT VISIT MOD MDM: CPT

## 2021-10-18 ASSESSMENT — FIBROSIS 4 INDEX: FIB4 SCORE: 0.25

## 2021-10-18 ASSESSMENT — LIFESTYLE VARIABLES
EVER HAD A DRINK FIRST THING IN THE MORNING TO STEADY YOUR NERVES TO GET RID OF A HANGOVER: NO
TOTAL SCORE: 0
EVER FELT BAD OR GUILTY ABOUT YOUR DRINKING: NO
HAVE YOU EVER FELT YOU SHOULD CUT DOWN ON YOUR DRINKING: NO
TOTAL SCORE: 0
DOES PATIENT WANT TO STOP DRINKING: NO
DO YOU DRINK ALCOHOL: NO
HAVE PEOPLE ANNOYED YOU BY CRITICIZING YOUR DRINKING: NO
TOTAL SCORE: 0
CONSUMPTION TOTAL: INCOMPLETE

## 2021-10-18 ASSESSMENT — PAIN DESCRIPTION - PAIN TYPE: TYPE: ACUTE PAIN

## 2021-10-18 NOTE — ED TRIAGE NOTES
Chief Complaint   Patient presents with   • Pregnancy     LMP 8/19/2021: 6 weeks 4 days US confirmed   • Vaginal Bleeding     gradual onset last night, bright red blood with clots, 1 pad per hour.   • Abdominal Cramping     Patient to triage via ambualtion, with a steady gait, patient A&O x4.  Appropriate precautions in place.     Explained wait time and triage process to pt. Pt placed back in lobby, told to notify ED tech or triage RN of any changes, verbalized understanding.

## 2021-10-18 NOTE — ED PROVIDER NOTES
CHIEF COMPLAINT  Chief Complaint   Patient presents with   • Pregnancy     LMP 8/19/2021: 6 weeks 4 days US confirmed   • Vaginal Bleeding     gradual onset last night, bright red blood with clots, 1 pad per hour.   • Abdominal Cramping       HPI  Karlee Vallejo is a 26 y.o. female who presents with abdominal cramping and bleeding that apparently was heavy last night but has relented somewhat this morning.  She states she is 6 weeks and 4 days pregnant.  She had an ultrasound a week ago.  She states that she was passing up to a pad per hour.  She is a G5 para 1 A3 with 1 ectopic.  He notes no nausea vomiting or diarrhea.  No burning with urination.  Nothing makes her symptoms better.    REVIEW OF SYSTEMS  All other systems are negative.     PAST MEDICAL HISTORY  Past Medical History:   Diagnosis Date   • Gestational hypertension, third trimester 08/08/2019       FAMILY HISTORY  Family History   Problem Relation Age of Onset   • Cancer Maternal Grandfather         brain tumor   • Lung Disease Neg Hx    • Hypertension Neg Hx    • Hyperlipidemia Neg Hx    • Diabetes Neg Hx    • Heart Disease Neg Hx        SOCIAL HISTORY  Social History     Socioeconomic History   • Marital status: Single     Spouse name: Not on file   • Number of children: Not on file   • Years of education: Not on file   • Highest education level: Not on file   Occupational History   • Not on file   Tobacco Use   • Smoking status: Never Smoker   • Smokeless tobacco: Never Used   Vaping Use   • Vaping Use: Never used   Substance and Sexual Activity   • Alcohol use: No   • Drug use: No   • Sexual activity: Yes     Partners: Male   Other Topics Concern   • Not on file   Social History Narrative   • Not on file     Social Determinants of Health     Financial Resource Strain:    • Difficulty of Paying Living Expenses:    Food Insecurity:    • Worried About Running Out of Food in the Last Year:    • Ran Out of Food in the Last Year:    Transportation  "Needs:    • Lack of Transportation (Medical):    • Lack of Transportation (Non-Medical):    Physical Activity:    • Days of Exercise per Week:    • Minutes of Exercise per Session:    Stress:    • Feeling of Stress :    Social Connections:    • Frequency of Communication with Friends and Family:    • Frequency of Social Gatherings with Friends and Family:    • Attends Sikh Services:    • Active Member of Clubs or Organizations:    • Attends Club or Organization Meetings:    • Marital Status:    Intimate Partner Violence:    • Fear of Current or Ex-Partner:    • Emotionally Abused:    • Physically Abused:    • Sexually Abused:        SURGICAL HISTORY  Past Surgical History:   Procedure Laterality Date   • PB  DELIVERY ONLY N/A 2019    Procedure:  SECTION, PRIMARY;  Surgeon: David Cantor M.D.;  Location: LABOR AND DELIVERY;  Service: Labor and Delivery   • EYE SURGERY  2016    bilateral, lasix   • EYE SURGERY         CURRENT MEDICATIONS  Home Medications     Reviewed by Gabi Hsu R.N. (Registered Nurse) on 10/18/21 at 1148  Med List Status: Partial   Medication Last Dose Status   Prenatal Vit-Fe Fumarate-FA (PRENATAL VITAMIN) 27-0.8 MG Tab  Active                ALLERGIES  No Known Allergies    PHYSICAL EXAM  VITAL SIGNS: /74   Pulse 66   Temp 36.6 °C (97.8 °F) (Temporal)   Resp 16   Ht 1.676 m (5' 6\")   Wt 96.3 kg (212 lb 4.9 oz)   LMP 2021 (Approximate)   SpO2 99%   Breastfeeding No   BMI 34.27 kg/m²      Constitutional: Well developed, Well nourished, No acute distress, Non-toxic appearance.   HENT: Normocephalic, Atraumatic, mucous membranes moist, no erythema, exudates, swelling, or masses, nares patent  Eyes: nonicteric  Neck: Supple, no meningismus  Lymphatic: No lymphadenopathy noted.   Cardiovascular: Regular rate and rhythm, no gallops rubs or murmurs  Lungs: Clear bilaterally   Abdomen: Soft and nontender throughout, no rebound or guarding, no " distention  Skin: Warm, Dry, no rash  Back: No tenderness, No CVA tenderness.   Genitalia: Deferred  Rectal: Deferred  Extremities: No edema  Neurologic: Alert, appropriate, follows commands, moving all extremities, normal speech   Psychiatric: Affect normal    RADIOLOGY/PROCEDURES  US-OB 1ST TRIMESTER WITH TRANSVAGINAL (COMBO)   Final Result      Irregular gestational sac with crown-rump corresponding with 7 week 3 day gestation without evidence of fetal heart rate. Additionally, echogenic material seen within the endometrium. Findings are concerning for miscarriage in progress.        Results for orders placed or performed during the hospital encounter of 10/18/21   CBC WITH DIFFERENTIAL   Result Value Ref Range    WBC 7.5 4.8 - 10.8 K/uL    RBC 4.51 4.20 - 5.40 M/uL    Hemoglobin 13.4 12.0 - 16.0 g/dL    Hematocrit 39.9 37.0 - 47.0 %    MCV 88.5 81.4 - 97.8 fL    MCH 29.7 27.0 - 33.0 pg    MCHC 33.6 33.6 - 35.0 g/dL    RDW 43.0 35.9 - 50.0 fL    Platelet Count 327 164 - 446 K/uL    MPV 9.1 9.0 - 12.9 fL    Neutrophils-Polys 65.90 44.00 - 72.00 %    Lymphocytes 22.30 22.00 - 41.00 %    Monocytes 9.10 0.00 - 13.40 %    Eosinophils 1.60 0.00 - 6.90 %    Basophils 0.70 0.00 - 1.80 %    Immature Granulocytes 0.40 0.00 - 0.90 %    Nucleated RBC 0.00 /100 WBC    Neutrophils (Absolute) 4.95 2.00 - 7.15 K/uL    Lymphs (Absolute) 1.67 1.00 - 4.80 K/uL    Monos (Absolute) 0.68 0.00 - 0.85 K/uL    Eos (Absolute) 0.12 0.00 - 0.51 K/uL    Baso (Absolute) 0.05 0.00 - 0.12 K/uL    Immature Granulocytes (abs) 0.03 0.00 - 0.11 K/uL    NRBC (Absolute) 0.00 K/uL   COMP METABOLIC PANEL   Result Value Ref Range    Sodium 140 135 - 145 mmol/L    Potassium 4.0 3.6 - 5.5 mmol/L    Chloride 107 96 - 112 mmol/L    Co2 22 20 - 33 mmol/L    Anion Gap 11.0 7.0 - 16.0    Glucose 89 65 - 99 mg/dL    Bun 11 8 - 22 mg/dL    Creatinine 0.66 0.50 - 1.40 mg/dL    Calcium 9.2 8.5 - 10.5 mg/dL    AST(SGOT) 11 (L) 12 - 45 U/L    ALT(SGPT) 12 2 - 50 U/L     Alkaline Phosphatase 77 30 - 99 U/L    Total Bilirubin 0.3 0.1 - 1.5 mg/dL    Albumin 4.3 3.2 - 4.9 g/dL    Total Protein 7.2 6.0 - 8.2 g/dL    Globulin 2.9 1.9 - 3.5 g/dL    A-G Ratio 1.5 g/dL   LIPASE   Result Value Ref Range    Lipase 43 11 - 82 U/L   HCG QUANTITATIVE   Result Value Ref Range    Bhcg 3896.0 (H) 0.0 - 5.0 mIU/mL   URINALYSIS,CULTURE IF INDICATED    Specimen: Urine   Result Value Ref Range    Color Yellow     Character Clear     Specific Gravity 1.026 <1.035    Ph 6.0 5.0 - 8.0    Glucose Negative Negative mg/dL    Ketones Trace (A) Negative mg/dL    Protein Negative Negative mg/dL    Bilirubin Negative Negative    Urobilinogen, Urine 1.0 Negative    Nitrite Negative Negative    Leukocyte Esterase Negative Negative    Occult Blood Large (A) Negative    Micro Urine Req Microscopic    RH TYPE FOR RHOGAM FROM E.D.   Result Value Ref Range    Emergency Department Rh Typing POS     Number Of Rh Doses Indicated ZERO    URINE MICROSCOPIC (W/UA)   Result Value Ref Range    WBC 0-2 /hpf    RBC 20-50 (A) /hpf    Bacteria Few (A) None /hpf    Epithelial Cells Few /hpf    Ca Oxalate Crystal Many /hpf    Hyaline Cast 0-2 /lpf   ESTIMATED GFR   Result Value Ref Range    GFR If African American >60 >60 mL/min/1.73 m 2    GFR If Non African American >60 >60 mL/min/1.73 m 2         COURSE & MEDICAL DECISION MAKING  Pertinent Labs & Imaging studies reviewed. (See chart for details)  This is a 26-year-old who presents with what is likely a incomplete miscarriage.  The patient had vaginal bleeding and cramping is started last night.  It let up somewhat today.  Labs here are reassuring-ultrasound demonstrates a 7-week IUP that it appears irregular and there is no heart beat.  Hemoglobin is 13.  Patient's vitals are stable.  The patient has follow-up with pregnancy center on Monday.  I will recommend a repeat quantitative beta-hCG in the next 48 hours.  She will otherwise return for increased bleeding or other  concerns in the interim.    FINAL IMPRESSION  1.  Incomplete miscarriage  2.   3.         Electronically signed by: Ron Mohr M.D., 10/18/2021 2:44 PM

## 2021-10-18 NOTE — ED NOTES
"IV established. Labs drawn. Pt reports 9/10 pelvic pain but states its \"tolerable\" and denies need for pain/nausea medications. BP/spo2 monitoring in place. VSS.     , confirmed IUP. approx 6 weeks gestation  "

## 2021-10-18 NOTE — TELEPHONE ENCOUNTER
PT called stating she is pregnant and has been bleeding and cramping since 7 a.m yesterday moring 10/17/21, I advised PT to go to the ER to be evaluated. PT verbalized understanding.

## 2021-10-19 NOTE — DISCHARGE INSTRUCTIONS
Repeat quantitative beta-hCG in the next 48 hours and ER/urgent care.  Please follow-up with the pregnancy center on Monday.  Return sooner for any significant bleeding or other concerns.

## 2021-10-20 ENCOUNTER — HOSPITAL ENCOUNTER (OUTPATIENT)
Dept: LAB | Facility: MEDICAL CENTER | Age: 26
End: 2021-10-20
Attending: OBSTETRICS & GYNECOLOGY
Payer: MEDICAID

## 2021-10-20 ENCOUNTER — TELEPHONE (OUTPATIENT)
Dept: OBGYN | Facility: CLINIC | Age: 26
End: 2021-10-20

## 2021-10-20 DIAGNOSIS — N93.8 DUB (DYSFUNCTIONAL UTERINE BLEEDING): ICD-10-CM

## 2021-10-20 LAB — B-HCG SERPL-ACNC: 1318 MIU/ML (ref 0–5)

## 2021-10-20 PROCEDURE — 84702 CHORIONIC GONADOTROPIN TEST: CPT

## 2021-10-20 PROCEDURE — 36415 COLL VENOUS BLD VENIPUNCTURE: CPT

## 2021-10-20 NOTE — TELEPHONE ENCOUNTER
Pt walked in [facility] requesting lab order for HCG Quant. Dr. Cantor notified and will order lab. Zuleika notified and will print order for pt

## 2021-10-25 ENCOUNTER — GYNECOLOGY VISIT (OUTPATIENT)
Dept: OBGYN | Facility: CLINIC | Age: 26
End: 2021-10-25
Payer: MEDICAID

## 2021-10-25 VITALS — DIASTOLIC BLOOD PRESSURE: 83 MMHG | WEIGHT: 212 LBS | BODY MASS INDEX: 34.22 KG/M2 | SYSTOLIC BLOOD PRESSURE: 118 MMHG

## 2021-10-25 DIAGNOSIS — O03.9 MISCARRIAGE: Primary | ICD-10-CM

## 2021-10-25 LAB
INT CON NEG: NORMAL
INT CON POS: NORMAL
POC URINE PREGNANCY TEST: POSITIVE

## 2021-10-25 PROCEDURE — 81025 URINE PREGNANCY TEST: CPT | Performed by: OBSTETRICS & GYNECOLOGY

## 2021-10-25 PROCEDURE — 76830 TRANSVAGINAL US NON-OB: CPT | Performed by: OBSTETRICS & GYNECOLOGY

## 2021-10-25 PROCEDURE — 99214 OFFICE O/P EST MOD 30 MIN: CPT | Mod: 25 | Performed by: OBSTETRICS & GYNECOLOGY

## 2021-10-25 RX ORDER — MISOPROSTOL 200 UG/1
200 TABLET ORAL
Qty: 4 TABLET | Refills: 0 | COMMUNITY
Start: 2021-10-25 | End: 2021-10-28 | Stop reason: SDUPTHER

## 2021-10-25 ASSESSMENT — FIBROSIS 4 INDEX: FIB4 SCORE: 0.23

## 2021-10-25 NOTE — PROGRESS NOTES
Chief complaint;  This patient is a 26 y.o. female , Patient's last menstrual period was 2021 (approximate). presents complaining of dysfunctional uterine bleeding.    Patient is having some spotting but no heavy bleeding  Patient having mild midline cramps but no lateralizing pain    Quantitative beta-hCG  10/18/6463-5740  10/20/6172-3750    Patient was seen in the emergency department on 10/18/2021 where transvaginal ultrasound showed gestational sac inside the uterus with small fetal pole consistent with 7 weeks 3 days no cardiac motion        Patient has a history of previous ectopic treated with methotrexate back in 2017 followed by miscarriage and then a term pregnancy in 2019.      Review of systems-denies; chest pain, shortness of breath, fever, chills, abdominal pain  Past OB history-  OB History    Para Term  AB Living   4 1 1   2 1   SAB TAB Ectopic Molar Multiple Live Births   2       0 1      # Outcome Date GA Lbr Isidro/2nd Weight Sex Delivery Anes PTL Lv   4 Current            3 Term 19 40w0d  3.405 kg (7 lb 8.1 oz) F CS-LTranv Spinal N MNEA      Complications: Fetal Intolerance, Failure to Progress in First Stage   2 SAB 2018 5w0d    SAB         Birth Comments: passed on its own   1 SAB 17     ECTOPIC         Birth Comments: methotrexate treatment     Past surgical history-   Past Surgical History:   Procedure Laterality Date   • PB  DELIVERY ONLY N/A 2019    Procedure:  SECTION, PRIMARY;  Surgeon: David Cantor M.D.;  Location: LABOR AND DELIVERY;  Service: Labor and Delivery   • EYE SURGERY  2016    bilateral, lasix   • EYE SURGERY       Past medical history-   Past Medical History:   Diagnosis Date   • Gestational hypertension, third trimester 2019     Allergies- Patient has no known allergies.  Present medications-   Outpatient Encounter Medications as of 10/25/2021   Medication Sig Dispense Refill   • Prenatal Vit-Fe  Fumarate-FA (PRENATAL VITAMIN) 27-0.8 MG Tab Take  by mouth.       No facility-administered encounter medications on file as of 10/25/2021.     Family history- no history of breast or ovarian cancer  Social history-   Social History     Socioeconomic History   • Marital status: Single     Spouse name: Not on file   • Number of children: Not on file   • Years of education: Not on file   • Highest education level: Not on file   Occupational History   • Not on file   Tobacco Use   • Smoking status: Never Smoker   • Smokeless tobacco: Never Used   Vaping Use   • Vaping Use: Never used   Substance and Sexual Activity   • Alcohol use: No   • Drug use: No   • Sexual activity: Yes     Partners: Male   Other Topics Concern   • Not on file   Social History Narrative   • Not on file     Social Determinants of Health     Financial Resource Strain:    • Difficulty of Paying Living Expenses:    Food Insecurity:    • Worried About Running Out of Food in the Last Year:    • Ran Out of Food in the Last Year:    Transportation Needs:    • Lack of Transportation (Medical):    • Lack of Transportation (Non-Medical):    Physical Activity:    • Days of Exercise per Week:    • Minutes of Exercise per Session:    Stress:    • Feeling of Stress :    Social Connections:    • Frequency of Communication with Friends and Family:    • Frequency of Social Gatherings with Friends and Family:    • Attends [employer] Services:    • Active Member of Clubs or Organizations:    • Attends Club or Organization Meetings:    • Marital Status:    Intimate Partner Violence:    • Fear of Current or Ex-Partner:    • Emotionally Abused:    • Physically Abused:    • Sexually Abused:          Physical examination;   Alert and oriented x3  General-well-developed well-nourished female in no apparent distress  Vitals:    10/25/21 1309   BP: 118/83   BP Location: Right arm   Patient Position: Sitting   Weight: 96.2 kg (212 lb)     Skin is warm and dry    HEENT-normocephalic,nontraumatic,PERRLA,EOMI  Throat- no edema or erythema  Neck-supple  Cardiovascular-regular rate and rhythm, normal S1-S2 without murmurs or gallops  Lungs-clear to auscultation bilaterally  Back-negative CVA tenderness  Abdomen-nondistended positive bowel sounds soft nontender without masses or hepatosplenomegaly  Pelvic examination;  External genitalia-without lesions  Vagina-10 to 15 cc of dark blood in the vaginal vault, no discharge  Cervix-closed,no gross lesions  Uterus-4 weeks size, nontender  Adnexa- without mass or tenderness  Extremities-without cyanosis clubbing or edema  Neurologic-grossly intact    Blood type  A positive    Transvaginal ultrasound; as performed and read by myself; irregularly shaped sac in the lower uterine segment-empty some echogenic material inside.  No free pelvic fluid no adnexal masses    Impression;  Incomplete miscarriage-tubal pregnancy unlikely     Plan;   Patient given 400 mcg of Cytotec to take now orally and repeat in 4 to 6 hours  Repeat quantitative beta-hCG in 24 hours  Follow-up in the office to review results of hCG level  Very close observation is warranted due to patient's previous history of tubal pregnancy  Patient has been instructed to come to the emergency room if she develops increasing pain-reviewed ectopic pregnancy precautions

## 2021-10-25 NOTE — NON-PROVIDER
Pt here for repeat DUB   Pt states is currently spotting and cramping   Good#[phone]   Pharmacy verified  HCG Levels completed   UPT- Positive

## 2021-10-28 ENCOUNTER — GYNECOLOGY VISIT (OUTPATIENT)
Dept: OBGYN | Facility: CLINIC | Age: 26
End: 2021-10-28
Payer: MEDICAID

## 2021-10-28 VITALS
SYSTOLIC BLOOD PRESSURE: 124 MMHG | HEIGHT: 66 IN | DIASTOLIC BLOOD PRESSURE: 72 MMHG | BODY MASS INDEX: 34.39 KG/M2 | WEIGHT: 214 LBS

## 2021-10-28 DIAGNOSIS — O02.1 MISSED ABORTION: Primary | ICD-10-CM

## 2021-10-28 LAB
INT CON NEG: NEGATIVE
INT CON POS: POSITIVE
POC URINE PREGNANCY TEST: POSITIVE

## 2021-10-28 PROCEDURE — 76830 TRANSVAGINAL US NON-OB: CPT | Performed by: OBSTETRICS & GYNECOLOGY

## 2021-10-28 PROCEDURE — 81025 URINE PREGNANCY TEST: CPT | Performed by: OBSTETRICS & GYNECOLOGY

## 2021-10-28 PROCEDURE — 99214 OFFICE O/P EST MOD 30 MIN: CPT | Mod: 25 | Performed by: OBSTETRICS & GYNECOLOGY

## 2021-10-28 RX ORDER — MISOPROSTOL 200 UG/1
800 TABLET ORAL EVERY 6 HOURS
Qty: 8 TABLET | Refills: 0 | COMMUNITY
Start: 2021-10-28 | End: 2021-10-29

## 2021-10-28 ASSESSMENT — FIBROSIS 4 INDEX: FIB4 SCORE: 0.23

## 2021-10-28 NOTE — Clinical Note
Please schedule for suction D&C Monday after other cases if possible. Pt will call office Monday if she has heavy bleeding c/w miscarriage prior to procedure.

## 2021-10-28 NOTE — H&P (VIEW-ONLY)
Gynecology Office Visit and Pre-OP H&P    CC:   Follow-Up (SAB)      HPI:   Patient is a 26 y.o.  who presents for office follow up of missed Ab.  First diagnosed in ED on 10/18 with debris and irregular shaped gestational sac in NIKHIL.  Saw DR. Cantor in office 3 days ago and his US showed similar findings.  He treated the patient with 400mcg cytotec PO x 2 doses.  Patient denies ANY increase in bleeding, cramping or passage of tissue. She reports light mucusy red/brown bleeding.  Similar discharge is noted on exam today.       OBSTETRIC HISTORY:  OB History    Para Term  AB Living   4 1 1   2 1   SAB TAB Ectopic Molar Multiple Live Births   1   1   0 1      # Outcome Date GA Lbr Isidro/2nd Weight Sex Delivery Anes PTL Lv   4 Current            3 Term 19 40w0d  3.405 kg (7 lb 8.1 oz) F CS-LTranv Spinal N MENA      Complications: Fetal Intolerance, Failure to Progress in First Stage   2 SAB 2018 5w0d    SAB         Birth Comments: passed on its own   1 Ectopic 17     ECTOPIC         Birth Comments: methotrexate treatment       MEDICAL HISTORY:  Past Medical History:   Diagnosis Date   • Gestational hypertension, third trimester 2019       SURGICAL HISTORY:  Past Surgical History:   Procedure Laterality Date   • PB  DELIVERY ONLY N/A 2019    Procedure:  SECTION, PRIMARY;  Surgeon: David Cantor M.D.;  Location: LABOR AND DELIVERY;  Service: Labor and Delivery   • EYE SURGERY  2016    bilateral, lasix   • EYE SURGERY         FAMILY HISTORY:  Family History   Problem Relation Age of Onset   • Cancer Maternal Grandfather         brain tumor   • Lung Disease Neg Hx    • Hypertension Neg Hx    • Hyperlipidemia Neg Hx    • Diabetes Neg Hx    • Heart Disease Neg Hx        ALLERGIES / REACTIONS:  No Known Allergies    SOCIAL HISTORY:   reports that she has never smoked. She has never used smokeless tobacco. She reports that she does not drink alcohol and does  "not use drugs.    ROS:   Positive ROS: none  Gen: no fevers or chills, no significant weight loss or gain, excessive fatigue  Respiratory:  no cough or dyspnea  Cardiac:  no chest pain, no palpitations, no syncope  Breast: no breast discharge, pain, lump or skin changes  GI:  no heartburn, no abdominal pain, no nausea or vomiting  Urinary: no dysuria, urgency, frequency, incontinence   Psych: no depression or anxiety  Neuro: no migraines with aura, fainting spells, numbness or tingling  Extremities: no joint pain, persistently swollen ankles, recurrent leg cramps       PHYSICAL EXAMINATION:  Vital Signs:   Vitals:    10/28/21 0806   BP: 124/72   BP Location: Right arm   Patient Position: Sitting   BP Cuff Size: Adult   Weight: 97.1 kg (214 lb)   Height: 1.676 m (5' 6\")     Body mass index is 34.54 kg/m².  Constitutional: The patient is well developed and well nourished.  Psychiatric: Patient is oriented to time place and person.   Skin: No rash observed.  Neck: Neck appears symmetric.  Respiratory: normal effort  Abdomen: Soft, non-tender.  Pelvic Exam:   Red/-brown light vaginal bleeding noted on perineum and on US probe  SVE: closed/thick  Extremeties: Legs are symmetric and without tenderness. There is no edema present.    +UPT    IMAGING:  TVUS TODAY:  Uterus: 8cmx4.39cmx4.84cm  Endometrium: thickened throughout entire length of uterus: cavity distended with mixture of thickened endo superiorly and fluid/debris inferiorly.  Stripe 1.52cm at its greatest dimension.  No definitive gestational sac noted.    ASSESSMENT AND PLAN:  26 y.o.       1. Missed    - discussed option to try higher dose of cytotec and place vaginally for better effect.  Also discussed moving forward to schedule D&C for definitive management.   - I can perform surgery on Monday unless pt would like procedure sooner.  She states she is willing to wait and see if cytotec or natural process works until Monday   - Based on US, " cervix is closed but just very distal portion.  I feel if cytotec would help to dilate her cervix everything would pass   - counseled on risk of heavy bleeding with cytotec.   - 800mcg placed vaginally at appt today.  Additional 800mcg dose given to patient to administer at home. She will take 400mcg PO and place 400mcg vaginally (if not bleeding heavily) in 6 hours from now (3PM today).  Pt states she is comfortable with doing this   - reviewed D&C procedure and discussed R/B/A of procedure and details of anesthesia. She consents to proceed with D&C.   - if pt has heavy/change in bleeding today/tomorrow/over the weekend, she will call office first thing Monday for rpt US prior to proceeding with D&C.  If this is unable to occur, we will perform bedside US in preop to determine if surgery is still needed.        Humaira Johns D.O.

## 2021-11-01 ENCOUNTER — HOSPITAL ENCOUNTER (OUTPATIENT)
Facility: MEDICAL CENTER | Age: 26
End: 2021-11-01
Attending: OBSTETRICS & GYNECOLOGY | Admitting: OBSTETRICS & GYNECOLOGY
Payer: MEDICAID

## 2021-11-01 ENCOUNTER — ANESTHESIA (OUTPATIENT)
Dept: SURGERY | Facility: MEDICAL CENTER | Age: 26
End: 2021-11-01
Payer: MEDICAID

## 2021-11-01 ENCOUNTER — ANESTHESIA EVENT (OUTPATIENT)
Dept: SURGERY | Facility: MEDICAL CENTER | Age: 26
End: 2021-11-01
Payer: MEDICAID

## 2021-11-01 VITALS
HEART RATE: 71 BPM | DIASTOLIC BLOOD PRESSURE: 66 MMHG | TEMPERATURE: 97.6 F | HEIGHT: 66 IN | WEIGHT: 211.42 LBS | BODY MASS INDEX: 33.98 KG/M2 | SYSTOLIC BLOOD PRESSURE: 116 MMHG | OXYGEN SATURATION: 97 % | RESPIRATION RATE: 18 BRPM

## 2021-11-01 LAB
EXTERNAL QUALITY CONTROL: NORMAL
PATHOLOGY CONSULT NOTE: NORMAL
SARS-COV+SARS-COV-2 AG RESP QL IA.RAPID: NEGATIVE

## 2021-11-01 PROCEDURE — 502587 HCHG PACK, D&C: Performed by: OBSTETRICS & GYNECOLOGY

## 2021-11-01 PROCEDURE — 700101 HCHG RX REV CODE 250: Performed by: OBSTETRICS & GYNECOLOGY

## 2021-11-01 PROCEDURE — A9270 NON-COVERED ITEM OR SERVICE: HCPCS | Performed by: OBSTETRICS & GYNECOLOGY

## 2021-11-01 PROCEDURE — 700102 HCHG RX REV CODE 250 W/ 637 OVERRIDE(OP): Performed by: OBSTETRICS & GYNECOLOGY

## 2021-11-01 PROCEDURE — 88305 TISSUE EXAM BY PATHOLOGIST: CPT

## 2021-11-01 PROCEDURE — 160009 HCHG ANES TIME/MIN: Performed by: OBSTETRICS & GYNECOLOGY

## 2021-11-01 PROCEDURE — 59820 CARE OF MISCARRIAGE: CPT | Performed by: OBSTETRICS & GYNECOLOGY

## 2021-11-01 PROCEDURE — 700111 HCHG RX REV CODE 636 W/ 250 OVERRIDE (IP): Performed by: ANESTHESIOLOGY

## 2021-11-01 PROCEDURE — 700101 HCHG RX REV CODE 250: Performed by: ANESTHESIOLOGY

## 2021-11-01 PROCEDURE — 700105 HCHG RX REV CODE 258: Performed by: OBSTETRICS & GYNECOLOGY

## 2021-11-01 PROCEDURE — 160046 HCHG PACU - 1ST 60 MINS PHASE II: Performed by: OBSTETRICS & GYNECOLOGY

## 2021-11-01 PROCEDURE — 160029 HCHG SURGERY MINUTES - 1ST 30 MINS LEVEL 4: Performed by: OBSTETRICS & GYNECOLOGY

## 2021-11-01 PROCEDURE — 160025 RECOVERY II MINUTES (STATS): Performed by: OBSTETRICS & GYNECOLOGY

## 2021-11-01 PROCEDURE — 87426 SARSCOV CORONAVIRUS AG IA: CPT | Performed by: OBSTETRICS & GYNECOLOGY

## 2021-11-01 PROCEDURE — 160002 HCHG RECOVERY MINUTES (STAT): Performed by: OBSTETRICS & GYNECOLOGY

## 2021-11-01 PROCEDURE — 76857 US EXAM PELVIC LIMITED: CPT | Mod: 26 | Performed by: OBSTETRICS & GYNECOLOGY

## 2021-11-01 PROCEDURE — 160041 HCHG SURGERY MINUTES - EA ADDL 1 MIN LEVEL 4: Performed by: OBSTETRICS & GYNECOLOGY

## 2021-11-01 PROCEDURE — 160035 HCHG PACU - 1ST 60 MINS PHASE I: Performed by: OBSTETRICS & GYNECOLOGY

## 2021-11-01 PROCEDURE — 160048 HCHG OR STATISTICAL LEVEL 1-5: Performed by: OBSTETRICS & GYNECOLOGY

## 2021-11-01 RX ORDER — OXYCODONE HCL 5 MG/5 ML
5 SOLUTION, ORAL ORAL
Status: DISCONTINUED | OUTPATIENT
Start: 2021-11-01 | End: 2021-11-01 | Stop reason: HOSPADM

## 2021-11-01 RX ORDER — DOXYCYCLINE 100 MG/1
200 TABLET ORAL ONCE
Status: COMPLETED | OUTPATIENT
Start: 2021-11-01 | End: 2021-11-01

## 2021-11-01 RX ORDER — HYDROMORPHONE HYDROCHLORIDE 1 MG/ML
0.2 INJECTION, SOLUTION INTRAMUSCULAR; INTRAVENOUS; SUBCUTANEOUS
Status: DISCONTINUED | OUTPATIENT
Start: 2021-11-01 | End: 2021-11-01 | Stop reason: HOSPADM

## 2021-11-01 RX ORDER — MEPERIDINE HYDROCHLORIDE 25 MG/ML
6.25 INJECTION INTRAMUSCULAR; INTRAVENOUS; SUBCUTANEOUS
Status: DISCONTINUED | OUTPATIENT
Start: 2021-11-01 | End: 2021-11-01 | Stop reason: HOSPADM

## 2021-11-01 RX ORDER — OXYTOCIN 10 [USP'U]/ML
INJECTION, SOLUTION INTRAMUSCULAR; INTRAVENOUS
Status: DISCONTINUED
Start: 2021-11-01 | End: 2021-11-01 | Stop reason: HOSPADM

## 2021-11-01 RX ORDER — DOXYCYCLINE 100 MG/10ML
INJECTION, POWDER, LYOPHILIZED, FOR SOLUTION INTRAVENOUS PRN
Status: DISCONTINUED | OUTPATIENT
Start: 2021-11-01 | End: 2021-11-01 | Stop reason: SURG

## 2021-11-01 RX ORDER — HYDROMORPHONE HYDROCHLORIDE 1 MG/ML
0.1 INJECTION, SOLUTION INTRAMUSCULAR; INTRAVENOUS; SUBCUTANEOUS
Status: DISCONTINUED | OUTPATIENT
Start: 2021-11-01 | End: 2021-11-01 | Stop reason: HOSPADM

## 2021-11-01 RX ORDER — KETOROLAC TROMETHAMINE 30 MG/ML
INJECTION, SOLUTION INTRAMUSCULAR; INTRAVENOUS PRN
Status: DISCONTINUED | OUTPATIENT
Start: 2021-11-01 | End: 2021-11-01 | Stop reason: SURG

## 2021-11-01 RX ORDER — MIDAZOLAM HYDROCHLORIDE 1 MG/ML
INJECTION INTRAMUSCULAR; INTRAVENOUS PRN
Status: DISCONTINUED | OUTPATIENT
Start: 2021-11-01 | End: 2021-11-01 | Stop reason: SURG

## 2021-11-01 RX ORDER — HALOPERIDOL 5 MG/ML
1 INJECTION INTRAMUSCULAR
Status: DISCONTINUED | OUTPATIENT
Start: 2021-11-01 | End: 2021-11-01 | Stop reason: HOSPADM

## 2021-11-01 RX ORDER — HYDRALAZINE HYDROCHLORIDE 20 MG/ML
5 INJECTION INTRAMUSCULAR; INTRAVENOUS
Status: DISCONTINUED | OUTPATIENT
Start: 2021-11-01 | End: 2021-11-01 | Stop reason: HOSPADM

## 2021-11-01 RX ORDER — DEXAMETHASONE SODIUM PHOSPHATE 4 MG/ML
INJECTION, SOLUTION INTRA-ARTICULAR; INTRALESIONAL; INTRAMUSCULAR; INTRAVENOUS; SOFT TISSUE PRN
Status: DISCONTINUED | OUTPATIENT
Start: 2021-11-01 | End: 2021-11-01 | Stop reason: SURG

## 2021-11-01 RX ORDER — DIPHENHYDRAMINE HYDROCHLORIDE 50 MG/ML
12.5 INJECTION INTRAMUSCULAR; INTRAVENOUS
Status: DISCONTINUED | OUTPATIENT
Start: 2021-11-01 | End: 2021-11-01 | Stop reason: HOSPADM

## 2021-11-01 RX ORDER — ONDANSETRON 2 MG/ML
INJECTION INTRAMUSCULAR; INTRAVENOUS PRN
Status: DISCONTINUED | OUTPATIENT
Start: 2021-11-01 | End: 2021-11-01 | Stop reason: SURG

## 2021-11-01 RX ORDER — SODIUM CHLORIDE, SODIUM LACTATE, POTASSIUM CHLORIDE, CALCIUM CHLORIDE 600; 310; 30; 20 MG/100ML; MG/100ML; MG/100ML; MG/100ML
INJECTION, SOLUTION INTRAVENOUS CONTINUOUS
Status: DISCONTINUED | OUTPATIENT
Start: 2021-11-01 | End: 2021-11-01 | Stop reason: HOSPADM

## 2021-11-01 RX ORDER — LIDOCAINE HYDROCHLORIDE 20 MG/ML
INJECTION, SOLUTION EPIDURAL; INFILTRATION; INTRACAUDAL; PERINEURAL PRN
Status: DISCONTINUED | OUTPATIENT
Start: 2021-11-01 | End: 2021-11-01 | Stop reason: SURG

## 2021-11-01 RX ORDER — MISOPROSTOL 200 UG/1
TABLET ORAL
Status: DISCONTINUED
Start: 2021-11-01 | End: 2021-11-01 | Stop reason: HOSPADM

## 2021-11-01 RX ORDER — HYDROMORPHONE HYDROCHLORIDE 1 MG/ML
0.4 INJECTION, SOLUTION INTRAMUSCULAR; INTRAVENOUS; SUBCUTANEOUS
Status: DISCONTINUED | OUTPATIENT
Start: 2021-11-01 | End: 2021-11-01 | Stop reason: HOSPADM

## 2021-11-01 RX ORDER — OXYCODONE HCL 5 MG/5 ML
10 SOLUTION, ORAL ORAL
Status: DISCONTINUED | OUTPATIENT
Start: 2021-11-01 | End: 2021-11-01 | Stop reason: HOSPADM

## 2021-11-01 RX ORDER — ONDANSETRON 2 MG/ML
4 INJECTION INTRAMUSCULAR; INTRAVENOUS
Status: DISCONTINUED | OUTPATIENT
Start: 2021-11-01 | End: 2021-11-01 | Stop reason: HOSPADM

## 2021-11-01 RX ORDER — LABETALOL HYDROCHLORIDE 5 MG/ML
5 INJECTION, SOLUTION INTRAVENOUS
Status: DISCONTINUED | OUTPATIENT
Start: 2021-11-01 | End: 2021-11-01 | Stop reason: HOSPADM

## 2021-11-01 RX ORDER — METHYLERGONOVINE MALEATE 0.2 MG/ML
INJECTION INTRAVENOUS
Status: DISCONTINUED
Start: 2021-11-01 | End: 2021-11-01 | Stop reason: HOSPADM

## 2021-11-01 RX ADMIN — KETOROLAC TROMETHAMINE 30 MG: 30 INJECTION, SOLUTION INTRAMUSCULAR at 09:34

## 2021-11-01 RX ADMIN — SODIUM CHLORIDE, POTASSIUM CHLORIDE, SODIUM LACTATE AND CALCIUM CHLORIDE: 600; 310; 30; 20 INJECTION, SOLUTION INTRAVENOUS at 08:08

## 2021-11-01 RX ADMIN — FENTANYL CITRATE 50 MCG: 50 INJECTION, SOLUTION INTRAMUSCULAR; INTRAVENOUS at 09:07

## 2021-11-01 RX ADMIN — PROPOFOL 250 MG: 10 INJECTION, EMULSION INTRAVENOUS at 09:12

## 2021-11-01 RX ADMIN — DOXYCYCLINE 100 MG: 100 INJECTION, POWDER, LYOPHILIZED, FOR SOLUTION INTRAVENOUS at 08:08

## 2021-11-01 RX ADMIN — LIDOCAINE HYDROCHLORIDE 100 MG: 20 INJECTION, SOLUTION EPIDURAL; INFILTRATION; INTRACAUDAL at 09:12

## 2021-11-01 RX ADMIN — ONDANSETRON 4 MG: 2 INJECTION INTRAMUSCULAR; INTRAVENOUS at 09:31

## 2021-11-01 RX ADMIN — MIDAZOLAM HYDROCHLORIDE 2 MG: 1 INJECTION, SOLUTION INTRAMUSCULAR; INTRAVENOUS at 09:07

## 2021-11-01 RX ADMIN — DEXAMETHASONE SODIUM PHOSPHATE 8 MG: 4 INJECTION, SOLUTION INTRA-ARTICULAR; INTRALESIONAL; INTRAMUSCULAR; INTRAVENOUS; SOFT TISSUE at 09:23

## 2021-11-01 RX ADMIN — DOXYCYCLINE 200 MG: 100 TABLET, FILM COATED ORAL at 10:57

## 2021-11-01 RX ADMIN — DOXYCYCLINE 100 MG: 100 INJECTION, POWDER, LYOPHILIZED, FOR SOLUTION INTRAVENOUS at 09:19

## 2021-11-01 RX ADMIN — FENTANYL CITRATE 50 MCG: 50 INJECTION, SOLUTION INTRAMUSCULAR; INTRAVENOUS at 09:30

## 2021-11-01 ASSESSMENT — FIBROSIS 4 INDEX
FIB4 SCORE: 0.23
FIB4 SCORE: 0.23

## 2021-11-01 ASSESSMENT — PAIN SCALES - GENERAL: PAIN_LEVEL: 1

## 2021-11-01 ASSESSMENT — PAIN DESCRIPTION - PAIN TYPE
TYPE: ACUTE PAIN
TYPE: ACUTE PAIN

## 2021-11-01 NOTE — INTERVAL H&P NOTE
H&P reviewed. The patient was examined and there are no changes to the H&P    HPI: pt reports moderate-severe cramping but denies significant increase in bleeding after cytotec course.    Bedside Abd US performed which still showed endometrial stripe of 14mm, dilated with heterogeneous debris.    Will proceed with suction D&C    Humaira Johns D.O.

## 2021-11-01 NOTE — OR NURSING
0941 Patient arrived from OR post Dilation and evacuation. Patient sleeping. No s/s of distress or discomfort. Report received. Vital sings stable.  0946 Breathing even and unlabored. Patient fully awake denies pain, denies nausea.   0951 Patient awake, tolerating po intake.   1005 Called and updated  with plan of care   1015 Criteria met to transition patient to stage 2 recovery  1021 Patient assisted to bathroom able to void without difficulties.   1029 Discharge instructions given to patient and . Both verbalize understanding. Copy of instructions given to .   1031 Criteria met to discharge patient home.   1059 Patient escorted via w/c to responsible adult and all her personal belongings.

## 2021-11-01 NOTE — ANESTHESIA TIME REPORT
Anesthesia Start and Stop Event Times     Date Time Event    2021 0849 Ready for Procedure     907 Anesthesia Start     942 Anesthesia Stop        Responsible Staff  21    Name Role Begin End    Michaela Senior M.D. Anesth 907        Preop Diagnosis (Free Text):  Pre-op Diagnosis     MISSED         Preop Diagnosis (Codes):    Premium Reason  H. Other (comment)    Comments: locum

## 2021-11-01 NOTE — OP REPORT
Operative Report - Suction D&C    Patient: Karlee Vallejo  MRN: [id number]   YOB: 1995   Age: 26 y.o.   Sex:female  Unit: [facility]  Room/Bed: R OR POOL/NONE  Location: [facility]      Date of Procedure: 2021     Pre-OP Dx: 7 wk missed   Post-OP Dx: same  Procedure: suction dilation and curettage  Surgeon: Humaira Johns D.O.  Assistant: none  Anesthesia: general   Complications: none  EBL: 50 mL  UOP: 50 mL  Specimens: POC to pathology    Findings: closed cervical os; 14mm heterogeneous stripe on preop US    Indications:  26 y.o.  @ ~7wks by dates presented several times to the ED and office for treatment of vaginal bleeding in early pregnancy.  She was diagnosed with a missed Ab with POC in the NIKHIL and given 2 courses of cytotec. She reports cramping but no significant change in passage of POC after medication.  She was counseled on definitive management with D&C and has elected to proceed.  All risks and benefits of procedure were presented to the patient including, but not limited to uterine perforation, pain, bleeding, infection, retained POC, and possibility of need for repeat procedure.  She expressed understanding of these R/B and consented to the procedure.    Procedure:    The patient was taken to the operating room and placed under general anesthesia.  She was prepped and draped in the normal sterile fashion. A time out was taken.  A sterile weighted speculum was placed in the patient's vagina and a Caballero retractor used anteriorly.  The anterior lip of the cervix was grasped with an Allis clamp. The cervix was noted to be visually 0cm dilated.  Hanks dilators were then used to dilate the cervix enough to accommodate a size 9mm suction curette.    The suction device was tested.  The suction curette was advanced gently to the uterine fundus then activated and curette rotated to clear the uterus of the products of conception.  A sharp  curette was then introduced to clear the uterus of all remaining products of conception and this was evacuated using the suction.  The uterine cavity was noted to have significantly decreased in size with a uterine cry noted in 360 degrees.  Additional uterotonics were not given.  At the conclusion of the procedure, there was minimal to no bleeding from the cervix.  The Allis was removed from the cervix and hemostasis noted.    The patient tolerated the procedure well.  The patient was taken to the recovery area in stable condition    Pathology: products of conception.    Humaira Johns D.O.

## 2021-11-01 NOTE — DISCHARGE INSTRUCTIONS
ACTIVITY: Rest and take it easy for the first 24 hours.  A responsible adult is recommended to remain with you during that time.  It is normal to feel sleepy.  We encourage you to not do anything that requires balance, judgment or coordination.    MILD FLU-LIKE SYMPTOMS ARE NORMAL. YOU MAY EXPERIENCE GENERALIZED MUSCLE ACHES, THROAT IRRITATION, HEADACHE AND/OR SOME NAUSEA.    FOR 24 HOURS DO NOT:  Drive, operate machinery or run household appliances.  Drink beer or alcoholic beverages.   Make important decisions or sign legal documents.    SPECIAL INSTRUCTIONS: See detailed discharge instructions    DIET: To avoid nausea, slowly advance diet as tolerated, avoiding spicy or greasy foods for the first day.  Add more substantial food to your diet according to your physician's instructions.  Babies can be fed formula or breast milk as soon as they are hungry.  INCREASE FLUIDS AND FIBER TO AVOID CONSTIPATION.    SURGICAL DRESSING/BATHING: See detailed discharge instructions attached.     FOLLOW-UP APPOINTMENT:  A follow-up appointment should be arranged with your doctor in [phone]; call to schedule.    You should CALL YOUR PHYSICIAN if you develop:  Fever greater than 101 degrees F.  Pain not relieved by medication, or persistent nausea or vomiting.  Excessive bleeding (blood soaking through dressing) or unexpected drainage from the wound.  Extreme redness or swelling around the incision site, drainage of pus or foul smelling drainage.  Inability to urinate or empty your bladder within 8 hours.  Problems with breathing or chest pain.    You should call [phone] if you develop problems with breathing or chest pain.  If you are unable to contact your doctor or surgical center, you should go to the nearest emergency room or urgent care center.  Physician's telephone #: [phone]    If any questions arise, call your doctor.  If your doctor is not available, please feel free to call the Surgical Center at [phone]. The  Contact Center is open Monday through Friday 7AM to 5PM and may speak to a nurse at [phone], or toll free at [phone].     A registered nurse may call you a few days after your surgery to see how you are doing after your procedure.    MEDICATIONS: Resume taking daily medication.  Take prescribed pain medication with food.  If no medication is prescribed, you may take non-aspirin pain medication if needed.  PAIN MEDICATION CAN BE VERY CONSTIPATING.  Take a stool softener or laxative such as senokot, pericolace, or milk of magnesia if needed.    Prescription given for ***.  Last pain medication given at ***.    If your physician has prescribed pain medication that includes Acetaminophen (Tylenol), do not take additional Acetaminophen (Tylenol) while taking the prescribed medication.    Depression / Suicide Risk    As you are discharged from this [facility] facility, it is important to learn how to keep safe from harming yourself.    Recognize the warning signs:  · Abrupt changes in personality, positive or negative- including increase in energy   · Giving away possessions  · Change in eating patterns- significant weight changes-  positive or negative  · Change in sleeping patterns- unable to sleep or sleeping all the time   · Unwillingness or inability to communicate  · Depression  · Unusual sadness, discouragement and loneliness  · Talk of wanting to die  · Neglect of personal appearance   · Rebelliousness- reckless behavior  · Withdrawal from people/activities they love  · Confusion- inability to concentrate     If you or a loved one observes any of these behaviors or has concerns about self-harm, here's what you can do:  · Talk about it- your feelings and reasons for harming yourself  · Remove any means that you might use to hurt yourself (examples: pills, rope, extension cords, firearm)  · Get professional help from the community (Mental Health, Substance Abuse, psychological counseling)  · Do not  be alone:Call your Safe Contact- someone whom you trust who will be there for you.  · Call your local CRISIS HOTLINE 186-7597 or 229-401-1033  · Call your local Children's Mobile Crisis Response Team [place] (703) 517-3337 or www.[employer]  · Call the toll free National Suicide Prevention Hotlines   · National Suicide Prevention Lifeline 493-007-NKEP (5120)  · National [employer] Line Network 800-SUICIDE (976-6169)

## 2021-11-01 NOTE — ANESTHESIA PROCEDURE NOTES
Airway    Date/Time: 11/1/2021 9:14 AM  Performed by: Michaela Senior M.D.  Authorized by: Michaela Senior M.D.     Location:  OR  Urgency:  Elective  Indications for Airway Management:  Anesthesia      Spontaneous Ventilation: absent    Sedation Level:  Deep  Preoxygenated: Yes    Final Airway Type:  Supraglottic airway  Final Supraglottic Airway:  Standard LMA    Number of Attempts at Approach:  1

## 2021-11-01 NOTE — ANESTHESIA POSTPROCEDURE EVALUATION
Patient: Karlee Vallejo    Procedure Summary     Date: 21 Room / Location: [facility] ROOM 25 / SURGERY SAME DAY [facility]    Anesthesia Start: 907 Anesthesia Stop: 942    Procedure: DILATION AND EVACUATION, UTERUS (N/A Uterus) Diagnosis: (MISSED )    Surgeons: Humaira Johns D.O. Responsible Provider: Michaela Senior M.D.    Anesthesia Type: general ASA Status: 2          Final Anesthesia Type: general  Last vitals  BP   Blood Pressure: 116/66    Temp   36.4 °C (97.6 °F)    Pulse   71   Resp   18    SpO2   97 %      Anesthesia Post Evaluation    Patient location during evaluation: PACU  Patient participation: complete - patient participated  Level of consciousness: awake and alert  Pain score: 1    Airway patency: patent  Anesthetic complications: no  Cardiovascular status: hemodynamically stable  Respiratory status: acceptable  Hydration status: euvolemic    PONV: none          No complications documented.     Nurse Pain Score: 0 (NPRS)

## 2021-11-18 ENCOUNTER — GYNECOLOGY VISIT (OUTPATIENT)
Dept: OBGYN | Facility: CLINIC | Age: 26
End: 2021-11-18
Payer: MEDICAID

## 2021-11-18 VITALS
DIASTOLIC BLOOD PRESSURE: 62 MMHG | WEIGHT: 213 LBS | SYSTOLIC BLOOD PRESSURE: 102 MMHG | HEIGHT: 66 IN | BODY MASS INDEX: 34.23 KG/M2

## 2021-11-18 DIAGNOSIS — O02.1 MISSED ABORTION: ICD-10-CM

## 2021-11-18 DIAGNOSIS — Z09 POSTOP CHECK: Primary | ICD-10-CM

## 2021-11-18 PROCEDURE — 99024 POSTOP FOLLOW-UP VISIT: CPT | Performed by: OBSTETRICS & GYNECOLOGY

## 2021-11-18 ASSESSMENT — FIBROSIS 4 INDEX: FIB4 SCORE: 0.23

## 2021-11-18 NOTE — PROGRESS NOTES
"CC: Post-operative check    HPI: Patient is a 26 y.o. year old  who presents for follow up after D&C for missed Ab on . She reports feeling well.  She had light bleeding for a few days, then stopped, then light bleeding again.  She has stopped bleeding now.  Reports mild cramping postop that has since resolved.  This was an intended pregnancy and they would like to resume attempts at conception.    Date of Surgery: 21    ROS:   General: Fever: none  GI: Abdominal pain: none  : Vaginal bleeding: none    PFSH:  I personally reviewed the past medical and surgical histories.     PHYSICAL EXAMINATION:  Vital Signs:   Vitals:    21 1102   BP: 102/62   BP Location: Left arm   Patient Position: Sitting   BP Cuff Size: Adult   Weight: 96.6 kg (213 lb)   Height: 1.676 m (5' 6\")     Appearance/Psychiatric: appears well. NAD  Heart: She does not have edema.  Lungs:Respiratory effort is normal.  Abd: Benign and Soft, flat, non-tender  Pelvic:  deferred    ASSESSMENT AND PLAN:  26 y.o.    Karlee was seen today for follow-up.    Diagnoses and all orders for this visit:    Postop check   - doing well   - may return to intercourse now   - recommend condom use just until next normal period occurs then may resume attempts at conception   - declines contraception   - questions answered        Humaira Johns D.O.  2021    "

## 2022-04-14 ENCOUNTER — HOSPITAL ENCOUNTER (OUTPATIENT)
Dept: LAB | Facility: MEDICAL CENTER | Age: 27
End: 2022-04-14
Attending: PHYSICIAN ASSISTANT
Payer: MEDICAID

## 2022-04-14 ENCOUNTER — HOSPITAL ENCOUNTER (OUTPATIENT)
Dept: RADIOLOGY | Facility: MEDICAL CENTER | Age: 27
End: 2022-04-14
Attending: PHYSICIAN ASSISTANT
Payer: MEDICAID

## 2022-04-14 ENCOUNTER — OFFICE VISIT (OUTPATIENT)
Dept: MEDICAL GROUP | Facility: MEDICAL CENTER | Age: 27
End: 2022-04-14
Attending: PHYSICIAN ASSISTANT
Payer: MEDICAID

## 2022-04-14 VITALS
HEART RATE: 65 BPM | WEIGHT: 191 LBS | SYSTOLIC BLOOD PRESSURE: 120 MMHG | OXYGEN SATURATION: 99 % | DIASTOLIC BLOOD PRESSURE: 90 MMHG | RESPIRATION RATE: 18 BRPM | BODY MASS INDEX: 30.7 KG/M2 | TEMPERATURE: 99 F | HEIGHT: 66 IN

## 2022-04-14 DIAGNOSIS — N93.9 ABNORMAL UTERINE BLEEDING (AUB): ICD-10-CM

## 2022-04-14 LAB — B-HCG SERPL-ACNC: 69.2 MIU/ML (ref 0–5)

## 2022-04-14 PROCEDURE — 99213 OFFICE O/P EST LOW 20 MIN: CPT | Performed by: PHYSICIAN ASSISTANT

## 2022-04-14 PROCEDURE — 36415 COLL VENOUS BLD VENIPUNCTURE: CPT

## 2022-04-14 PROCEDURE — 84702 CHORIONIC GONADOTROPIN TEST: CPT

## 2022-04-14 PROCEDURE — 76801 OB US < 14 WKS SINGLE FETUS: CPT

## 2022-04-14 PROCEDURE — 99212 OFFICE O/P EST SF 10 MIN: CPT | Performed by: PHYSICIAN ASSISTANT

## 2022-04-14 ASSESSMENT — FIBROSIS 4 INDEX: FIB4 SCORE: 0.24

## 2022-04-14 NOTE — ASSESSMENT & PLAN NOTE
Karlee presents today for follow up. She reports a positive pregnancy test on 4/3/22. She reports that on 4/17/22 she started experiencing vaginal bleeding and abdominal cramping. She reports that on that Friday she started noticing blood clots but the abdominal pain resolved. She reports that the bleeding started slowing down on Monday, 4/11/22 and is currently tapering and is very light. She has a history of a miscarriage that took place approximately 5 months ago.

## 2022-04-14 NOTE — PROGRESS NOTES
"Chief Complaint   Patient presents with   • Follow-Up     Subjective:     HPI:   Karlee Vallejo is a 27 y.o. female here to discuss the evaluation and management of:    Abnormal uterine bleeding (AUB)  Karlee presents today for follow up. She reports a positive pregnancy test on 4/3/22. She reports that on 22 she started experiencing vaginal bleeding and abdominal cramping. She reports that on that Friday she started noticing blood clots but the abdominal pain resolved. She reports that the bleeding started slowing down on Monday, 22 and is currently tapering and is very light. She has a history of a miscarriage that took place approximately 5 months ago.     ROS  See HPI.     No Known Allergies    Current medicines (including changes today)  Current Outpatient Medications   Medication Sig Dispense Refill   • Prenatal Vit-Fe Fumarate-FA (PRENATAL VITAMIN) 27-0.8 MG Tab Take  by mouth.       No current facility-administered medications for this visit.       Social History     Tobacco Use   • Smoking status: Never Smoker   • Smokeless tobacco: Never Used   Vaping Use   • Vaping Use: Never used   Substance Use Topics   • Alcohol use: Not Currently   • Drug use: No       Patient Active Problem List    Diagnosis Date Noted   • Abnormal uterine bleeding (AUB) 2022   • Missed  10/28/2021   • Irregular bleeding 2019   • Tubal pregnancy without intrauterine pregnancy- s/p MTX (2017) 2017       Family History   Problem Relation Age of Onset   • Cancer Maternal Grandfather         brain tumor   • Lung Disease Neg Hx    • Hypertension Neg Hx    • Hyperlipidemia Neg Hx    • Diabetes Neg Hx    • Heart Disease Neg Hx         Objective:     /90 (BP Location: Left arm, Patient Position: Sitting, BP Cuff Size: Adult)   Pulse 65   Temp 37.2 °C (99 °F) (Skin)   Resp 18   Ht 1.676 m (5' 6\")   Wt 86.6 kg (191 lb)   SpO2 99%  Body mass index is 30.83 kg/m².    Physical " Exam:  Physical Exam  Vitals reviewed.   Constitutional:       Appearance: Normal appearance.   Eyes:      Pupils: Pupils are equal, round, and reactive to light.   Cardiovascular:      Rate and Rhythm: Normal rate and regular rhythm.      Heart sounds: Normal heart sounds.   Pulmonary:      Effort: Pulmonary effort is normal.      Breath sounds: Normal breath sounds.   Abdominal:      General: Abdomen is flat. Bowel sounds are normal.      Palpations: Abdomen is soft.      Tenderness: There is no abdominal tenderness.   Neurological:      General: No focal deficit present.      Mental Status: She is alert and oriented to person, place, and time.   Psychiatric:         Mood and Affect: Mood normal.         Behavior: Behavior normal.       Assessment and Plan:     The following treatment plan was discussed:    1. Abnormal uterine bleeding (AUB)  - This is a new condition.  - No red flag signs.   - Plan: Obtain STAT hcg level and ultrasound.   - US-PELVIC COMPLETE (TRANSABDOMINAL/TRANSVAGINAL) (COMBO); Future  - HCG QUANTITATIVE; Future     Any change or worsening of signs or symptoms, patient encouraged to follow-up or report to emergency room for further evaluation. Patient verbalizes understanding and agrees.    Follow-Up: Return if symptoms worsen or fail to improve.      PLEASE NOTE: This dictation was created using voice recognition software. I have made every reasonable attempt to correct obvious errors, but I expect that there are errors of grammar and possibly content that I did not discover before finalizing the note.

## 2022-04-18 ENCOUNTER — HOSPITAL ENCOUNTER (OUTPATIENT)
Dept: LAB | Facility: MEDICAL CENTER | Age: 27
End: 2022-04-18
Attending: PHYSICIAN ASSISTANT
Payer: MEDICAID

## 2022-04-18 DIAGNOSIS — N93.9 ABNORMAL UTERINE BLEEDING (AUB): ICD-10-CM

## 2022-04-18 LAB — B-HCG SERPL-ACNC: 7 MIU/ML (ref 0–5)

## 2022-04-18 PROCEDURE — 84702 CHORIONIC GONADOTROPIN TEST: CPT

## 2022-04-18 PROCEDURE — 36415 COLL VENOUS BLD VENIPUNCTURE: CPT

## 2022-07-18 ENCOUNTER — HOSPITAL ENCOUNTER (OUTPATIENT)
Dept: LAB | Facility: MEDICAL CENTER | Age: 27
End: 2022-07-18
Attending: EMERGENCY MEDICINE
Payer: MEDICAID

## 2022-07-18 LAB — B-HCG SERPL-ACNC: 132 MIU/ML (ref 0–5)

## 2022-07-18 PROCEDURE — 84702 CHORIONIC GONADOTROPIN TEST: CPT

## 2022-07-18 PROCEDURE — 36415 COLL VENOUS BLD VENIPUNCTURE: CPT

## 2022-07-21 ENCOUNTER — HOSPITAL ENCOUNTER (OUTPATIENT)
Dept: LAB | Facility: MEDICAL CENTER | Age: 27
End: 2022-07-21
Attending: EMERGENCY MEDICINE
Payer: MEDICAID

## 2022-07-21 LAB — B-HCG SERPL-ACNC: 26.3 MIU/ML (ref 0–5)

## 2022-07-21 PROCEDURE — 36415 COLL VENOUS BLD VENIPUNCTURE: CPT

## 2022-07-21 PROCEDURE — 84702 CHORIONIC GONADOTROPIN TEST: CPT

## 2022-07-25 PROCEDURE — RXMED WILLOW AMBULATORY MEDICATION CHARGE: Performed by: NURSE PRACTITIONER

## 2022-07-26 ENCOUNTER — PHARMACY VISIT (OUTPATIENT)
Dept: PHARMACY | Facility: MEDICAL CENTER | Age: 27
End: 2022-07-26
Payer: COMMERCIAL

## 2022-07-29 ENCOUNTER — HOSPITAL ENCOUNTER (OUTPATIENT)
Dept: LAB | Facility: MEDICAL CENTER | Age: 27
End: 2022-07-29
Attending: OBSTETRICS & GYNECOLOGY
Payer: MEDICAID

## 2022-07-29 LAB — B-HCG SERPL-ACNC: <1 MIU/ML (ref 0–5)

## 2022-07-29 PROCEDURE — 36415 COLL VENOUS BLD VENIPUNCTURE: CPT

## 2022-07-29 PROCEDURE — 84702 CHORIONIC GONADOTROPIN TEST: CPT

## 2022-08-19 PROCEDURE — RXMED WILLOW AMBULATORY MEDICATION CHARGE: Performed by: NURSE PRACTITIONER

## 2022-08-22 ENCOUNTER — PHARMACY VISIT (OUTPATIENT)
Dept: PHARMACY | Facility: MEDICAL CENTER | Age: 27
End: 2022-08-22
Payer: COMMERCIAL

## 2022-08-22 NOTE — ANESTHESIA PREPROCEDURE EVALUATION
Missed ab    Relevant Problems   No relevant active problems       Physical Exam    Airway   Mallampati: II  TM distance: >3 FB  Neck ROM: full       Cardiovascular - normal exam  Rhythm: regular  Rate: normal  (-) murmur     Dental - normal exam           Pulmonary - normal exam  Breath sounds clear to auscultation     Abdominal    Neurological - normal exam                 Anesthesia Plan    ASA 2       Plan - general       Airway plan will be LMA          Induction: intravenous          Informed Consent:    Anesthetic plan and risks discussed with patient.         Problem: Discharge Planning  Goal: Discharge to home or other facility with appropriate resources  Outcome: Adequate for Discharge     Problem: Pain  Goal: Verbalizes/displays adequate comfort level or baseline comfort level  Outcome: Adequate for Discharge     Problem: Skin/Tissue Integrity  Goal: Absence of new skin breakdown  Description: 1. Monitor for areas of redness and/or skin breakdown  2. Assess vascular access sites hourly  3. Every 4-6 hours minimum:  Change oxygen saturation probe site  4. Every 4-6 hours:  If on nasal continuous positive airway pressure, respiratory therapy assess nares and determine need for appliance change or resting period.   Outcome: Adequate for Discharge     Problem: Safety - Adult  Goal: Free from fall injury  Outcome: Adequate for Discharge     Problem: ABCDS Injury Assessment  Goal: Absence of physical injury  Outcome: Adequate for Discharge     Problem: Chronic Conditions and Co-morbidities  Goal: Patient's chronic conditions and co-morbidity symptoms are monitored and maintained or improved  Outcome: Adequate for Discharge

## 2022-09-19 PROCEDURE — RXMED WILLOW AMBULATORY MEDICATION CHARGE: Performed by: NURSE PRACTITIONER

## 2022-09-20 ENCOUNTER — PHARMACY VISIT (OUTPATIENT)
Dept: PHARMACY | Facility: MEDICAL CENTER | Age: 27
End: 2022-09-20
Payer: COMMERCIAL

## 2022-12-07 ENCOUNTER — PHARMACY VISIT (OUTPATIENT)
Dept: PHARMACY | Facility: MEDICAL CENTER | Age: 27
End: 2022-12-07
Payer: COMMERCIAL

## 2022-12-07 PROCEDURE — RXMED WILLOW AMBULATORY MEDICATION CHARGE: Performed by: DENTIST

## 2022-12-07 RX ORDER — CLINDAMYCIN HYDROCHLORIDE 150 MG/1
CAPSULE ORAL
Qty: 30 CAPSULE | Refills: 0 | OUTPATIENT
Start: 2022-12-07

## 2022-12-07 RX ORDER — HYDROCODONE BITARTRATE AND ACETAMINOPHEN 7.5; 325 MG/1; MG/1
TABLET ORAL
Qty: 18 TABLET | Refills: 0 | OUTPATIENT
Start: 2022-12-07

## 2025-03-28 ENCOUNTER — APPOINTMENT (OUTPATIENT)
Dept: ADMISSIONS | Facility: MEDICAL CENTER | Age: 30
End: 2025-03-28
Attending: OBSTETRICS & GYNECOLOGY
Payer: MEDICAID

## 2025-04-07 ENCOUNTER — PRE-ADMISSION TESTING (OUTPATIENT)
Dept: ADMISSIONS | Facility: MEDICAL CENTER | Age: 30
End: 2025-04-07
Attending: OBSTETRICS & GYNECOLOGY
Payer: MEDICAID

## 2025-04-09 ENCOUNTER — ANESTHESIA (OUTPATIENT)
Dept: OBGYN | Facility: MEDICAL CENTER | Age: 30
End: 2025-04-09
Payer: MEDICAID

## 2025-04-09 ENCOUNTER — ANESTHESIA EVENT (OUTPATIENT)
Dept: OBGYN | Facility: MEDICAL CENTER | Age: 30
End: 2025-04-09
Payer: MEDICAID

## 2025-04-09 ENCOUNTER — HOSPITAL ENCOUNTER (INPATIENT)
Facility: MEDICAL CENTER | Age: 30
LOS: 2 days | End: 2025-04-11
Attending: OBSTETRICS & GYNECOLOGY | Admitting: OBSTETRICS & GYNECOLOGY
Payer: MEDICAID

## 2025-04-09 DIAGNOSIS — G89.18 POST-OPERATIVE PAIN: ICD-10-CM

## 2025-04-09 LAB
BASOPHILS # BLD AUTO: 0.4 % (ref 0–1.8)
BASOPHILS # BLD: 0.04 K/UL (ref 0–0.12)
EOSINOPHIL # BLD AUTO: 0.05 K/UL (ref 0–0.51)
EOSINOPHIL NFR BLD: 0.6 % (ref 0–6.9)
ERYTHROCYTE [DISTWIDTH] IN BLOOD BY AUTOMATED COUNT: 46.1 FL (ref 35.9–50)
HCT VFR BLD AUTO: 40 % (ref 37–47)
HGB BLD-MCNC: 13.6 G/DL (ref 12–16)
HOLDING TUBE BB 8507: NORMAL
IMM GRANULOCYTES # BLD AUTO: 0.08 K/UL (ref 0–0.11)
IMM GRANULOCYTES NFR BLD AUTO: 0.9 % (ref 0–0.9)
LYMPHOCYTES # BLD AUTO: 1.7 K/UL (ref 1–4.8)
LYMPHOCYTES NFR BLD: 19.1 % (ref 22–41)
MCH RBC QN AUTO: 31.4 PG (ref 27–33)
MCHC RBC AUTO-ENTMCNC: 34 G/DL (ref 32.2–35.5)
MCV RBC AUTO: 92.4 FL (ref 81.4–97.8)
MONOCYTES # BLD AUTO: 0.66 K/UL (ref 0–0.85)
MONOCYTES NFR BLD AUTO: 7.4 % (ref 0–13.4)
NEUTROPHILS # BLD AUTO: 6.39 K/UL (ref 1.82–7.42)
NEUTROPHILS NFR BLD: 71.6 % (ref 44–72)
NRBC # BLD AUTO: 0 K/UL
NRBC BLD-RTO: 0 /100 WBC (ref 0–0.2)
PLATELET # BLD AUTO: 254 K/UL (ref 164–446)
PMV BLD AUTO: 10 FL (ref 9–12.9)
RBC # BLD AUTO: 4.33 M/UL (ref 4.2–5.4)
T PALLIDUM AB SER QL IA: NORMAL
WBC # BLD AUTO: 8.9 K/UL (ref 4.8–10.8)

## 2025-04-09 PROCEDURE — 700105 HCHG RX REV CODE 258: Performed by: STUDENT IN AN ORGANIZED HEALTH CARE EDUCATION/TRAINING PROGRAM

## 2025-04-09 PROCEDURE — 160002 HCHG RECOVERY MINUTES (STAT): Performed by: OBSTETRICS & GYNECOLOGY

## 2025-04-09 PROCEDURE — 700101 HCHG RX REV CODE 250: Performed by: STUDENT IN AN ORGANIZED HEALTH CARE EDUCATION/TRAINING PROGRAM

## 2025-04-09 PROCEDURE — 36415 COLL VENOUS BLD VENIPUNCTURE: CPT

## 2025-04-09 PROCEDURE — 700111 HCHG RX REV CODE 636 W/ 250 OVERRIDE (IP): Performed by: STUDENT IN AN ORGANIZED HEALTH CARE EDUCATION/TRAINING PROGRAM

## 2025-04-09 PROCEDURE — 85025 COMPLETE CBC W/AUTO DIFF WBC: CPT

## 2025-04-09 PROCEDURE — 770002 HCHG ROOM/CARE - OB PRIVATE (112)

## 2025-04-09 PROCEDURE — 160048 HCHG OR STATISTICAL LEVEL 1-5: Performed by: OBSTETRICS & GYNECOLOGY

## 2025-04-09 PROCEDURE — 59514 CESAREAN DELIVERY ONLY: CPT | Mod: 80 | Performed by: OBSTETRICS & GYNECOLOGY

## 2025-04-09 PROCEDURE — A9270 NON-COVERED ITEM OR SERVICE: HCPCS | Performed by: STUDENT IN AN ORGANIZED HEALTH CARE EDUCATION/TRAINING PROGRAM

## 2025-04-09 PROCEDURE — C1755 CATHETER, INTRASPINAL: HCPCS | Performed by: OBSTETRICS & GYNECOLOGY

## 2025-04-09 PROCEDURE — 700111 HCHG RX REV CODE 636 W/ 250 OVERRIDE (IP): Mod: JZ | Performed by: OBSTETRICS & GYNECOLOGY

## 2025-04-09 PROCEDURE — 700102 HCHG RX REV CODE 250 W/ 637 OVERRIDE(OP): Performed by: STUDENT IN AN ORGANIZED HEALTH CARE EDUCATION/TRAINING PROGRAM

## 2025-04-09 PROCEDURE — 160009 HCHG ANES TIME/MIN: Performed by: OBSTETRICS & GYNECOLOGY

## 2025-04-09 PROCEDURE — 86780 TREPONEMA PALLIDUM: CPT

## 2025-04-09 PROCEDURE — 160035 HCHG PACU - 1ST 60 MINS PHASE I: Performed by: OBSTETRICS & GYNECOLOGY

## 2025-04-09 PROCEDURE — 160029 HCHG SURGERY MINUTES - 1ST 30 MINS LEVEL 4: Performed by: OBSTETRICS & GYNECOLOGY

## 2025-04-09 PROCEDURE — 700105 HCHG RX REV CODE 258: Performed by: OBSTETRICS & GYNECOLOGY

## 2025-04-09 PROCEDURE — 700111 HCHG RX REV CODE 636 W/ 250 OVERRIDE (IP): Performed by: OBSTETRICS & GYNECOLOGY

## 2025-04-09 PROCEDURE — 700111 HCHG RX REV CODE 636 W/ 250 OVERRIDE (IP): Mod: JZ | Performed by: STUDENT IN AN ORGANIZED HEALTH CARE EDUCATION/TRAINING PROGRAM

## 2025-04-09 PROCEDURE — 160041 HCHG SURGERY MINUTES - EA ADDL 1 MIN LEVEL 4: Performed by: OBSTETRICS & GYNECOLOGY

## 2025-04-09 PROCEDURE — 160015 HCHG STAT PREOP MINUTES: Performed by: OBSTETRICS & GYNECOLOGY

## 2025-04-09 PROCEDURE — 160036 HCHG PACU - EA ADDL 30 MINS PHASE I: Performed by: OBSTETRICS & GYNECOLOGY

## 2025-04-09 RX ORDER — KETOROLAC TROMETHAMINE 15 MG/ML
15 INJECTION, SOLUTION INTRAMUSCULAR; INTRAVENOUS EVERY 6 HOURS
Status: DISCONTINUED | OUTPATIENT
Start: 2025-04-09 | End: 2025-04-09

## 2025-04-09 RX ORDER — ACETAMINOPHEN 500 MG
1000 TABLET ORAL EVERY 6 HOURS PRN
Status: DISCONTINUED | OUTPATIENT
Start: 2025-04-14 | End: 2025-04-10

## 2025-04-09 RX ORDER — HYDROMORPHONE HYDROCHLORIDE 1 MG/ML
0.4 INJECTION, SOLUTION INTRAMUSCULAR; INTRAVENOUS; SUBCUTANEOUS
Status: ACTIVE | OUTPATIENT
Start: 2025-04-09 | End: 2025-04-10

## 2025-04-09 RX ORDER — DIPHENHYDRAMINE HYDROCHLORIDE 50 MG/ML
12.5 INJECTION, SOLUTION INTRAMUSCULAR; INTRAVENOUS
Status: DISCONTINUED | OUTPATIENT
Start: 2025-04-09 | End: 2025-04-09 | Stop reason: HOSPADM

## 2025-04-09 RX ORDER — SODIUM CHLORIDE, SODIUM LACTATE, POTASSIUM CHLORIDE, CALCIUM CHLORIDE 600; 310; 30; 20 MG/100ML; MG/100ML; MG/100ML; MG/100ML
2000 INJECTION, SOLUTION INTRAVENOUS PRN
Status: DISCONTINUED | OUTPATIENT
Start: 2025-04-09 | End: 2025-04-11 | Stop reason: HOSPADM

## 2025-04-09 RX ORDER — SODIUM CHLORIDE, SODIUM LACTATE, POTASSIUM CHLORIDE, CALCIUM CHLORIDE 600; 310; 30; 20 MG/100ML; MG/100ML; MG/100ML; MG/100ML
INJECTION, SOLUTION INTRAVENOUS CONTINUOUS
Status: DISCONTINUED | OUTPATIENT
Start: 2025-04-09 | End: 2025-04-11

## 2025-04-09 RX ORDER — ONDANSETRON 4 MG/1
4 TABLET, ORALLY DISINTEGRATING ORAL EVERY 6 HOURS PRN
Status: DISCONTINUED | OUTPATIENT
Start: 2025-04-10 | End: 2025-04-11 | Stop reason: HOSPADM

## 2025-04-09 RX ORDER — METOPROLOL TARTRATE 1 MG/ML
1 INJECTION, SOLUTION INTRAVENOUS
Status: DISCONTINUED | OUTPATIENT
Start: 2025-04-09 | End: 2025-04-09 | Stop reason: HOSPADM

## 2025-04-09 RX ORDER — DOCUSATE SODIUM 100 MG/1
100 CAPSULE, LIQUID FILLED ORAL 2 TIMES DAILY PRN
Status: DISCONTINUED | OUTPATIENT
Start: 2025-04-09 | End: 2025-04-11 | Stop reason: HOSPADM

## 2025-04-09 RX ORDER — KETOROLAC TROMETHAMINE 15 MG/ML
15 INJECTION, SOLUTION INTRAMUSCULAR; INTRAVENOUS EVERY 6 HOURS
Status: DISCONTINUED | OUTPATIENT
Start: 2025-04-10 | End: 2025-04-10

## 2025-04-09 RX ORDER — HYDROMORPHONE HYDROCHLORIDE 1 MG/ML
0.4 INJECTION, SOLUTION INTRAMUSCULAR; INTRAVENOUS; SUBCUTANEOUS
Status: DISCONTINUED | OUTPATIENT
Start: 2025-04-09 | End: 2025-04-09 | Stop reason: HOSPADM

## 2025-04-09 RX ORDER — ACETAMINOPHEN 500 MG
1000 TABLET ORAL EVERY 6 HOURS
Status: COMPLETED | OUTPATIENT
Start: 2025-04-09 | End: 2025-04-10

## 2025-04-09 RX ORDER — IBUPROFEN 800 MG/1
800 TABLET, FILM COATED ORAL EVERY 8 HOURS
Status: DISCONTINUED | OUTPATIENT
Start: 2025-04-11 | End: 2025-04-10

## 2025-04-09 RX ORDER — KETOROLAC TROMETHAMINE 15 MG/ML
15 INJECTION, SOLUTION INTRAMUSCULAR; INTRAVENOUS EVERY 6 HOURS
Status: DISCONTINUED | OUTPATIENT
Start: 2025-04-10 | End: 2025-04-09

## 2025-04-09 RX ORDER — GLYCOPYRROLATE 0.2 MG/ML
INJECTION INTRAMUSCULAR; INTRAVENOUS PRN
Status: DISCONTINUED | OUTPATIENT
Start: 2025-04-09 | End: 2025-04-09 | Stop reason: SURG

## 2025-04-09 RX ORDER — SODIUM CHLORIDE, SODIUM GLUCONATE, SODIUM ACETATE, POTASSIUM CHLORIDE AND MAGNESIUM CHLORIDE 526; 502; 368; 37; 30 MG/100ML; MG/100ML; MG/100ML; MG/100ML; MG/100ML
INJECTION, SOLUTION INTRAVENOUS
Status: DISCONTINUED | OUTPATIENT
Start: 2025-04-09 | End: 2025-04-09 | Stop reason: SURG

## 2025-04-09 RX ORDER — EPHEDRINE SULFATE 50 MG/ML
10 INJECTION, SOLUTION INTRAVENOUS
Status: ACTIVE | OUTPATIENT
Start: 2025-04-09 | End: 2025-04-10

## 2025-04-09 RX ORDER — HYDROMORPHONE HYDROCHLORIDE 1 MG/ML
0.2 INJECTION, SOLUTION INTRAMUSCULAR; INTRAVENOUS; SUBCUTANEOUS
Status: ACTIVE | OUTPATIENT
Start: 2025-04-09 | End: 2025-04-10

## 2025-04-09 RX ORDER — ACETAMINOPHEN 500 MG
1000 TABLET ORAL EVERY 6 HOURS
Status: DISCONTINUED | OUTPATIENT
Start: 2025-04-11 | End: 2025-04-10

## 2025-04-09 RX ORDER — DIPHENHYDRAMINE HYDROCHLORIDE 50 MG/ML
25 INJECTION, SOLUTION INTRAMUSCULAR; INTRAVENOUS EVERY 6 HOURS PRN
Status: ACTIVE | OUTPATIENT
Start: 2025-04-09 | End: 2025-04-10

## 2025-04-09 RX ORDER — DEXAMETHASONE SODIUM PHOSPHATE 4 MG/ML
INJECTION, SOLUTION INTRA-ARTICULAR; INTRALESIONAL; INTRAMUSCULAR; INTRAVENOUS; SOFT TISSUE PRN
Status: DISCONTINUED | OUTPATIENT
Start: 2025-04-09 | End: 2025-04-09 | Stop reason: SURG

## 2025-04-09 RX ORDER — ALBUTEROL SULFATE 5 MG/ML
2.5 SOLUTION RESPIRATORY (INHALATION)
Status: DISCONTINUED | OUTPATIENT
Start: 2025-04-09 | End: 2025-04-09 | Stop reason: HOSPADM

## 2025-04-09 RX ORDER — OXYCODONE HYDROCHLORIDE 10 MG/1
10 TABLET ORAL EVERY 4 HOURS PRN
Status: ACTIVE | OUTPATIENT
Start: 2025-04-09 | End: 2025-04-10

## 2025-04-09 RX ORDER — MORPHINE SULFATE 0.5 MG/ML
INJECTION, SOLUTION EPIDURAL; INTRATHECAL; INTRAVENOUS PRN
Status: DISCONTINUED | OUTPATIENT
Start: 2025-04-09 | End: 2025-04-09 | Stop reason: SURG

## 2025-04-09 RX ORDER — CEFAZOLIN SODIUM 1 G/3ML
2 INJECTION, POWDER, FOR SOLUTION INTRAMUSCULAR; INTRAVENOUS ONCE
Status: COMPLETED | OUTPATIENT
Start: 2025-04-09 | End: 2025-04-09

## 2025-04-09 RX ORDER — EPHEDRINE SULFATE 50 MG/ML
INJECTION, SOLUTION INTRAVENOUS PRN
Status: DISCONTINUED | OUTPATIENT
Start: 2025-04-09 | End: 2025-04-09 | Stop reason: SURG

## 2025-04-09 RX ORDER — ONDANSETRON 2 MG/ML
4 INJECTION INTRAMUSCULAR; INTRAVENOUS EVERY 6 HOURS PRN
Status: ACTIVE | OUTPATIENT
Start: 2025-04-09 | End: 2025-04-10

## 2025-04-09 RX ORDER — LABETALOL HYDROCHLORIDE 5 MG/ML
5 INJECTION, SOLUTION INTRAVENOUS
Status: DISCONTINUED | OUTPATIENT
Start: 2025-04-09 | End: 2025-04-09 | Stop reason: HOSPADM

## 2025-04-09 RX ORDER — HYDROMORPHONE HYDROCHLORIDE 1 MG/ML
0.2 INJECTION, SOLUTION INTRAMUSCULAR; INTRAVENOUS; SUBCUTANEOUS
Status: DISCONTINUED | OUTPATIENT
Start: 2025-04-09 | End: 2025-04-09 | Stop reason: HOSPADM

## 2025-04-09 RX ORDER — CITRIC ACID/SODIUM CITRATE 334-500MG
30 SOLUTION, ORAL ORAL ONCE
Status: COMPLETED | OUTPATIENT
Start: 2025-04-09 | End: 2025-04-09

## 2025-04-09 RX ORDER — OXYCODONE HYDROCHLORIDE 10 MG/1
10 TABLET ORAL EVERY 4 HOURS PRN
Status: DISCONTINUED | OUTPATIENT
Start: 2025-04-10 | End: 2025-04-11 | Stop reason: HOSPADM

## 2025-04-09 RX ORDER — HYDRALAZINE HYDROCHLORIDE 20 MG/ML
5 INJECTION INTRAMUSCULAR; INTRAVENOUS
Status: DISCONTINUED | OUTPATIENT
Start: 2025-04-09 | End: 2025-04-09 | Stop reason: HOSPADM

## 2025-04-09 RX ORDER — OXYTOCIN 10 [USP'U]/ML
INJECTION, SOLUTION INTRAMUSCULAR; INTRAVENOUS PRN
Status: DISCONTINUED | OUTPATIENT
Start: 2025-04-09 | End: 2025-04-09 | Stop reason: SURG

## 2025-04-09 RX ORDER — SODIUM CHLORIDE, SODIUM LACTATE, POTASSIUM CHLORIDE, AND CALCIUM CHLORIDE .6; .31; .03; .02 G/100ML; G/100ML; G/100ML; G/100ML
1000 INJECTION, SOLUTION INTRAVENOUS ONCE
Status: COMPLETED | OUTPATIENT
Start: 2025-04-09 | End: 2025-04-09

## 2025-04-09 RX ORDER — BUPIVACAINE HYDROCHLORIDE 7.5 MG/ML
INJECTION, SOLUTION INTRASPINAL
Status: COMPLETED | OUTPATIENT
Start: 2025-04-09 | End: 2025-04-09

## 2025-04-09 RX ORDER — METOCLOPRAMIDE HYDROCHLORIDE 5 MG/ML
10 INJECTION INTRAMUSCULAR; INTRAVENOUS ONCE
Status: COMPLETED | OUTPATIENT
Start: 2025-04-09 | End: 2025-04-09

## 2025-04-09 RX ORDER — HALOPERIDOL 5 MG/ML
1 INJECTION INTRAMUSCULAR
Status: DISCONTINUED | OUTPATIENT
Start: 2025-04-09 | End: 2025-04-09 | Stop reason: HOSPADM

## 2025-04-09 RX ORDER — ONDANSETRON 2 MG/ML
INJECTION INTRAMUSCULAR; INTRAVENOUS PRN
Status: DISCONTINUED | OUTPATIENT
Start: 2025-04-09 | End: 2025-04-09 | Stop reason: SURG

## 2025-04-09 RX ORDER — OXYCODONE HCL 5 MG/5 ML
5 SOLUTION, ORAL ORAL
Status: DISCONTINUED | OUTPATIENT
Start: 2025-04-09 | End: 2025-04-09 | Stop reason: HOSPADM

## 2025-04-09 RX ORDER — ONDANSETRON 2 MG/ML
4 INJECTION INTRAMUSCULAR; INTRAVENOUS
Status: DISCONTINUED | OUTPATIENT
Start: 2025-04-09 | End: 2025-04-09 | Stop reason: HOSPADM

## 2025-04-09 RX ORDER — OXYTOCIN 10 [USP'U]/ML
10 INJECTION, SOLUTION INTRAMUSCULAR; INTRAVENOUS
Status: DISCONTINUED | OUTPATIENT
Start: 2025-04-09 | End: 2025-04-11 | Stop reason: HOSPADM

## 2025-04-09 RX ORDER — KETOROLAC TROMETHAMINE 15 MG/ML
INJECTION, SOLUTION INTRAMUSCULAR; INTRAVENOUS PRN
Status: DISCONTINUED | OUTPATIENT
Start: 2025-04-09 | End: 2025-04-09 | Stop reason: SURG

## 2025-04-09 RX ORDER — SIMETHICONE 125 MG
125 TABLET,CHEWABLE ORAL 4 TIMES DAILY PRN
Status: DISCONTINUED | OUTPATIENT
Start: 2025-04-09 | End: 2025-04-11 | Stop reason: HOSPADM

## 2025-04-09 RX ORDER — DIPHENHYDRAMINE HCL 25 MG
25 TABLET ORAL EVERY 6 HOURS PRN
Status: DISCONTINUED | OUTPATIENT
Start: 2025-04-10 | End: 2025-04-11 | Stop reason: HOSPADM

## 2025-04-09 RX ORDER — MEPERIDINE HYDROCHLORIDE 25 MG/ML
6.25 INJECTION INTRAMUSCULAR; INTRAVENOUS; SUBCUTANEOUS
Status: DISCONTINUED | OUTPATIENT
Start: 2025-04-09 | End: 2025-04-09 | Stop reason: HOSPADM

## 2025-04-09 RX ORDER — DIPHENHYDRAMINE HYDROCHLORIDE 50 MG/ML
25 INJECTION, SOLUTION INTRAMUSCULAR; INTRAVENOUS EVERY 6 HOURS PRN
Status: DISCONTINUED | OUTPATIENT
Start: 2025-04-10 | End: 2025-04-11 | Stop reason: HOSPADM

## 2025-04-09 RX ORDER — DIPHENHYDRAMINE HYDROCHLORIDE 50 MG/ML
12.5 INJECTION, SOLUTION INTRAMUSCULAR; INTRAVENOUS EVERY 6 HOURS PRN
Status: ACTIVE | OUTPATIENT
Start: 2025-04-09 | End: 2025-04-10

## 2025-04-09 RX ORDER — OXYCODONE HYDROCHLORIDE 5 MG/1
5 TABLET ORAL EVERY 4 HOURS PRN
Status: DISCONTINUED | OUTPATIENT
Start: 2025-04-10 | End: 2025-04-11 | Stop reason: HOSPADM

## 2025-04-09 RX ORDER — IBUPROFEN 800 MG/1
800 TABLET, FILM COATED ORAL EVERY 8 HOURS PRN
Status: DISCONTINUED | OUTPATIENT
Start: 2025-04-14 | End: 2025-04-10

## 2025-04-09 RX ORDER — HYDROMORPHONE HYDROCHLORIDE 1 MG/ML
0.1 INJECTION, SOLUTION INTRAMUSCULAR; INTRAVENOUS; SUBCUTANEOUS
Status: DISCONTINUED | OUTPATIENT
Start: 2025-04-09 | End: 2025-04-09 | Stop reason: HOSPADM

## 2025-04-09 RX ORDER — EPHEDRINE SULFATE 50 MG/ML
5 INJECTION, SOLUTION INTRAVENOUS
Status: DISCONTINUED | OUTPATIENT
Start: 2025-04-09 | End: 2025-04-09 | Stop reason: HOSPADM

## 2025-04-09 RX ORDER — SODIUM CHLORIDE 9 MG/ML
INJECTION, SOLUTION INTRAVENOUS ONCE
Status: DISCONTINUED | OUTPATIENT
Start: 2025-04-09 | End: 2025-04-11

## 2025-04-09 RX ORDER — OXYCODONE HYDROCHLORIDE 5 MG/1
5 TABLET ORAL EVERY 4 HOURS PRN
Status: ACTIVE | OUTPATIENT
Start: 2025-04-09 | End: 2025-04-10

## 2025-04-09 RX ORDER — ONDANSETRON 2 MG/ML
4 INJECTION INTRAMUSCULAR; INTRAVENOUS EVERY 6 HOURS PRN
Status: DISCONTINUED | OUTPATIENT
Start: 2025-04-10 | End: 2025-04-11 | Stop reason: HOSPADM

## 2025-04-09 RX ORDER — OXYCODONE HCL 5 MG/5 ML
10 SOLUTION, ORAL ORAL
Status: DISCONTINUED | OUTPATIENT
Start: 2025-04-09 | End: 2025-04-09 | Stop reason: HOSPADM

## 2025-04-09 RX ORDER — MISOPROSTOL 200 UG/1
800 TABLET ORAL
Status: DISCONTINUED | OUTPATIENT
Start: 2025-04-09 | End: 2025-04-11 | Stop reason: HOSPADM

## 2025-04-09 RX ADMIN — GLYCOPYRROLATE 0.1 MG: 0.2 INJECTION INTRAMUSCULAR; INTRAVENOUS at 16:43

## 2025-04-09 RX ADMIN — BUPIVACAINE HYDROCHLORIDE IN DEXTROSE 1.5 ML: 7.5 INJECTION, SOLUTION SUBARACHNOID at 16:32

## 2025-04-09 RX ADMIN — FAMOTIDINE 20 MG: 10 INJECTION, SOLUTION INTRAVENOUS at 16:11

## 2025-04-09 RX ADMIN — OXYTOCIN 20 UNITS: 10 INJECTION, SOLUTION INTRAMUSCULAR; INTRAVENOUS at 16:48

## 2025-04-09 RX ADMIN — PHENYLEPHRINE HYDROCHLORIDE 0.5 MCG/KG/MIN: 10 INJECTION INTRAVENOUS at 16:34

## 2025-04-09 RX ADMIN — CEFAZOLIN 2 G: 1 INJECTION, POWDER, FOR SOLUTION INTRAMUSCULAR; INTRAVENOUS at 16:37

## 2025-04-09 RX ADMIN — SODIUM CHLORIDE, SODIUM GLUCONATE, SODIUM ACETATE, POTASSIUM CHLORIDE AND MAGNESIUM CHLORIDE: 526; 502; 368; 37; 30 INJECTION, SOLUTION INTRAVENOUS at 15:58

## 2025-04-09 RX ADMIN — ACETAMINOPHEN 1000 MG: 500 TABLET, FILM COATED ORAL at 20:54

## 2025-04-09 RX ADMIN — MORPHINE SULFATE 100 MCG: 0.5 INJECTION, SOLUTION EPIDURAL; INTRATHECAL; INTRAVENOUS at 16:32

## 2025-04-09 RX ADMIN — EPHEDRINE SULFATE 5 MG: 50 INJECTION, SOLUTION INTRAVENOUS at 16:43

## 2025-04-09 RX ADMIN — FENTANYL CITRATE 15 MCG: 50 INJECTION, SOLUTION INTRAMUSCULAR; INTRAVENOUS at 16:32

## 2025-04-09 RX ADMIN — OXYTOCIN 125 ML/HR: 10 INJECTION, SOLUTION INTRAMUSCULAR; INTRAVENOUS at 18:05

## 2025-04-09 RX ADMIN — ONDANSETRON 4 MG: 2 INJECTION INTRAMUSCULAR; INTRAVENOUS at 16:34

## 2025-04-09 RX ADMIN — DEXAMETHASONE SODIUM PHOSPHATE 8 MG: 4 INJECTION INTRA-ARTICULAR; INTRALESIONAL; INTRAMUSCULAR; INTRAVENOUS; SOFT TISSUE at 16:37

## 2025-04-09 RX ADMIN — SODIUM CHLORIDE, POTASSIUM CHLORIDE, SODIUM LACTATE AND CALCIUM CHLORIDE 1000 ML: 600; 310; 30; 20 INJECTION, SOLUTION INTRAVENOUS at 16:11

## 2025-04-09 RX ADMIN — SODIUM CITRATE AND CITRIC ACID MONOHYDRATE 30 ML: 2004; 3000 SOLUTION ORAL at 16:23

## 2025-04-09 RX ADMIN — KETOROLAC TROMETHAMINE 15 MG: 15 INJECTION, SOLUTION INTRAMUSCULAR; INTRAVENOUS at 17:01

## 2025-04-09 RX ADMIN — METOCLOPRAMIDE 10 MG: 5 INJECTION, SOLUTION INTRAMUSCULAR; INTRAVENOUS at 16:11

## 2025-04-09 RX ADMIN — GLYCOPYRROLATE 0.1 MG: 0.2 INJECTION INTRAMUSCULAR; INTRAVENOUS at 16:41

## 2025-04-09 SDOH — ECONOMIC STABILITY: TRANSPORTATION INSECURITY
IN THE PAST 12 MONTHS, HAS LACK OF RELIABLE TRANSPORTATION KEPT YOU FROM MEDICAL APPOINTMENTS, MEETINGS, WORK OR FROM GETTING THINGS NEEDED FOR DAILY LIVING?: NO

## 2025-04-09 SDOH — ECONOMIC STABILITY: TRANSPORTATION INSECURITY
IN THE PAST 12 MONTHS, HAS THE LACK OF TRANSPORTATION KEPT YOU FROM MEDICAL APPOINTMENTS OR FROM GETTING MEDICATIONS?: NO

## 2025-04-09 ASSESSMENT — PATIENT HEALTH QUESTIONNAIRE - PHQ9
1. LITTLE INTEREST OR PLEASURE IN DOING THINGS: NOT AT ALL
2. FEELING DOWN, DEPRESSED, IRRITABLE, OR HOPELESS: NOT AT ALL
SUM OF ALL RESPONSES TO PHQ9 QUESTIONS 1 AND 2: 0

## 2025-04-09 ASSESSMENT — SOCIAL DETERMINANTS OF HEALTH (SDOH)
WITHIN THE LAST YEAR, HAVE YOU BEEN HUMILIATED OR EMOTIONALLY ABUSED IN OTHER WAYS BY YOUR PARTNER OR EX-PARTNER?: NO
IN THE PAST 12 MONTHS, HAS THE ELECTRIC, GAS, OIL, OR WATER COMPANY THREATENED TO SHUT OFF SERVICE IN YOUR HOME?: NO
WITHIN THE PAST 12 MONTHS, YOU WORRIED THAT YOUR FOOD WOULD RUN OUT BEFORE YOU GOT THE MONEY TO BUY MORE: NEVER TRUE
WITHIN THE PAST 12 MONTHS, THE FOOD YOU BOUGHT JUST DIDN'T LAST AND YOU DIDN'T HAVE MONEY TO GET MORE: NEVER TRUE
WITHIN THE LAST YEAR, HAVE YOU BEEN KICKED, HIT, SLAPPED, OR OTHERWISE PHYSICALLY HURT BY YOUR PARTNER OR EX-PARTNER?: NO
WITHIN THE LAST YEAR, HAVE TO BEEN RAPED OR FORCED TO HAVE ANY KIND OF SEXUAL ACTIVITY BY YOUR PARTNER OR EX-PARTNER?: NO
WITHIN THE LAST YEAR, HAVE YOU BEEN AFRAID OF YOUR PARTNER OR EX-PARTNER?: NO

## 2025-04-09 ASSESSMENT — LIFESTYLE VARIABLES
HAVE YOU EVER FELT YOU SHOULD CUT DOWN ON YOUR DRINKING: NO
EVER FELT BAD OR GUILTY ABOUT YOUR DRINKING: NO
TOTAL SCORE: 0
TOTAL SCORE: 0
ALCOHOL_USE: NO
CONSUMPTION TOTAL: INCOMPLETE
DOES PATIENT WANT TO STOP DRINKING: NO
TOTAL SCORE: 0
HAVE PEOPLE ANNOYED YOU BY CRITICIZING YOUR DRINKING: NO
EVER HAD A DRINK FIRST THING IN THE MORNING TO STEADY YOUR NERVES TO GET RID OF A HANGOVER: NO

## 2025-04-09 ASSESSMENT — PAIN SCALES - GENERAL: PAINLEVEL: 0 - NO PAIN

## 2025-04-09 NOTE — H&P
History and Physical      Karlee Vallejo is a 30 y.o. female  at 39w0d who presents for repeat  section     Subjective:   negative  For CTXS.   negative Feels pain   negative for LOF  negative for vaginal bleeding.   positive for fetal movement    ROS: A comprehensive review of systems was negative.    Past Medical History:   Diagnosis Date    Gestational hypertension, third trimester 2019    Hepatitis A     at 6 years old     Past Surgical History:   Procedure Laterality Date    SALPINGECTOMY Left 2023    r/t ectopic pregnancy    DILATION AND EVACUATION N/A 2021    Procedure: DILATION AND EVACUATION, UTERUS;  Surgeon: Humaira Johns D.O.;  Location: SURGERY SAME DAY [facility];  Service: Obstetrics    [place]  DELIVERY ONLY N/A 2019    Procedure:  SECTION, PRIMARY;  Surgeon: David Cantor M.D.;  Location: LABOR AND DELIVERY;  Service: Labor and Delivery    EYE SURGERY  2016    bilateral, lasix    EYE SURGERY       Family History   Problem Relation Age of Onset    Cancer Maternal Grandfather         brain tumor    Lung Disease Neg Hx     Hypertension Neg Hx     Hyperlipidemia Neg Hx     Diabetes Neg Hx     Heart Disease Neg Hx      OB History    Para Term  AB Living   5 1 1  3 1   SAB IAB Ectopic Molar Multiple Live Births   1  1  0 1      # Outcome Date GA Lbr Isidro/2nd Weight Sex Type Anes PTL Lv   5 Current            4 AB 21 7w0d    SAB         Birth Comments: D&C for missed Ab   3 Term 19 40w0d  3.405 kg (7 lb 8.1 oz) F CS-LTranv Spinal N MENA      Complications: Fetal Intolerance, Failure to Progress in First Stage   2 SAB 2018 5w0d    SAB         Birth Comments: passed on its own   1 Ectopic 17     ECTOPIC         Birth Comments: methotrexate treatment     Social History     Tobacco Use    Smoking status: Never    Smokeless tobacco: Never   Vaping Use    Vaping status: Never Used   Substance Use Topics    Alcohol  "use: Not Currently    Drug use: No     Allergies: Patient has no known allergies.    Current Facility-Administered Medications:     lactated ringers infusion, , Intravenous, Continuous, Maricel Scott M.D.    ceFAZolin (Ancef) injection 2 g, 2 g, Intravenous, Once, Maricel Scott M.D.    oxytocin (Pitocin) infusion bolus (for post delivery), 20 Units, Intravenous, Once **FOLLOWED BY** oxytocin (Pitocin) infusion (for post delivery), 125 mL/hr, Intravenous, Continuous, Maricel Scott M.D.    sodium citrate-citric acid (Bicitra) 500-334 MG/5ML solution 30 mL, 30 mL, Oral, Once, Noel Resendez D.OAmairani    PHENYLEPHRINE 40 MG/250 ML NS PREMIX, , , ,     Prenatal care with sb jenkins:   Patient Active Problem List    Diagnosis Date Noted    Indication for care in labor or delivery 2025    Abnormal uterine bleeding (AUB) 2022    Missed  10/28/2021    Irregular bleeding 2019    Tubal pregnancy without intrauterine pregnancy- s/p MTX (2017) 2017       Objective:      /77   Pulse 62   Temp 36.1 °C (97 °F) (Temporal)   Resp 18   Ht 1.676 m (5' 6\")   Wt 99.8 kg (220 lb)   SpO2 96%     General:   no acute distress, alert, cooperative   Skin:   normal   HEENT:  Normal    Lungs:   CTA bilateral   Heart:   S1, S2 normal, no murmur, click, rub or gallop, regular rate and rhythm, no hepatosplenomegaly, S1, S2 normal,\"no JVD   Abdomen:   gravid, NT   EFW:  3300   Pelvis:  adequate with gynecoid pelvis   FHT:  150 BPM   Uterine Size: S=D   Presentations: Cephalic   Cat I                               Lab Review  Recent Results (from the past 35 weeks)   Hold Blood Bank Specimen (Not Tested)    Collection Time: 25  2:40 PM   Result Value Ref Range    Holding Tube - Bb DONE    CBC with Differential    Collection Time: 25  2:40 PM   Result Value Ref Range    WBC 8.9 4.8 - 10.8 K/uL    RBC 4.33 4.20 - 5.40 M/uL    Hemoglobin 13.6 12.0 - 16.0 g/dL    Hematocrit " 40.0 37.0 - 47.0 %    MCV 92.4 81.4 - 97.8 fL    MCH 31.4 27.0 - 33.0 pg    MCHC 34.0 32.2 - 35.5 g/dL    RDW 46.1 35.9 - 50.0 fL    Platelet Count 254 164 - 446 K/uL    MPV 10.0 9.0 - 12.9 fL    Neutrophils-Polys 71.60 44.00 - 72.00 %    Lymphocytes 19.10 (L) 22.00 - 41.00 %    Monocytes 7.40 0.00 - 13.40 %    Eosinophils 0.60 0.00 - 6.90 %    Basophils 0.40 0.00 - 1.80 %    Immature Granulocytes 0.90 0.00 - 0.90 %    Nucleated RBC 0.00 0.00 - 0.20 /100 WBC    Neutrophils (Absolute) 6.39 1.82 - 7.42 K/uL    Lymphs (Absolute) 1.70 1.00 - 4.80 K/uL    Monos (Absolute) 0.66 0.00 - 0.85 K/uL    Eos (Absolute) 0.05 0.00 - 0.51 K/uL    Baso (Absolute) 0.04 0.00 - 0.12 K/uL    Immature Granulocytes (abs) 0.08 0.00 - 0.11 K/uL    NRBC (Absolute) 0.00 K/uL   T.PALLIDUM AB MORIS (Syphilis)    Collection Time: 25  2:40 PM   Result Value Ref Range    Syphilis, Treponemal Qual Non-Reactive Non-Reactive        Assessment:   Karlee Vallejo at 39w0d  Labor status: Not in labor.  Obstetrical history significant for   Patient Active Problem List    Diagnosis Date Noted    Indication for care in labor or delivery 2025    Abnormal uterine bleeding (AUB) 2022    Missed  10/28/2021    Irregular bleeding 2019    Tubal pregnancy without intrauterine pregnancy- s/p MTX (2017) 2017   .      Plan:     Admit to L&D  Discussed with the patient indications for  delivery. The patient voiced understanding of indications for  section at this time.    Discussed with the patient the risks of  delivery. The risks include infection, bleeding, scarring, damage to other organs in the area of operation. Specifically organs that can be damaged are bowel, bladder, ureters. Injury to the baby during the delivery process although uncommon.  I also discussed with the patient the risk of wound infection and wound breakdown. We discussed that these risks are greater in people that have  diabetes or obesity. I also discussed the risk of emergency blood transfusion during procedure as well as emergency hysterectomy during procedure.    Patient had the opportunity to ask questions regarding procedures. All questions answered to the patient's satisfaction.  Proceed with  delivery.    Maricel Connelly MD

## 2025-04-09 NOTE — ANESTHESIA PROCEDURE NOTES
Spinal Block    Date/Time: 4/9/2025 4:32 PM    Performed by: Noel Resendez D.O.  Authorized by: Noel Resendez D.O.    Patient Location:  OR  Start Time:  4/9/2025 4:32 PM  Reason for Block: primary anesthetic    patient identified, IV checked, site marked, risks and benefits discussed, surgical consent, monitors and equipment checked, pre-op evaluation and timeout performed    Patient Position:  Sitting  Prep: ChloraPrep, patient draped and sterile technique    Monitoring:  Blood pressure, continuous pulse oximetry and heart rate  Approach:  Midline  Location:  L3-4  Injection Technique:  Single-shot  Skin infiltration:  Lidocaine  Strength:  1%  Dose:  3ml  Needle Type:  Pencan  Needle Gauge:  25 G  CSF flowing pre/post injection:  Yes  Sensory Level:  T4   1 attempt, no apparent complications

## 2025-04-09 NOTE — PROGRESS NOTES
30 y.o  EDC 25 (39w0d)    Pr arrives to Labor and Delivery for scheduled repeat  section. Monitors applied x2. Pt states +FM, denies LOF/VB/Ctx. VSS. POC discussed. Pt now in pre-op. All questions and concerns addressed at this time.    1627 Pt into OR2.    1646 Delivery of viable male. APGARs 8/9. EBL 500mL.    1711 Pt into PACU1 in stable condition.    1917 Pt up to PP in stable condition. Report given to Betsy CARREON.

## 2025-04-09 NOTE — ANESTHESIA PREPROCEDURE EVALUATION
Case: [id number] Date/Time: 25 1615    Procedure: REPEAT  SECTION    Pre-op diagnosis:  SECTION DELIVERY, 39 WEEKS    Location: D OR 01 / SURGERY LABOR AND DELIVERY    Surgeons: Maricel Scott M.D.          Anes H&P:  PAST MEDICAL HISTORY:   30 y.o. female who presents for Procedure(s):  REPEAT  SECTION.  She has current and past medical problems significant for:    Past Medical History:   Diagnosis Date    Gestational hypertension, third trimester 2019    Hepatitis A     at 6 years old       SMOKING/ALCOHOL/RECREATIONAL DRUG USE:  Social History     Tobacco Use    Smoking status: Never    Smokeless tobacco: Never   Vaping Use    Vaping status: Never Used   Substance Use Topics    Alcohol use: Not Currently    Drug use: No     Social History     Substance and Sexual Activity   Drug Use No       PAST SURGICAL HISTORY:  Past Surgical History:   Procedure Laterality Date    SALPINGECTOMY Left 2023    r/t ectopic pregnancy    DILATION AND EVACUATION N/A 2021    Procedure: DILATION AND EVACUATION, UTERUS;  Surgeon: Humaira Johns D.O.;  Location: SURGERY SAME DAY [facility];  Service: Obstetrics    [place]  DELIVERY ONLY N/A 2019    Procedure:  SECTION, PRIMARY;  Surgeon: David Cantor M.D.;  Location: LABOR AND DELIVERY;  Service: Labor and Delivery    EYE SURGERY  2016    bilateral, lasix    EYE SURGERY         ALLERGIES:   No Known Allergies    MEDICATIONS:  No current facility-administered medications on file prior to encounter.     Current Outpatient Medications on File Prior to Encounter   Medication Sig Dispense Refill    Prenatal Vit-Fe Fumarate-FA (PRENATAL VITAMIN) 27-0.8 MG Tab Take  by mouth.         LABS:  Lab Results   Component Value Date/Time    HEMOGLOBIN 13.6 2025 1440    HEMATOCRIT 40.0 2025 1440    WBC 8.9 2025 1440     Lab Results   Component Value Date/Time    SODIUM 140 10/18/2021 1550    POTASSIUM 4.0  10/18/2021 1550    CHLORIDE 107 10/18/2021 1550    CO2 22 10/18/2021 1550    GLUCOSE 89 10/18/2021 1550    BUN 11 10/18/2021 1550    CALCIUM 9.2 10/18/2021 1550         PREVIOUS ANESTHETICS: See EMR  __________________________________________    Relevant Problems   No relevant active problems       Physical Exam    Airway   Mallampati: II  TM distance: >3 FB  Neck ROM: full       Cardiovascular - normal exam  Rhythm: regular  Rate: normal  (-) murmur     Dental - normal exam           Pulmonary - normal exam  Breath sounds clear to auscultation     Abdominal    Neurological - normal exam                   Anesthesia Plan    ASA 2       Plan - spinal   Neuraxial block will be primary anesthetic                Postoperative Plan: Postoperative administration of opioids is intended.    Pertinent diagnostic labs and testing reviewed    Informed Consent:    Anesthetic plan and risks discussed with patient.

## 2025-04-10 PROBLEM — N93.9 ABNORMAL UTERINE BLEEDING (AUB): Status: RESOLVED | Noted: 2022-04-14 | Resolved: 2025-04-10

## 2025-04-10 PROBLEM — O34.219 PREVIOUS CESAREAN DELIVERY AFFECTING PREGNANCY, DELIVERED: Status: ACTIVE | Noted: 2025-04-10

## 2025-04-10 PROBLEM — N92.6 IRREGULAR MENSTRUAL CYCLE: Status: RESOLVED | Noted: 2019-09-24 | Resolved: 2025-04-10

## 2025-04-10 LAB
ERYTHROCYTE [DISTWIDTH] IN BLOOD BY AUTOMATED COUNT: 45 FL (ref 35.9–50)
HCT VFR BLD AUTO: 34.7 % (ref 37–47)
HGB BLD-MCNC: 11.6 G/DL (ref 12–16)
MCH RBC QN AUTO: 31 PG (ref 27–33)
MCHC RBC AUTO-ENTMCNC: 33.4 G/DL (ref 32.2–35.5)
MCV RBC AUTO: 92.8 FL (ref 81.4–97.8)
PLATELET # BLD AUTO: 239 K/UL (ref 164–446)
PMV BLD AUTO: 9.8 FL (ref 9–12.9)
RBC # BLD AUTO: 3.74 M/UL (ref 4.2–5.4)
WBC # BLD AUTO: 15.8 K/UL (ref 4.8–10.8)

## 2025-04-10 PROCEDURE — A9270 NON-COVERED ITEM OR SERVICE: HCPCS | Performed by: OBSTETRICS & GYNECOLOGY

## 2025-04-10 PROCEDURE — 700102 HCHG RX REV CODE 250 W/ 637 OVERRIDE(OP): Performed by: STUDENT IN AN ORGANIZED HEALTH CARE EDUCATION/TRAINING PROGRAM

## 2025-04-10 PROCEDURE — 700102 HCHG RX REV CODE 250 W/ 637 OVERRIDE(OP): Performed by: OBSTETRICS & GYNECOLOGY

## 2025-04-10 PROCEDURE — 85027 COMPLETE CBC AUTOMATED: CPT

## 2025-04-10 PROCEDURE — 700111 HCHG RX REV CODE 636 W/ 250 OVERRIDE (IP): Mod: JZ | Performed by: OBSTETRICS & GYNECOLOGY

## 2025-04-10 PROCEDURE — A9270 NON-COVERED ITEM OR SERVICE: HCPCS | Performed by: STUDENT IN AN ORGANIZED HEALTH CARE EDUCATION/TRAINING PROGRAM

## 2025-04-10 PROCEDURE — 770002 HCHG ROOM/CARE - OB PRIVATE (112)

## 2025-04-10 PROCEDURE — RXMED WILLOW AMBULATORY MEDICATION CHARGE: Performed by: OBSTETRICS & GYNECOLOGY

## 2025-04-10 PROCEDURE — 36415 COLL VENOUS BLD VENIPUNCTURE: CPT

## 2025-04-10 RX ORDER — OXYCODONE AND ACETAMINOPHEN 5; 325 MG/1; MG/1
1 TABLET ORAL EVERY 6 HOURS PRN
Qty: 28 TABLET | Refills: 0 | Status: SHIPPED | OUTPATIENT
Start: 2025-04-10 | End: 2025-04-18

## 2025-04-10 RX ORDER — IBUPROFEN 800 MG/1
800 TABLET, FILM COATED ORAL EVERY 8 HOURS
Status: DISCONTINUED | OUTPATIENT
Start: 2025-04-10 | End: 2025-04-11 | Stop reason: HOSPADM

## 2025-04-10 RX ORDER — IBUPROFEN 800 MG/1
800 TABLET, FILM COATED ORAL EVERY 8 HOURS PRN
Status: DISCONTINUED | OUTPATIENT
Start: 2025-04-13 | End: 2025-04-11 | Stop reason: HOSPADM

## 2025-04-10 RX ORDER — IBUPROFEN 600 MG/1
600 TABLET, FILM COATED ORAL EVERY 6 HOURS PRN
Qty: 30 TABLET | Refills: 3 | Status: SHIPPED | OUTPATIENT
Start: 2025-04-10

## 2025-04-10 RX ORDER — ACETAMINOPHEN 500 MG
1000 TABLET ORAL EVERY 6 HOURS PRN
Status: DISCONTINUED | OUTPATIENT
Start: 2025-04-14 | End: 2025-04-11 | Stop reason: HOSPADM

## 2025-04-10 RX ORDER — DOCUSATE SODIUM 100 MG/1
100 CAPSULE, LIQUID FILLED ORAL 2 TIMES DAILY
Qty: 60 CAPSULE | Refills: 3 | Status: SHIPPED | OUTPATIENT
Start: 2025-04-10

## 2025-04-10 RX ORDER — ACETAMINOPHEN 500 MG
1000 TABLET ORAL EVERY 6 HOURS
Status: DISCONTINUED | OUTPATIENT
Start: 2025-04-10 | End: 2025-04-11 | Stop reason: HOSPADM

## 2025-04-10 RX ADMIN — ACETAMINOPHEN 1000 MG: 500 TABLET, FILM COATED ORAL at 09:16

## 2025-04-10 RX ADMIN — KETOROLAC TROMETHAMINE 15 MG: 15 INJECTION, SOLUTION INTRAMUSCULAR; INTRAVENOUS at 00:05

## 2025-04-10 RX ADMIN — KETOROLAC TROMETHAMINE 15 MG: 15 INJECTION, SOLUTION INTRAMUSCULAR; INTRAVENOUS at 15:55

## 2025-04-10 RX ADMIN — ACETAMINOPHEN 1000 MG: 500 TABLET, FILM COATED ORAL at 15:47

## 2025-04-10 RX ADMIN — KETOROLAC TROMETHAMINE 15 MG: 15 INJECTION, SOLUTION INTRAMUSCULAR; INTRAVENOUS at 05:59

## 2025-04-10 RX ADMIN — ACETAMINOPHEN 1000 MG: 500 TABLET, FILM COATED ORAL at 03:09

## 2025-04-10 RX ADMIN — IBUPROFEN 800 MG: 800 TABLET, FILM COATED ORAL at 20:04

## 2025-04-10 ASSESSMENT — EDINBURGH POSTNATAL DEPRESSION SCALE (EPDS)
I HAVE BLAMED MYSELF UNNECESSARILY WHEN THINGS WENT WRONG: NOT VERY OFTEN
I HAVE BEEN SO UNHAPPY THAT I HAVE BEEN CRYING: NO, NEVER
I HAVE FELT SCARED OR PANICKY FOR NO GOOD REASON: NO, NOT AT ALL
I HAVE BEEN ABLE TO LAUGH AND SEE THE FUNNY SIDE OF THINGS: AS MUCH AS I ALWAYS COULD
I HAVE BEEN ANXIOUS OR WORRIED FOR NO GOOD REASON: NO, NOT AT ALL
I HAVE LOOKED FORWARD WITH ENJOYMENT TO THINGS: AS MUCH AS I EVER DID
I HAVE BEEN SO UNHAPPY THAT I HAVE HAD DIFFICULTY SLEEPING: NOT AT ALL
THE THOUGHT OF HARMING MYSELF HAS OCCURRED TO ME: NEVER
THINGS HAVE BEEN GETTING ON TOP OF ME: NO, I HAVE BEEN COPING AS WELL AS EVER
I HAVE FELT SAD OR MISERABLE: NO, NOT AT ALL

## 2025-04-10 ASSESSMENT — PAIN DESCRIPTION - PAIN TYPE: TYPE: ACUTE PAIN

## 2025-04-10 NOTE — ANESTHESIA TIME REPORT
Anesthesia Start and Stop Event Times       Date Time Event    4/9/2025 1547 Ready for Procedure     1627 Anesthesia Start     1716 Anesthesia Stop          Responsible Staff  04/09/25      Name Role Begin End    Noel Resendez D.O. Anesth 1627 1716          Overtime Reason:  no overtime (within assigned shift)    Comments:

## 2025-04-10 NOTE — DISCHARGE SUMMARY
Discharge Summary:      Karlee Vallejo    Admit Date:   2025  Discharge Date:  4/10/2025     Admitting diagnosis:  Indication for care in labor or delivery [O75.9]  Discharge Diagnosis: Status post  for repeat.  Pregnancy Complications: none  Tubal Ligation:  no        History:  Past Medical History:   Diagnosis Date    Gestational hypertension, third trimester 2019    Hepatitis A     at 6 years old     OB History    Para Term  AB Living   5 2 2  3 2   SAB IAB Ectopic Molar Multiple Live Births   1  1  0 2      # Outcome Date GA Lbr Isidro/2nd Weight Sex Type Anes PTL Lv   5 Term 25 39w0d  3.2 kg (7 lb 0.9 oz) M CS-LTranv Spinal N MENA   4 AB 21 7w0d    SAB         Birth Comments: D&C for missed Ab   3 Term 19 40w0d  3.405 kg (7 lb 8.1 oz) F CS-LTranv Spinal N MENA      Complications: Fetal Intolerance, Failure to Progress in First Stage   2 SAB 2018 5w0d    SAB         Birth Comments: passed on its own   1 Ectopic 17     ECTOPIC         Birth Comments: methotrexate treatment        Patient has no known allergies.  Patient Active Problem List    Diagnosis Date Noted    Previous  delivery affecting pregnancy, delivered 04/10/2025    Indication for care in labor or delivery 2025    Missed  10/28/2021    Tubal pregnancy without intrauterine pregnancy- s/p MTX (2017) 2017        Hospital Course:   30 y.o. , now para 2, was admitted with the above mentioned diagnosis, underwent Repeat  repeat,  for repeat. Patient postpartum course was unremarkable, with progressive advancement in diet , ambulation and toleration of oral analgesia. Patient without complaints today and desires discharge.      Vitals:    04/10/25 0500 04/10/25 0600 04/10/25 0658 04/10/25 0800   BP:  112/60     Pulse: 98 62 64 67   Resp: 17 17 18 16   Temp:  36.4 °C (97.5 °F)     TempSrc:  Temporal     SpO2: 96% 95% 94% 95%   Weight:        Height:           Current Facility-Administered Medications   Medication Dose    lactated ringers infusion      NS infusion      oxytocin (Pitocin) infusion bolus (for post delivery)  20 Units    Followed by    oxytocin (Pitocin) infusion (for post delivery)  125 mL/hr    oxytocin (Pitocin) injection 10 Units  10 Units    miSOPROStol (Cytotec) tablet 800 mcg  800 mcg    acetaminophen (Tylenol) tablet 1,000 mg  1,000 mg    oxyCODONE immediate-release (Roxicodone) tablet 5 mg  5 mg    oxyCODONE immediate release (Roxicodone) tablet 10 mg  10 mg    HYDROmorphone (Dilaudid) injection 0.2 mg  0.2 mg    HYDROmorphone (Dilaudid) injection 0.4 mg  0.4 mg    ePHEDrine injection 10 mg  10 mg    ondansetron (Zofran) syringe/vial injection 4 mg  4 mg    diphenhydrAMINE (Benadryl) injection 12.5 mg  12.5 mg    naloxone HCl (Narcan) 20 mg in  mL infusion  0.25 mcg/kg/hr    diphenhydrAMINE (Benadryl) injection 12.5 mg  12.5 mg    Or    diphenhydrAMINE (Benadryl) injection 25 mg  25 mg    Or    naloxone HCl (Narcan) 20 mg in  mL infusion  0.4 mg/hr    lactated ringers infusion      lactated ringers infusion  2,000 mL    [START ON 4/11/2025] ibuprofen (Motrin) tablet 800 mg  800 mg    Followed by    [START ON 4/14/2025] ibuprofen (Motrin) tablet 800 mg  800 mg    [START ON 4/11/2025] acetaminophen (Tylenol) tablet 1,000 mg  1,000 mg    Followed by    [START ON 4/14/2025] acetaminophen (Tylenol) tablet 1,000 mg  1,000 mg    oxyCODONE immediate-release (Roxicodone) tablet 5 mg  5 mg    oxyCODONE immediate release (Roxicodone) tablet 10 mg  10 mg    ondansetron (Zofran) syringe/vial injection 4 mg  4 mg    Or    ondansetron (Zofran ODT) dispertab 4 mg  4 mg    diphenhydrAMINE (Benadryl) tablet/capsule 25 mg  25 mg    Or    diphenhydrAMINE (Benadryl) injection 25 mg  25 mg    docusate sodium (Colace) capsule 100 mg  100 mg    measles, mumps and rubella vaccine (Mmr) injection 0.5 mL  0.5 mL    simethicone (Mylicon)  chewable tablet 125 mg  125 mg    ketorolac (Toradol) 15 MG/ML injection 15 mg  15 mg    Or    ketorolac (Toradol) 15 MG/ML injection 15 mg  15 mg       Exam:  Breast Exam: negative  Abdomen: Abdomen soft, non-tender. BS normal. No masses,  No organomegaly  Fundus Non Tender: yes  Incision: dry and intact  Perineum: perineum intact  Extremity: extremities, peripheral pulses and reflexes normal     Labs:  Recent Labs     04/09/25  1440 04/10/25  0602   WBC 8.9 15.8*   RBC 4.33 3.74*   HEMOGLOBIN 13.6 11.6*   HEMATOCRIT 40.0 34.7*   MCV 92.4 92.8   MCH 31.4 31.0   MCHC 34.0 33.4   RDW 46.1 45.0   PLATELETCT 254 239   MPV 10.0 9.8        Activity:   Discharge to home  Pelvic Rest x 6 weeks    Assessment:  normal postpartum course  Discharge Assessment: stable     Follow up: 1-2 weeks incision check      Discharge Meds:   Current Outpatient Medications   Medication Sig Dispense Refill    oxyCODONE-acetaminophen (PERCOCET) 5-325 MG Tab Take 1 Tablet by mouth every 6 hours as needed for Severe Pain for up to 7 days. 28 Tablet 0    ibuprofen (MOTRIN) 600 MG Tab Take 1 Tablet by mouth every 6 hours as needed for Moderate Pain. 30 Tablet 3    docusate sodium (COLACE) 100 MG Cap Take 1 Capsule by mouth 2 times a day. 60 Capsule 3       Maricel Scott M.D.

## 2025-04-10 NOTE — ANESTHESIA POSTPROCEDURE EVALUATION
Patient: Karlee Vallejo    Procedure Summary       Date: 25 Room / Location: [facility] OR 02 / SURGERY LABOR AND DELIVERY    Anesthesia Start:  Anesthesia Stop:     Procedure: REPEAT  SECTION Diagnosis: ( SECTION DELIVERY, 39 WEEKS)    Surgeons: Maricel Scott M.D. Responsible Provider: Noel Resendez D.O.    Anesthesia Type: spinal ASA Status: 2            Final Anesthesia Type: spinal  Last vitals  BP   Blood Pressure: 128/77    Temp   36.1 °C (97 °F)    Pulse   62   Resp   18    SpO2   96 %      Anesthesia Post Evaluation    Patient location during evaluation: PACU  Patient participation: complete - patient participated  Level of consciousness: awake and alert    Airway patency: patent  Anesthetic complications: no  Cardiovascular status: hemodynamically stable  Respiratory status: acceptable  Hydration status: euvolemic    PONV: none    patient able to participate, but full recovery from regional anesthesia has not occurred and is not expected within the stipulated timeframe for the completion of the evaluation      No notable events documented.     Nurse Pain Score: 0 (NPRS)

## 2025-04-10 NOTE — LACTATION NOTE
Initial Lactation Consultation:    Met with Karlee and her new baby boy to provide lactation support. Karlee reports breast feeding to be much easier than it was with her first child. She states that baby is waking for regular feedings, and is vigorous at breast. She does have concerns that baby may not be opening his mouth widely enough to obtain a deep latch.        1700-Latch Assistance:  Infant presents with feeding cues at this time; he is un-swaddled and placed in his mother's arms. Hand expression demonstrated for easily expressible colostrum. Karlee is able to independently latch baby in cradle hold. Latch sustained. Infant visualized to have wide gape and rhythmic suck pattern at breast. Mother of baby reports that latch depth is improved from previous feeds. We discussed a transition to cross-cradle hold, if infant appears to return to shallow latching in future feeds.     feeding patterns reviewed. Frequent skin-to-skin and cue-based feeding is encouraged; at least 8 feeds per 24 hours. Reviewed the milk making process, inclusive of supply and demand. Discussed signs of deep, asymmetric latch, and the importance of maintaining good latch to avoid pain/nipple damage and maximize milk transfer. Karlee is to offer both breasts at each feeding, and baby should be allowed to self-limit time at breast.     Feeding plan:     Continue with cue-based breastfeeding, at least once every three hours.    Karlee is provided with the opportunity to ask questions. These have been answered to her satisfaction. She is encouraged to call RN/lactation for additional breastfeeding assistance, as needed, throughout remainder of hospital stay.       Encouraged follow up with [facility] for outpatient lactation support .     [facility] Breastfeeding Resource list provided.

## 2025-04-10 NOTE — OP REPORT
DATE OF PROCEDURE:  2025    PRE-OP DIAGNOSIS:   1. 30 year old  39 weeks               2. Previous  section x 1              3. Desires Repeat  section    POST-OP DIAGNOSIS: same      PROCEDURE: REPEAT LOW TRANSVERSE  SECTION PFANNENSTIEL                              SURGEON: LOW TEJADA MD/LINSEY RUSS MD                                ANESTHESIOLOGIST: JIM Jones MD/SPINAL     SPECIMEN: none     EBL: 500 CC                                                               UO: CLEAR     FINDINGS:delivered to live born baby boy vertex nuchal cord x 1                    BW - 3200 AS                     Placenta delivered complete and intact ; 3VC                    Uterus and ovaries grossly normal                    Left tube is absent; Normal right FT                 DESCRIPTION OF PROCEDURE:                            Patient and family were discussed about the risks, indications, alternatives and complications of the procedure. No further questions. Signed consents.              Patient was taken to the operating room where spinal anesthesia induction was done without difficulty. She was then placed in a supine position with a leftward tilt, prepped and draped in a normal usual sterile fashion. After adequate level of anesthesia has been ascertained, Pfannenstiel incision was then made on the skin and carried down to the underlying layer of fascia. Fascia was nicked in the midline and incision carried down bilaterally sharply. Superior and inferior aspects of fascial incision was clamped with Kocher's, elevated, tented up and dissected off bluntly from underlying rectus muscle. Rectus muscle was then  in the midline and peritoneum entered sharply and extended cephalo-caudally. Good visualization of the lower uterine segment afforded after Oseas O was inserted.   Low transverse incision was then made at the lower uterine segment and extended cephalocaudally  digitally. Infant was delivered in vertex presentation, nuchal cord was reduced, good suctioning of the nose and mouth was done and rest of the body delivered atraumatically. Delayed cord clamping was done and cut and handed off to the RN staff in attendance for further care.   Placenta was delivered complete and intact, 3 VC.  The cavity was cleansed of all clots and debris.   Hysterotomy incision was repaired using Chromic 1 in a continuous interlocking fashion with excellent hemostasis.   Pelvic gutters was cleared of clots and debris.   Peritoneal layer was closed in simple continuous manner using Vicryl 2-0.  Fascial layer was repaired using Vicryl 0 in the simple continuous fashion.  Subcutaneous layer was reapproximated using Vicryl 3-0.  Skin was closed with Insorb staples.   Prineo was applied over the incision. Pressure dressing placed.   All layers were hemostatic.  Procedure was terminated. Sponges, Laps and needles count correct x 3.  Patient was taken to the PACU, awake and in good condition.      Maricel Connelly MD

## 2025-04-10 NOTE — PROGRESS NOTES
Pt. Arrived by rob to postpartum  with belongings to room 332, received report from Gricelda CARREON. Pt. Doing well. Funds firm, light ricardo/rubra. Pt oriented to room, emergency call light and infant security. Pt. Aware of dangers of sleeping with infant. Call light within reach.

## 2025-04-11 ENCOUNTER — PHARMACY VISIT (OUTPATIENT)
Dept: PHARMACY | Facility: MEDICAL CENTER | Age: 30
End: 2025-04-11
Payer: COMMERCIAL

## 2025-04-11 VITALS
HEIGHT: 66 IN | WEIGHT: 220 LBS | OXYGEN SATURATION: 97 % | RESPIRATION RATE: 18 BRPM | HEART RATE: 61 BPM | BODY MASS INDEX: 35.36 KG/M2 | TEMPERATURE: 98.2 F | DIASTOLIC BLOOD PRESSURE: 67 MMHG | SYSTOLIC BLOOD PRESSURE: 117 MMHG

## 2025-04-11 PROCEDURE — A9270 NON-COVERED ITEM OR SERVICE: HCPCS | Performed by: OBSTETRICS & GYNECOLOGY

## 2025-04-11 PROCEDURE — 700102 HCHG RX REV CODE 250 W/ 637 OVERRIDE(OP): Performed by: OBSTETRICS & GYNECOLOGY

## 2025-04-11 RX ADMIN — ACETAMINOPHEN 1000 MG: 500 TABLET, FILM COATED ORAL at 09:23

## 2025-04-11 RX ADMIN — ACETAMINOPHEN 1000 MG: 500 TABLET, FILM COATED ORAL at 02:52

## 2025-04-11 ASSESSMENT — PAIN DESCRIPTION - PAIN TYPE: TYPE: SURGICAL PAIN

## 2025-04-11 NOTE — CARE PLAN
The patient is Stable - Low risk of patient condition declining or worsening    Shift Goals  Clinical Goals: Maintain fundus firm, lochia light; pain management; pt to ambulate in hallways  Patient Goals: pain management    Progress made toward(s) clinical / shift goals:  Fundus firm, lochia scant; pt reported pain relief with use of scheduled pain medications; pt ambulated in hallways with steady gait.

## 2025-04-11 NOTE — DISCHARGE SUMMARY
Discharge Summary:      Karlee Vallejo    Admit Date:   2025  Discharge Date:  2025     Admitting diagnosis:  Indication for care in labor or delivery [O75.9]  Discharge Diagnosis: Status post  for repeat.  Pregnancy Complications: none  Tubal Ligation:  no        History:  Past Medical History:   Diagnosis Date    Gestational hypertension, third trimester 2019    Hepatitis A     at 6 years old     OB History    Para Term  AB Living   5 2 2  3 2   SAB IAB Ectopic Molar Multiple Live Births   1  1  0 2      # Outcome Date GA Lbr Isidro/2nd Weight Sex Type Anes PTL Lv   5 Term 25 39w0d  3.2 kg (7 lb 0.9 oz) M CS-LTranv Spinal N MENA   4 AB 21 7w0d    SAB         Birth Comments: D&C for missed Ab   3 Term 19 40w0d  3.405 kg (7 lb 8.1 oz) F CS-LTranv Spinal N MENA      Complications: Fetal Intolerance, Failure to Progress in First Stage   2 SAB 2018 5w0d    SAB         Birth Comments: passed on its own   1 Ectopic 17     ECTOPIC         Birth Comments: methotrexate treatment        Patient has no known allergies.  Patient Active Problem List    Diagnosis Date Noted    Previous  delivery affecting pregnancy, delivered 04/10/2025    Indication for care in labor or delivery 2025    Missed  10/28/2021    Tubal pregnancy without intrauterine pregnancy- s/p MTX (2017) 2017        Hospital Course:   30 y.o. , now para 2, was admitted with the above mentioned diagnosis, underwent Repeat  repeat,  for repeat. Patient postpartum course was unremarkable, with progressive advancement in diet , ambulation and toleration of oral analgesia. Patient without complaints today and desires discharge.      Vitals:    04/10/25 1500 04/10/25 1757 04/10/25 1800 25 0530   BP: 117/76 125/70 128/83 117/67   Pulse: 76 60 80 61   Resp: 15 16 18 18   Temp: 36.5 °C (97.7 °F) 36.2 °C (97.2 °F) 36.8 °C (98.2 °F) 36.8 °C  (98.2 °F)   TempSrc: Temporal Temporal Temporal Temporal   SpO2: 94% 97% 97% 97%   Weight:       Height:           Current Facility-Administered Medications   Medication Dose    ibuprofen (Motrin) tablet 800 mg  800 mg    Followed by    [START ON 4/13/2025] ibuprofen (Motrin) tablet 800 mg  800 mg    acetaminophen (Tylenol) tablet 1,000 mg  1,000 mg    Followed by    [START ON 4/14/2025] acetaminophen (Tylenol) tablet 1,000 mg  1,000 mg    lactated ringers infusion      NS infusion      oxytocin (Pitocin) infusion (for post delivery)  125 mL/hr    oxytocin (Pitocin) injection 10 Units  10 Units    miSOPROStol (Cytotec) tablet 800 mcg  800 mcg    lactated ringers infusion      lactated ringers infusion  2,000 mL    oxyCODONE immediate-release (Roxicodone) tablet 5 mg  5 mg    oxyCODONE immediate release (Roxicodone) tablet 10 mg  10 mg    ondansetron (Zofran) syringe/vial injection 4 mg  4 mg    Or    ondansetron (Zofran ODT) dispertab 4 mg  4 mg    diphenhydrAMINE (Benadryl) tablet/capsule 25 mg  25 mg    Or    diphenhydrAMINE (Benadryl) injection 25 mg  25 mg    docusate sodium (Colace) capsule 100 mg  100 mg    measles, mumps and rubella vaccine (Mmr) injection 0.5 mL  0.5 mL    simethicone (Mylicon) chewable tablet 125 mg  125 mg       Exam:  Breast Exam: negative  Abdomen: Abdomen soft, non-tender. BS normal. No masses,  No organomegaly  Fundus Non Tender: yes  Incision: dry and intact  Perineum: perineum intact  Extremity: extremities, peripheral pulses and reflexes normal     Labs:  Recent Labs     04/09/25  1440 04/10/25  0602   WBC 8.9 15.8*   RBC 4.33 3.74*   HEMOGLOBIN 13.6 11.6*   HEMATOCRIT 40.0 34.7*   MCV 92.4 92.8   MCH 31.4 31.0   MCHC 34.0 33.4   RDW 46.1 45.0   PLATELETCT 254 239   MPV 10.0 9.8        Activity:   Discharge to home  Pelvic Rest x 6 weeks    Assessment:  normal postpartum course  Discharge Assessment: No areas of skin breakdown/redness; surgical incision intact/healing     Follow up:  1-2 weeks incision check     Discharge Meds:   Current Outpatient Medications   Medication Sig Dispense Refill    oxyCODONE-acetaminophen (PERCOCET) 5-325 MG Tab Take 1 Tablet by mouth every 6 hours as needed for Severe Pain for up to 7 days. 28 Tablet 0    ibuprofen (MOTRIN) 600 MG Tab Take 1 Tablet by mouth every 6 hours as needed for Moderate Pain. 30 Tablet 3    docusate sodium (COLACE) 100 MG Cap Take 1 Capsule by mouth 2 times a day. 60 Capsule 3       Maricel Scott M.D.

## 2025-04-11 NOTE — DISCHARGE INSTRUCTIONS

## 2025-04-11 NOTE — PROGRESS NOTES
0615- Bedside report received from VELMA Ochoa.  Patient denied needs.  0730- Patient assessment done.  Patient reported she is passing flatus.  No void yet.  Patient denied dizziness and reported that she is walking without difficulty.  Patient instructed to ambulate in hallways four times a day.  Discussed pain management plan, to include MD orders for scheduled and prn pain medications.  Reviewed plan of care.  Patient verbalized understanding.  1000- Per ELLE Lowry, patient voided 600 mls.  1030- Patient ambulated in hallways with steady gait and tolerated well.  1730- Patient able to latch infant independently throughout the shift.

## 2025-04-11 NOTE — CARE PLAN
The patient is Stable - Low risk of patient condition declining or worsening    Shift Goals  Clinical Goals: Maintain vitals  Patient Goals: Infant care, pain control  Family Goals: Rest, support    Progress made toward(s) clinical / shift goals:      Problem: Altered Physiologic Condition  Goal: Patient physiologically stable as evidenced by normal lochia, palpable uterine involution and vitals within normal limits  Outcome: Progressing  Note: Fundus firm, lochia light to scant, ambulating.     Problem: Infection - Postpartum  Goal: Postpartum patient will be free of signs and symptoms of infection  Outcome: Progressing  Note: Patient remains free from signs and symptoms of infection, vital signs stable.

## 2025-04-11 NOTE — LACTATION NOTE
Follow up lactation consultation:    Met with Karlee and her baby boy to provide lactation support prior to discharge home. Karlee reports that breastfeeding has been going well, other than the fact that baby has been pulling back on the breast mid-feed, which caused a compression stripe on the left nipple overnight.    We reviewed alternate feeding positions to assist with latch depth. Infant presents with feeding cues at this time. He is placed in side-lying hold and is able to achieve deep latch on breast. Karlee reports increased comfort with this feeding. Infant remains deeply latched throughout entirety of feed. We discussed the importance of removing baby from breast and re-latching if/when shallow latch is noted to prevent further nipple tenderness/damage.    Feeding Plan:    Continue with cue-based breastfeeding, at least once every three hours, for total of 8+ feeds per 24 hours.    Karlee is encouraged to request additional lactation support, as needed throughout hospital stay.

## 2025-04-11 NOTE — PROGRESS NOTES
0930- patient assessment done.  Condition will continue to be monitored.     1235- patient states that she understands all discharge instructions and has no questions at this time.

## (undated) DEVICE — ELECTRODE DUAL RETURN W/ CORD - (50/PK)

## (undated) DEVICE — TRAY SRGPRP PVP IOD WT PRP - (20/CA)

## (undated) DEVICE — CANISTER SUCTION 1200 CC MECHANICAL FILTER AUTO SHUT OFF MEDI-VAC STERILE LF DISP - (40EA/CA)

## (undated) DEVICE — SET EXTENSION WITH 2 PORTS (48EA/CA) ***PART #2C8610 IS A SUBSTITUTE*****

## (undated) DEVICE — BLANKET UNDERBODY ADULT - (10/CA)

## (undated) DEVICE — TOWEL STOP TIMEOUT SAFETY FLAG (40EA/CA)

## (undated) DEVICE — PAD SANITARY 11IN MAXI IND WRAPPED  (12EA/PK 24PK/CA)

## (undated) DEVICE — SUCTION INSTRUMENT YANKAUER BULBOUS TIP W/O VENT (50EA/CA)

## (undated) DEVICE — SUTURE 0 VICRYL PLUS CT-1 - 36 INCH (36/BX)

## (undated) DEVICE — LACTATED RINGERS INJ 1000 ML - (14EA/CA 60CA/PF)

## (undated) DEVICE — PLUMEPEN ULTRA 3/8 IN X 10 FT HOSE (20EA/CA)

## (undated) DEVICE — ELECTRODE 850 FOAM ADHESIVE - HYDROGEL RADIOTRNSPRNT (50/PK)

## (undated) DEVICE — KIT  I.V. START (100EA/CA)

## (undated) DEVICE — SODIUM CHL IRRIGATION 0.9% 1000ML (12EA/CA)

## (undated) DEVICE — TRAY SPINAL ANESTHESIA NON-SAFETY (20/CA)

## (undated) DEVICE — CANISTER SUCTION 3000ML MECHANICAL FILTER AUTO SHUTOFF MEDI-VAC NONSTERILE LF DISP  (40EA/CA)

## (undated) DEVICE — SET LEADWIRE 5 LEAD BEDSIDE DISPOSABLE ECG (1SET OF 5/EA)

## (undated) DEVICE — CANISTER SUCTION 3000ML MECHANICAL FILTER AUTO SHUTOFF MEDI-VAC NONSTERILE LF DISP (40EA/CA)

## (undated) DEVICE — CLOSURE PRINEO SKIN - (2EA/BX)

## (undated) DEVICE — SLEEVE VASO CALF MED - (10PR/CA)

## (undated) DEVICE — WATER IRRIGATION STERILE 1000ML (12EA/CA)

## (undated) DEVICE — HEAD HOLDER JUNIOR/ADULT

## (undated) DEVICE — Device

## (undated) DEVICE — TUBING D & E COLLECTION SET (50EA/PK)

## (undated) DEVICE — TUBING CLEARLINK DUO-VENT - C-FLO (48EA/CA)

## (undated) DEVICE — TUBE CONNECTING SUCTION - CLEAR PLASTIC STERILE 72 IN (50EA/CA)

## (undated) DEVICE — GLOVE BIOGEL SZ 6.5 SURGICAL PF LTX (50PR/BX 4BX/CA)

## (undated) DEVICE — STAPLER SKIN DISP - (6/BX 10BX/CA) VISISTAT

## (undated) DEVICE — CANISTER SUCTION RIGID RED 1500CC (40EA/CA)

## (undated) DEVICE — DETERGENT RENUZYME PLUS 10 OZ PACKET (50/BX)

## (undated) DEVICE — MASK ANESTHESIA ADULT  - (100/CA)

## (undated) DEVICE — KIT D & C COLLECTION (10EA/PK)

## (undated) DEVICE — KIT ANESTHESIA W/CIRCUIT & 3/LT BAG W/FILTER (20EA/CA)

## (undated) DEVICE — TAPE CLOTH MEDIPORE 6 INCH - (12RL/CA)

## (undated) DEVICE — BAG SPONGE COUNT 10.25 X 32 - BLUE (250/CA)

## (undated) DEVICE — MAT PATIENT POSITIONING PREVALON (10EA/CA)

## (undated) DEVICE — SUTURE 1 CHROMIC GUTCT-1 27 (36PK/BX)"

## (undated) DEVICE — CATHETER IV 20 GA X 1-1/4 ---SURG.& SDS ONLY--- (50EA/BX)

## (undated) DEVICE — VACURETTE 9MM CURVED 10/PKG

## (undated) DEVICE — SPONGE GAUZESTER 4 X 4 4PLY - (128PK/CA)

## (undated) DEVICE — PAD LAP STERILE 18 X 18 - (5/PK 40PK/CA)

## (undated) DEVICE — STAPLER SKIN INSORB (6EA/BX)

## (undated) DEVICE — PROTECTOR ULNA NERVE - (36PR/CA)

## (undated) DEVICE — SOLUTION PLASMA-LYTE PH 7.4 INJ 1000ML (14EA/CA)

## (undated) DEVICE — GOWN WARMING STANDARD FLEX - (30/CA)

## (undated) DEVICE — SENSOR SPO2 NEO LNCS ADHESIVE (20/BX) SEE USER NOTES

## (undated) DEVICE — TELFA 8 X 10 BIOSEAL - (50EA/CA)

## (undated) DEVICE — DRESSING TRANSPARENT FILM TEGADERM 4 X 4.75" (50EA/BX)"

## (undated) DEVICE — CATHETER IV NON-SAFETY 18 GA X 1 1/4 (50/BX 4BX/CA)

## (undated) DEVICE — PACK C-SECTION (2EA/CA)

## (undated) DEVICE — TRAY SPINAL ANESTHESIA NON-SAFETY (10/CA)

## (undated) DEVICE — WATER IRRIG. STER. 1500 ML - (9/CA)

## (undated) DEVICE — KIT SKIN NOSE AND MOUTH PRE-OP (20/CA)

## (undated) DEVICE — SUTURE 3-0 VICRYL PLUS CT-1 - 36 INCH (36/BX)

## (undated) DEVICE — GLOVE, BIOGEL ECLIPSE, SZ 7.0, PF LTX (50/BX)